# Patient Record
Sex: FEMALE | Race: WHITE | NOT HISPANIC OR LATINO | Employment: OTHER | URBAN - METROPOLITAN AREA
[De-identification: names, ages, dates, MRNs, and addresses within clinical notes are randomized per-mention and may not be internally consistent; named-entity substitution may affect disease eponyms.]

---

## 2019-12-09 ENCOUNTER — OFFICE VISIT (OUTPATIENT)
Dept: FAMILY MEDICINE | Facility: CLINIC | Age: 84
End: 2019-12-09
Payer: MEDICARE

## 2019-12-09 ENCOUNTER — HOSPITAL ENCOUNTER (OUTPATIENT)
Dept: RADIOLOGY | Facility: HOSPITAL | Age: 84
Discharge: HOME | End: 2019-12-09
Attending: INTERNAL MEDICINE
Payer: MEDICARE

## 2019-12-09 VITALS
DIASTOLIC BLOOD PRESSURE: 88 MMHG | BODY MASS INDEX: 30.64 KG/M2 | SYSTOLIC BLOOD PRESSURE: 138 MMHG | WEIGHT: 152 LBS | OXYGEN SATURATION: 98 % | HEART RATE: 71 BPM | TEMPERATURE: 98 F | HEIGHT: 59 IN | RESPIRATION RATE: 16 BRPM

## 2019-12-09 DIAGNOSIS — G57.93 NEUROPATHY INVOLVING BOTH LOWER EXTREMITIES: ICD-10-CM

## 2019-12-09 DIAGNOSIS — I11.0 BENIGN HYPERTENSIVE HEART DISEASE WITH HEART FAILURE: Chronic | ICD-10-CM

## 2019-12-09 DIAGNOSIS — M25.471 PAIN AND SWELLING OF RIGHT ANKLE: Primary | ICD-10-CM

## 2019-12-09 DIAGNOSIS — M85.89 OSTEOPENIA OF MULTIPLE SITES: Chronic | ICD-10-CM

## 2019-12-09 DIAGNOSIS — I36.1 NONRHEUMATIC TRICUSPID VALVE REGURGITATION: ICD-10-CM

## 2019-12-09 DIAGNOSIS — M81.0 AGE-RELATED OSTEOPOROSIS WITHOUT CURRENT PATHOLOGICAL FRACTURE: ICD-10-CM

## 2019-12-09 DIAGNOSIS — I11.0 BENIGN HYPERTENSIVE HEART DISEASE WITH HEART FAILURE: ICD-10-CM

## 2019-12-09 DIAGNOSIS — E78.2 MIXED HYPERLIPIDEMIA: Chronic | ICD-10-CM

## 2019-12-09 DIAGNOSIS — G47.33 OBSTRUCTIVE SLEEP APNEA SYNDROME: ICD-10-CM

## 2019-12-09 DIAGNOSIS — N18.30 STAGE 3 CHRONIC KIDNEY DISEASE: ICD-10-CM

## 2019-12-09 DIAGNOSIS — G89.29 HEEL PAIN, CHRONIC, RIGHT: ICD-10-CM

## 2019-12-09 DIAGNOSIS — N18.4 STAGE 4 CHRONIC KIDNEY DISEASE: ICD-10-CM

## 2019-12-09 DIAGNOSIS — K21.9 GASTROESOPHAGEAL REFLUX DISEASE WITHOUT ESOPHAGITIS: ICD-10-CM

## 2019-12-09 DIAGNOSIS — E66.09 CLASS 1 OBESITY DUE TO EXCESS CALORIES WITH SERIOUS COMORBIDITY AND BODY MASS INDEX (BMI) OF 30.0 TO 30.9 IN ADULT: ICD-10-CM

## 2019-12-09 DIAGNOSIS — M79.671 HEEL PAIN, CHRONIC, RIGHT: ICD-10-CM

## 2019-12-09 DIAGNOSIS — E78.2 MIXED HYPERLIPIDEMIA: ICD-10-CM

## 2019-12-09 DIAGNOSIS — D53.9 MACROCYTIC ANEMIA: ICD-10-CM

## 2019-12-09 DIAGNOSIS — I48.20 CHRONIC ATRIAL FIBRILLATION: Chronic | ICD-10-CM

## 2019-12-09 DIAGNOSIS — I50.32 CHRONIC DIASTOLIC HEART FAILURE: ICD-10-CM

## 2019-12-09 DIAGNOSIS — M25.471 PAIN AND SWELLING OF RIGHT ANKLE: ICD-10-CM

## 2019-12-09 DIAGNOSIS — I48.20 CHRONIC ATRIAL FIBRILLATION: ICD-10-CM

## 2019-12-09 DIAGNOSIS — Z79.899 ENCOUNTER FOR LONG-TERM CURRENT USE OF MEDICATION: ICD-10-CM

## 2019-12-09 DIAGNOSIS — Z79.01 LONG TERM CURRENT USE OF ANTICOAGULANT THERAPY: ICD-10-CM

## 2019-12-09 DIAGNOSIS — I36.1 NONRHEUMATIC TRICUSPID VALVE REGURGITATION: Chronic | ICD-10-CM

## 2019-12-09 DIAGNOSIS — R60.0 LOCALIZED EDEMA: ICD-10-CM

## 2019-12-09 DIAGNOSIS — R73.03 PREDIABETES: ICD-10-CM

## 2019-12-09 DIAGNOSIS — M25.571 PAIN AND SWELLING OF RIGHT ANKLE: ICD-10-CM

## 2019-12-09 DIAGNOSIS — K21.9 GASTROESOPHAGEAL REFLUX DISEASE WITHOUT ESOPHAGITIS: Chronic | ICD-10-CM

## 2019-12-09 DIAGNOSIS — M25.571 PAIN AND SWELLING OF RIGHT ANKLE: Primary | ICD-10-CM

## 2019-12-09 DIAGNOSIS — I50.32 CHRONIC DIASTOLIC HEART FAILURE: Chronic | ICD-10-CM

## 2019-12-09 DIAGNOSIS — Z79.899 LONG TERM CURRENT USE OF DIURETIC: ICD-10-CM

## 2019-12-09 PROBLEM — I11.9 BENIGN HYPERTENSIVE HEART DISEASE: Status: ACTIVE | Noted: 2019-12-09

## 2019-12-09 PROBLEM — I07.1 TRICUSPID VALVE REGURGITATION: Chronic | Status: ACTIVE | Noted: 2018-09-19

## 2019-12-09 PROBLEM — E66.811 CLASS 1 OBESITY DUE TO EXCESS CALORIES WITH SERIOUS COMORBIDITY AND BODY MASS INDEX (BMI) OF 30.0 TO 30.9 IN ADULT: Status: ACTIVE | Noted: 2019-12-09

## 2019-12-09 PROBLEM — E55.9 VITAMIN D INSUFFICIENCY: Status: ACTIVE | Noted: 2019-12-09

## 2019-12-09 PROBLEM — I07.1 TRICUSPID VALVE REGURGITATION: Status: ACTIVE | Noted: 2018-09-19

## 2019-12-09 PROBLEM — N18.31 CKD STAGE G3A/A1, GFR 45-59 AND ALBUMIN CREATININE RATIO <30 MG/G: Chronic | Status: ACTIVE | Noted: 2019-12-09

## 2019-12-09 PROBLEM — I13.0 BENIGN HYPERTENSIVE HEART AND KIDNEY DISEASE WITH CHF, NYHA CLASS 1 AND CKD STAGE 3: Status: ACTIVE | Noted: 2019-12-09

## 2019-12-09 PROBLEM — I13.0 BENIGN HYPERTENSIVE HEART AND KIDNEY DISEASE WITH CHF, NYHA CLASS 1 AND CKD STAGE 3: Chronic | Status: ACTIVE | Noted: 2019-12-09

## 2019-12-09 PROBLEM — J44.9 CHRONIC OBSTRUCTIVE LUNG DISEASE: Status: ACTIVE | Noted: 2018-09-19

## 2019-12-09 PROBLEM — I11.9 BENIGN HYPERTENSIVE HEART DISEASE: Chronic | Status: ACTIVE | Noted: 2019-12-09

## 2019-12-09 PROCEDURE — 73630 X-RAY EXAM OF FOOT: CPT | Mod: TC,PO,RT

## 2019-12-09 PROCEDURE — 99214 OFFICE O/P EST MOD 30 MIN: CPT | Mod: S$GLB,,, | Performed by: INTERNAL MEDICINE

## 2019-12-09 PROCEDURE — 73630 X-RAY EXAM OF FOOT: CPT | Mod: 26,RT,, | Performed by: RADIOLOGY

## 2019-12-09 PROCEDURE — 73610 XR ANKLE COMPLETE 3 VIEW RIGHT: ICD-10-PCS | Mod: 26,RT,, | Performed by: RADIOLOGY

## 2019-12-09 PROCEDURE — 73610 X-RAY EXAM OF ANKLE: CPT | Mod: TC,PO,RT

## 2019-12-09 PROCEDURE — 73630 XR FOOT COMPLETE 3 VIEW RIGHT: ICD-10-PCS | Mod: 26,RT,, | Performed by: RADIOLOGY

## 2019-12-09 PROCEDURE — 99999 PR PBB SHADOW E&M-EST. PATIENT-LVL V: ICD-10-PCS | Mod: PBBFAC,,, | Performed by: INTERNAL MEDICINE

## 2019-12-09 PROCEDURE — 99999 PR PBB SHADOW E&M-EST. PATIENT-LVL V: CPT | Mod: PBBFAC,,, | Performed by: INTERNAL MEDICINE

## 2019-12-09 PROCEDURE — 73610 X-RAY EXAM OF ANKLE: CPT | Mod: 26,RT,, | Performed by: RADIOLOGY

## 2019-12-09 PROCEDURE — 99214 PR OFFICE/OUTPT VISIT, EST, LEVL IV, 30-39 MIN: ICD-10-PCS | Mod: S$GLB,,, | Performed by: INTERNAL MEDICINE

## 2019-12-09 RX ORDER — PANTOPRAZOLE SODIUM 40 MG/1
40 TABLET, DELAYED RELEASE ORAL DAILY
Refills: 3 | COMMUNITY
Start: 2019-10-08 | End: 2020-03-24

## 2019-12-09 RX ORDER — NYSTATIN 100000 [USP'U]/G
1 POWDER TOPICAL 4 TIMES DAILY
COMMUNITY
Start: 2019-07-08 | End: 2021-05-11 | Stop reason: SDUPTHER

## 2019-12-09 RX ORDER — FERROUS SULFATE 325(65) MG
325 TABLET ORAL DAILY
COMMUNITY
End: 2021-05-16 | Stop reason: SDUPTHER

## 2019-12-09 RX ORDER — FUROSEMIDE 20 MG/1
20 TABLET ORAL DAILY PRN
COMMUNITY
End: 2019-12-09 | Stop reason: SDUPTHER

## 2019-12-09 RX ORDER — CARVEDILOL 12.5 MG/1
12.5 TABLET ORAL DAILY
Qty: 90 TABLET | Refills: 1 | Status: SHIPPED | OUTPATIENT
Start: 2019-12-09 | End: 2020-05-08 | Stop reason: SDUPTHER

## 2019-12-09 RX ORDER — NAPROXEN SODIUM 220 MG/1
81 TABLET, FILM COATED ORAL DAILY
COMMUNITY
End: 2020-07-28

## 2019-12-09 RX ORDER — FUROSEMIDE 20 MG/1
20 TABLET ORAL DAILY PRN
Qty: 90 TABLET | Refills: 1 | Status: SHIPPED | OUTPATIENT
Start: 2019-12-09 | End: 2020-06-18

## 2019-12-09 RX ORDER — CYCLOBENZAPRINE HCL 10 MG
10 TABLET ORAL
Refills: 1 | COMMUNITY
Start: 2019-12-02 | End: 2022-04-12 | Stop reason: SINTOL

## 2019-12-09 RX ORDER — ALENDRONATE SODIUM 70 MG/1
70 TABLET ORAL WEEKLY
Qty: 12 TABLET | Refills: 1 | Status: SHIPPED | OUTPATIENT
Start: 2019-12-09 | End: 2020-06-10

## 2019-12-09 RX ORDER — FLUTICASONE PROPIONATE 110 UG/1
1 AEROSOL, METERED RESPIRATORY (INHALATION) 2 TIMES DAILY
COMMUNITY
Start: 2019-01-02 | End: 2019-12-09

## 2019-12-09 RX ORDER — CARVEDILOL 12.5 MG/1
12.5 TABLET ORAL DAILY
COMMUNITY
Start: 2018-08-13 | End: 2019-12-09 | Stop reason: SDUPTHER

## 2019-12-09 RX ORDER — HYDROCODONE BITARTRATE AND ACETAMINOPHEN 7.5; 325 MG/1; MG/1
1 TABLET ORAL EVERY 8 HOURS PRN
Refills: 0 | COMMUNITY
Start: 2019-12-02 | End: 2020-06-18

## 2019-12-09 RX ORDER — FERROUS SULFATE, DRIED 160(50) MG
1 TABLET, EXTENDED RELEASE ORAL 2 TIMES DAILY
COMMUNITY
End: 2020-06-22

## 2019-12-09 RX ORDER — GABAPENTIN 100 MG/1
100 CAPSULE ORAL 2 TIMES DAILY
Refills: 1 | COMMUNITY
Start: 2019-12-02

## 2019-12-09 RX ORDER — ALENDRONATE SODIUM 70 MG/1
70 TABLET ORAL WEEKLY
COMMUNITY
Start: 2019-10-08 | End: 2019-12-09 | Stop reason: SDUPTHER

## 2019-12-09 RX ORDER — PRAVASTATIN SODIUM 40 MG/1
40 TABLET ORAL NIGHTLY
Refills: 3 | COMMUNITY
Start: 2019-10-08 | End: 2020-05-08 | Stop reason: SDUPTHER

## 2019-12-09 RX ORDER — DABIGATRAN ETEXILATE 150 MG/1
150 CAPSULE ORAL 2 TIMES DAILY
COMMUNITY
End: 2020-01-30 | Stop reason: SDUPTHER

## 2019-12-09 NOTE — PROGRESS NOTES
Subjective:      Patient ID: Miriam Oro is a 86 y.o. female.    Chief Complaint: Foot Swelling (right foot, and heel pain )    HPI     Ms. Marcos is a 87F here for follow up of chronic health conditions. Last seen by me at [facility] on 7/8/19.     Following with Dr. Atwood for HFpEF. Edema stable. Lasix is daily PRN. Has some right pedal edema, which is longstanding. No h/o trauma or fall or fracture. She has not been compliant with elevation or compression stockings. No pedal edema on left side. No SOB, orthopnea, or PND. No change in swelling with lasix use. No new medications. Discussed US and xray.     Following with Dr. Grant for CKD 3. Labs done in 9/2019.      Has been less active. Occasionally walks around the house and outside. she has gained a few pounds with dietary indiscretion. Discussed healthy diet.     Doing well with fosamax. Still taking weekly. No recent falls.     All medications unchanged with refills.     [facility], but not in links. We will contact them.     Reports seeing pain management, but last fill per  from 7/2018.    Depression Patient Health Questionnaire 12/9/2019   Over the last two weeks how often have you been bothered by little interest or pleasure in doing things 0   Over the last two weeks how often have you been bothered by feeling down, depressed or hopeless 0   PHQ-2 Total Score 0       Review of patient's allergies indicates:   Allergen Reactions    Meloxicam Other (See Comments)     Decreases renal function    Nitrofurantoin monohyd/m-cryst Itching       Current Outpatient Medications:     alendronate (FOSAMAX) 70 MG tablet, Take 1 tablet (70 mg total) by mouth once a week., Disp: 12 tablet, Rfl: 1    aspirin 81 MG Chew, Take 81 mg by mouth once daily., Disp: , Rfl:     calcium-vitamin D3 (OYSTER SHELL CALCIUM-VIT D3) 500 mg(1,250mg) -200 unit per tablet, Take 1 tablet by mouth 2 (two) times daily., Disp: , Rfl:     carvedilol (COREG)  12.5 MG tablet, Take 1 tablet (12.5 mg total) by mouth once daily., Disp: 90 tablet, Rfl: 1    cyclobenzaprine (FLEXERIL) 10 MG tablet, Take 10 mg by mouth nightly. at bedtime., Disp: , Rfl: 1    dabigatran etexilate (PRADAXA) 150 mg Cap, Take 150 mg by mouth 2 (two) times daily., Disp: , Rfl:     ferrous sulfate (FEOSOL) 325 mg (65 mg iron) Tab tablet, Take 325 mg by mouth once daily., Disp: , Rfl:     furosemide (LASIX) 20 MG tablet, Take 1 tablet (20 mg total) by mouth daily as needed., Disp: 90 tablet, Rfl: 1    gabapentin (NEURONTIN) 100 MG capsule, Take 200 mg by mouth 3 (three) times daily. , Disp: , Rfl: 1    HYDROcodone-acetaminophen (NORCO) 7.5-325 mg per tablet, Take 1 tablet by mouth every 8 (eight) hours as needed., Disp: , Rfl: 0    nystatin (MYCOSTATIN) powder, Apply 1 application topically 4 (four) times daily., Disp: , Rfl:     pantoprazole (PROTONIX) 40 MG tablet, Take 40 mg by mouth once daily., Disp: , Rfl: 3    pravastatin (PRAVACHOL) 40 MG tablet, Take 40 mg by mouth nightly. at bedtime., Disp: , Rfl: 3    Past Medical History:   Diagnosis Date    Age-related osteoporosis without current pathological fracture 8/13/2018    Chronic atrial fibrillation 8/13/2018    Chronic diastolic heart failure 8/13/2018    Mixed hyperlipidemia 8/13/2018    Osteopenia of multiple sites 12/9/2019    RA (rheumatoid arthritis)     Tricuspid valve regurgitation 9/19/2018     Past Surgical History:   Procedure Laterality Date    TOTAL ABDOMINAL HYSTERECTOMY W/ BILATERAL SALPINGOOPHORECTOMY       Family History   Problem Relation Age of Onset    Aneurysm Mother      Social History     Socioeconomic History    Marital status:      Spouse name: Not on file    Number of children: Not on file    Years of education: Not on file    Highest education level: Not on file   Occupational History    Not on file   Social Needs    Financial resource strain: Not on file    Food insecurity:     Worry:  Not on file     Inability: Not on file    Transportation needs:     Medical: Not on file     Non-medical: Not on file   Tobacco Use    Smoking status: Never Smoker    Smokeless tobacco: Never Used   Substance and Sexual Activity    Alcohol use: Not Currently    Drug use: Never    Sexual activity: Not Currently     Partners: Male     Birth control/protection: Surgical   Lifestyle    Physical activity:     Days per week: Not on file     Minutes per session: Not on file    Stress: Not on file   Relationships    Social connections:     Talks on phone: Not on file     Gets together: Not on file     Attends Restorationism service: Not on file     Active member of club or organization: Not on file     Attends meetings of clubs or organizations: Not on file     Relationship status: Not on file   Other Topics Concern    Not on file   Social History Narrative    Not on file      Review of Systems   Constitutional: Positive for activity change and fatigue. Negative for chills and fever.   HENT: Positive for ear pain, postnasal drip, rhinorrhea, sinus pressure, sinus pain, sneezing and sore throat. Negative for congestion.    Eyes: Positive for itching. Negative for visual disturbance.   Respiratory: Negative for cough, shortness of breath and wheezing.    Cardiovascular: Negative.  Negative for chest pain and palpitations.   Gastrointestinal: Negative for abdominal pain and nausea.   Endocrine: Negative for cold intolerance and heat intolerance.   Genitourinary: Negative.  Negative for dysuria.   Musculoskeletal: Negative for back pain and myalgias.   Skin: Negative.  Negative for rash.   Allergic/Immunologic: Negative for environmental allergies.   Neurological: Negative for dizziness, syncope and headaches.   Hematological: Positive for adenopathy. Does not bruise/bleed easily.   Psychiatric/Behavioral: Negative for dysphoric mood and sleep disturbance.         Objective:     Body mass index is 30.7 kg/m².  /88  "(BP Location: Left arm, Patient Position: Sitting, BP Method: Medium (Automatic))   Pulse 71   Temp 98.1 °F (36.7 °C)   Resp 16   Ht 4' 11" (1.499 m)   Wt 68.9 kg (152 lb)   SpO2 98%   BMI 30.70 kg/m²       Physical Exam   Constitutional: She is oriented to person, place, and time. She appears well-developed and well-nourished. No distress.   Here with granddaughter     HENT:   Head: Normocephalic.   Mouth/Throat: Oropharynx is clear and moist.   Eyes: Conjunctivae are normal.   Neck: Normal range of motion. Neck supple.   Cardiovascular: Normal rate, normal heart sounds and intact distal pulses.   Pulses:       Dorsalis pedis pulses are 2+ on the right side, and 2+ on the left side.        Posterior tibial pulses are 2+ on the right side, and 2+ on the left side.   Irregularly irregular   Pulmonary/Chest: Effort normal and breath sounds normal. She has no wheezes.   Abdominal: Soft. Bowel sounds are normal.   Musculoskeletal: She exhibits edema (right pedal edema).        Right foot: There is tenderness. There is normal range of motion, no bony tenderness, no swelling, normal capillary refill and no deformity.        Left foot: There is normal range of motion and no deformity.        Feet:    Feet:   Right Foot:   Protective Sensation: 10 sites tested. 10 sites sensed.   Skin Integrity: Negative for ulcer, blister, skin breakdown, erythema, warmth, callus or dry skin.   Left Foot:   Protective Sensation: 10 sites tested. 10 sites sensed.   Skin Integrity: Negative for ulcer, blister, skin breakdown, erythema, warmth, callus or dry skin.   Lymphadenopathy:     She has no cervical adenopathy.   Neurological: She is alert and oriented to person, place, and time. No sensory deficit.   Skin: Skin is warm and dry. Capillary refill takes 2 to 3 seconds. She is not diaphoretic.   Psychiatric: She has a normal mood and affect. Her behavior is normal.   Nursing note and vitals reviewed.      Lab Visit on 12/09/2019 "   Component Date Value Ref Range Status    Hemoglobin A1C 12/09/2019 5.7* 4.0 - 5.6 % Final    Comment: ADA Screening Guidelines:  5.7-6.4%  Consistent with prediabetes  >or=6.5%  Consistent with diabetes  High levels of fetal hemoglobin interfere with the HbA1C  assay. Heterozygous hemoglobin variants (HbS, HgC, etc)do  not significantly interfere with this assay.   However, presence of multiple variants may affect accuracy.      Estimated Avg Glucose 12/09/2019 117  68 - 131 mg/dL Final     No results found in the last 24 hours.   Assessment:     Encounter Diagnoses   Name Primary?    Pain and swelling of right ankle Yes    Neuropathy involving both lower extremities     Chronic atrial fibrillation     Benign hypertensive heart disease with heart failure     Chronic diastolic heart failure     Mixed hyperlipidemia     Nonrheumatic tricuspid valve regurgitation     Stage 3 chronic kidney disease     Gastroesophageal reflux disease without esophagitis     Prediabetes     Encounter for long-term current use of medication     Macrocytic anemia     Localized edema      Osteopenia of multiple sites     Class 1 obesity due to excess calories with serious comorbidity and body mass index (BMI) of 30.0 to 30.9 in adult     Heel pain, chronic, right         Plan:     #Right pedal edema with heel pain  -longstanding. No h/o fracture. Pain at achilles tendon insertion site.   -compression stockings with elevation  -xray and us    #Chronic Afib on NOAC, controlled  -Continue Coreg 12.5 mg daily & Pradaxa BID + ASA 81 mg daily   -Rate controlled  -Follows with Dr. Atwood  -8/31/18: TSH 2.68. Repeat      #Osteopenia  -Previously osteoporosis in 1/2013.    -Dexa 8/29/18- now osteopenia, however, considering h/o compression fracture opted to continue bisphosphonate therapy with fosamax  -Repeat dexa in 2021  -Continue Calcium and Vitamin D  -10/30/18: vit d 38.8 (insufficient in 2017-  25.7)     #HFpEF  Euvolemic.  -Follows with Dr. Atwood   -Lasix 20 mg BID PRN     #CKD stage 3  -9/26/19: Cr 1.08, GFR 48, Urine protein/cr ratio 0.09, Uric acid 5.9 iPTH 66, phos 3.6, mg 2.1   -Follows with Dr. Grant     #GERD  -Continue Protonix daily (9/26/19: Mg 2.1)     #HLD  -Pravastatin 40 mg daily  -Lipid panel normal 8/2018  -repeat     #Prediabetes  -10/30/18: ha1c 5.8% --> 9/26/19: ha1c 6.3%  -repeat    #Obesity- BMI 30.7  -She has gained about 5 pounds since last visit. Encouraged to eat healthy with more weight bearing exercises    #Macrocytic anemia with neuropathy of BLE  9/26/19: 12.4/38. .3  -b12, folate, MMA  -Continue Iron daily (previous GI bleed)     #Chronic Pain  -Sees pain management.  reviewed with last fill 7/2018  -Continue Norco and Gabapentin      #Preventative Care  -Influenza UTD - given at [facility]. Not showing in links.  -TDAP 1/1/2005  -Prevnar 4/8/19. Pneumovax 10/30/2003  -Shingrix rx given   -Mammogram 2014. Birads 1. No indication to repeat considering advanced age.   -No indication for colonoscopy considering advanced age.      Randi Oro M.D.    Orders Placed This Encounter   Procedures    US Lower Extremity Veins Right    X-Ray Ankle Complete 3 View Right    X-Ray Foot Complete 3 view Right    CBC auto differential    Comprehensive metabolic panel    Lipid panel    TSH    Hemoglobin A1c    Vitamin D    Magnesium    Vitamin B12    Folate      Medications Ordered This Encounter   Medications    alendronate (FOSAMAX) 70 MG tablet     Sig: Take 1 tablet (70 mg total) by mouth once a week.     Dispense:  12 tablet     Refill:  1    carvedilol (COREG) 12.5 MG tablet     Sig: Take 1 tablet (12.5 mg total) by mouth once daily.     Dispense:  90 tablet     Refill:  1    furosemide (LASIX) 20 MG tablet     Sig: Take 1 tablet (20 mg total) by mouth daily as needed.     Dispense:  90 tablet     Refill:  1

## 2019-12-10 ENCOUNTER — TELEPHONE (OUTPATIENT)
Dept: FAMILY MEDICINE | Facility: CLINIC | Age: 84
End: 2019-12-10

## 2019-12-10 RX ORDER — CALCIUM CARB/VITAMIN D3/VIT K1 500-500-40
400 TABLET,CHEWABLE ORAL DAILY
COMMUNITY
End: 2020-06-22

## 2019-12-10 RX ORDER — LANOLIN ALCOHOL/MO/W.PET/CERES
100 CREAM (GRAM) TOPICAL DAILY
COMMUNITY
End: 2020-06-18

## 2019-12-10 NOTE — TELEPHONE ENCOUNTER
----- Message from Randi Oro MD sent at 12/10/2019  7:05 AM CST -----  Prediabetes has improved (down from 6.3%). Vitamin d insufficiency. Would recommend starting over the counter vitamin d 400 units daily (if in agreement please add to med list). b12 still borderline low. Would start over the counter b12 1,000 mcg daily  (if in agreement please add to med list)    PLEASE CALL HER OR HER GRANDDAUGHTER CATRACHITA IF SHE DOES NOT RESPOND TO THE Spokeable MESSAGE

## 2019-12-12 ENCOUNTER — TELEPHONE (OUTPATIENT)
Dept: RADIOLOGY | Facility: HOSPITAL | Age: 84
End: 2019-12-12

## 2019-12-13 ENCOUNTER — HOSPITAL ENCOUNTER (OUTPATIENT)
Dept: RADIOLOGY | Facility: HOSPITAL | Age: 84
Discharge: HOME | End: 2019-12-13
Attending: INTERNAL MEDICINE
Payer: MEDICARE

## 2019-12-13 DIAGNOSIS — R73.03 PREDIABETES: ICD-10-CM

## 2019-12-13 DIAGNOSIS — M25.471 PAIN AND SWELLING OF RIGHT ANKLE: ICD-10-CM

## 2019-12-13 DIAGNOSIS — M25.571 PAIN AND SWELLING OF RIGHT ANKLE: ICD-10-CM

## 2019-12-13 DIAGNOSIS — I11.0 BENIGN HYPERTENSIVE HEART DISEASE WITH HEART FAILURE: ICD-10-CM

## 2019-12-13 DIAGNOSIS — E78.2 MIXED HYPERLIPIDEMIA: ICD-10-CM

## 2019-12-13 DIAGNOSIS — N18.30 STAGE 3 CHRONIC KIDNEY DISEASE: ICD-10-CM

## 2019-12-13 DIAGNOSIS — I48.20 CHRONIC ATRIAL FIBRILLATION: ICD-10-CM

## 2019-12-13 DIAGNOSIS — R60.0 LOCALIZED EDEMA: ICD-10-CM

## 2019-12-13 DIAGNOSIS — I50.32 CHRONIC DIASTOLIC HEART FAILURE: ICD-10-CM

## 2019-12-13 DIAGNOSIS — G57.93 NEUROPATHY INVOLVING BOTH LOWER EXTREMITIES: ICD-10-CM

## 2019-12-13 DIAGNOSIS — D53.9 MACROCYTIC ANEMIA: ICD-10-CM

## 2019-12-13 DIAGNOSIS — Z79.899 ENCOUNTER FOR LONG-TERM CURRENT USE OF MEDICATION: ICD-10-CM

## 2019-12-13 DIAGNOSIS — K21.9 GASTROESOPHAGEAL REFLUX DISEASE WITHOUT ESOPHAGITIS: ICD-10-CM

## 2019-12-13 DIAGNOSIS — I36.1 NONRHEUMATIC TRICUSPID VALVE REGURGITATION: ICD-10-CM

## 2019-12-13 PROCEDURE — 93971 US LOWER EXTREMITY VEINS RIGHT: ICD-10-PCS | Mod: 26,RT,, | Performed by: RADIOLOGY

## 2019-12-13 PROCEDURE — 93971 EXTREMITY STUDY: CPT | Mod: 26,RT,, | Performed by: RADIOLOGY

## 2019-12-13 PROCEDURE — 93971 EXTREMITY STUDY: CPT | Mod: TC,PO,RT

## 2020-01-10 ENCOUNTER — TELEPHONE (OUTPATIENT)
Dept: FAMILY MEDICINE | Facility: CLINIC | Age: 85
End: 2020-01-10

## 2020-01-10 NOTE — TELEPHONE ENCOUNTER
----- Message from Warren Still sent at 1/10/2020  8:27 AM CST -----  ..Type:  Needs Medical Advice    Who Called: Amaya ( Grandmother )   Symptoms (please be specific):  Congestion  ( cold Like )   How long has patient had these symptoms:   Few days   Pharmacy name and phone #:  ..    [facility] - [address]  [address] [facility]  [place] [address]  Phone: [phone] Fax: [phone]  Would the patient rather a call back or a response via [facility]? Call back  Best Call Back Number: [phone]  Additional Information:

## 2020-01-11 NOTE — TELEPHONE ENCOUNTER
I recommended the use of Allegra because she complained of a drippy nose and no congestion or fever.

## 2020-01-13 DIAGNOSIS — K21.9 GASTROESOPHAGEAL REFLUX DISEASE WITHOUT ESOPHAGITIS: Chronic | ICD-10-CM

## 2020-01-13 DIAGNOSIS — I50.32 CHRONIC DIASTOLIC HEART FAILURE: Chronic | ICD-10-CM

## 2020-01-13 DIAGNOSIS — G57.93 NEUROPATHY INVOLVING BOTH LOWER EXTREMITIES: ICD-10-CM

## 2020-01-13 DIAGNOSIS — N18.30 STAGE 3 CHRONIC KIDNEY DISEASE: ICD-10-CM

## 2020-01-13 DIAGNOSIS — Z79.899 ENCOUNTER FOR LONG-TERM CURRENT USE OF MEDICATION: ICD-10-CM

## 2020-01-13 DIAGNOSIS — R73.03 PREDIABETES: ICD-10-CM

## 2020-01-13 DIAGNOSIS — E78.2 MIXED HYPERLIPIDEMIA: Chronic | ICD-10-CM

## 2020-01-13 DIAGNOSIS — I11.0 BENIGN HYPERTENSIVE HEART DISEASE WITH HEART FAILURE: Chronic | ICD-10-CM

## 2020-01-13 DIAGNOSIS — I36.1 NONRHEUMATIC TRICUSPID VALVE REGURGITATION: Chronic | ICD-10-CM

## 2020-01-13 DIAGNOSIS — D53.9 MACROCYTIC ANEMIA: ICD-10-CM

## 2020-01-13 DIAGNOSIS — I48.20 CHRONIC ATRIAL FIBRILLATION: Chronic | ICD-10-CM

## 2020-01-13 DIAGNOSIS — M25.571 PAIN AND SWELLING OF RIGHT ANKLE: ICD-10-CM

## 2020-01-13 DIAGNOSIS — M25.471 PAIN AND SWELLING OF RIGHT ANKLE: ICD-10-CM

## 2020-01-13 RX ORDER — CARVEDILOL 12.5 MG/1
12.5 TABLET ORAL DAILY
Qty: 90 TABLET | Refills: 1 | Status: CANCELLED | OUTPATIENT
Start: 2020-01-13

## 2020-01-13 NOTE — TELEPHONE ENCOUNTER
Outpatient Medication Detail      Disp Refills Start End HEATHER   carvedilol (COREG) 12.5 MG tablet 90 tablet 1 12/9/2019  No   Sig - Route: Take 1 tablet (12.5 mg total) by mouth once daily. - Oral   Sent to pharmacy as: carvedilol (COREG) 12.5 MG tablet   Class: Normal   Order: [id number]   Date/Time Signed: 12/9/2019 10:01       E-Prescribing Status: Receipt confirmed by pharmacy (12/9/2019 10:08 AM CST)   Pharmacy     [facility] [place] - [address]

## 2020-01-13 NOTE — TELEPHONE ENCOUNTER
----- Message from Saumya Colon sent at 1/13/2020  8:25 AM CST -----  Contact: Mckinleygrand daughter  Type:  RX Refill Request    Who Called: Amaya-grand daughter  Refill or New Rx:refill  RX Name and Strength: carvedilol (COREG) 12.5 MG tablet   How is the patient currently taking it? (ex. 1XDay):1xday  Is this a 30 day or 90 day RX:90  Preferred Pharmacy with phone number:    [facility] - [address]  [address] [facility] [address]  [place] [address]  Phone: [phone] Fax: [phone]    Local or Mail Order:local  Ordering Provider:Preethi  Would the patient rather a call back or a response via [facility]? call  Best Call Back Number:[phone]  Additional Information:

## 2020-01-30 RX ORDER — DABIGATRAN ETEXILATE 150 MG/1
150 CAPSULE ORAL 2 TIMES DAILY
Qty: 60 CAPSULE | Refills: 3 | Status: SHIPPED | OUTPATIENT
Start: 2020-01-30 | End: 2020-06-22

## 2020-01-30 NOTE — TELEPHONE ENCOUNTER
----- Message from Apryl Stern sent at 1/30/2020  9:13 AM CST -----  Contact: granddaughter  Type:  RX Refill Request    Who Called: Amaya  Refill or New Rx:refill  RX Name and Strength:praxdaxa 150 mg  How is the patient currently taking it? (ex. 1XDay):2Xday  Is this a 30 day or 90 day RX:30  Preferred Pharmacy with phone number:.  [facility] - [address] [facility] [address]  [address] [facility]  [place] [address]  Phone: [phone] Fax: [phone]  Local or Mail Order:local  Ordering Provider:Dr Oro  Would the patient rather a call back or a response via [facility]? Call back  Best Call Back Number:[phone]  Additional Information: #    Thank you

## 2020-02-07 RX ORDER — DABIGATRAN ETEXILATE 150 MG/1
150 CAPSULE ORAL 2 TIMES DAILY
Qty: 180 CAPSULE | Refills: 1 | OUTPATIENT
Start: 2020-02-07

## 2020-02-07 NOTE — TELEPHONE ENCOUNTER
dabigatran etexilate (PRADAXA) 150 mg Cap 60 capsule 3 1/30/2020  No   Sig - Route: Take 1 capsule (150 mg total) by mouth 2 (two) times daily. - Oral   Sent to pharmacy as: dabigatran etexilate (PRADAXA) 150 mg Cap   Class: Normal   Order: [id number]   Date/Time Signed: 1/30/2020 10:06       E-Prescribing Status: Receipt confirmed by pharmacy (1/30/2020 10:08 AM CST)     Check why i'm receiving multiple requests for escribed medications

## 2020-02-07 NOTE — TELEPHONE ENCOUNTER
----- Message from Deena Steven sent at 2/7/2020  1:14 PM CST -----  Contact: [phone]  Would like to consult with the nurse, Patient needs an Rx Medication, please call  Back at [phone], Thank sj  .Type:  RX Refill Request    Who Called:  Ms Oro  Refill or New Rx:Refill  RX Name and Strength:Blood Thinner pradaxa 150mg Also Carvedilol 12.5 mg   How is the patient currently taking it? (ex. 1XDay): Twice a day  Is this a 30 day or 90 day RX:90  Preferred Pharmacy with phone number:.  [facility] - [address]  [address] [facility] [address]  [place] [address]  Phone: [phone] Fax: [phone]      Local or Mail Order: Local  Ordering Provider:Dr Oro  Would the patient rather a call back or a response via [facility]? callback  Best Call Back Number:[phone]  Additional Information:

## 2020-03-24 RX ORDER — PANTOPRAZOLE SODIUM 40 MG/1
TABLET, DELAYED RELEASE ORAL
Qty: 90 TABLET | Refills: 0 | Status: SHIPPED | OUTPATIENT
Start: 2020-03-24 | End: 2020-08-04

## 2020-04-02 ENCOUNTER — TELEPHONE (OUTPATIENT)
Dept: FAMILY MEDICINE | Facility: CLINIC | Age: 85
End: 2020-04-02

## 2020-04-02 DIAGNOSIS — I11.0 BENIGN HYPERTENSIVE HEART DISEASE WITH HEART FAILURE: Chronic | ICD-10-CM

## 2020-04-02 DIAGNOSIS — I50.32 CHRONIC DIASTOLIC HEART FAILURE: Chronic | ICD-10-CM

## 2020-04-02 DIAGNOSIS — G57.93 NEUROPATHY INVOLVING BOTH LOWER EXTREMITIES: ICD-10-CM

## 2020-04-02 DIAGNOSIS — I36.1 NONRHEUMATIC TRICUSPID VALVE REGURGITATION: Chronic | ICD-10-CM

## 2020-04-02 DIAGNOSIS — I48.20 CHRONIC ATRIAL FIBRILLATION: Chronic | ICD-10-CM

## 2020-04-02 DIAGNOSIS — R73.03 PREDIABETES: ICD-10-CM

## 2020-04-02 DIAGNOSIS — M25.471 PAIN AND SWELLING OF RIGHT ANKLE: ICD-10-CM

## 2020-04-02 DIAGNOSIS — Z79.899 ENCOUNTER FOR LONG-TERM CURRENT USE OF MEDICATION: ICD-10-CM

## 2020-04-02 DIAGNOSIS — N18.30 STAGE 3 CHRONIC KIDNEY DISEASE: ICD-10-CM

## 2020-04-02 DIAGNOSIS — E78.2 MIXED HYPERLIPIDEMIA: Chronic | ICD-10-CM

## 2020-04-02 DIAGNOSIS — K21.9 GASTROESOPHAGEAL REFLUX DISEASE WITHOUT ESOPHAGITIS: Chronic | ICD-10-CM

## 2020-04-02 DIAGNOSIS — D53.9 MACROCYTIC ANEMIA: ICD-10-CM

## 2020-04-02 DIAGNOSIS — M25.571 PAIN AND SWELLING OF RIGHT ANKLE: ICD-10-CM

## 2020-04-02 NOTE — TELEPHONE ENCOUNTER
granddaughter states patient is needing a PA for medication,PA Sent to plan.    ------------------------------------------------------------------------    Miriam Oro  [place]: [id number]   PA Case ID: [id number]     Status    Sent to Plan today      Drug  Alendronate Sodium 70MG tablets  Form  [employer] Electronic PA Form

## 2020-04-02 NOTE — TELEPHONE ENCOUNTER
----- Message from Nikki Rodriguez sent at 4/2/2020  9:13 AM CDT -----  Contact: Amaya/saeid Conde needs a call back at [phone], Regards to getting an authorization for FOSAMAX 70 mg.    [facility] [place] [facility] - [address] [facility] [address]  [address] [facility]  [place] [address]  Phone: [phone] Fax: [phone]    Thanks  Td

## 2020-05-08 DIAGNOSIS — D53.9 MACROCYTIC ANEMIA: ICD-10-CM

## 2020-05-08 DIAGNOSIS — K21.9 GASTROESOPHAGEAL REFLUX DISEASE WITHOUT ESOPHAGITIS: Chronic | ICD-10-CM

## 2020-05-08 DIAGNOSIS — I48.20 CHRONIC ATRIAL FIBRILLATION: Chronic | ICD-10-CM

## 2020-05-08 DIAGNOSIS — M25.571 PAIN AND SWELLING OF RIGHT ANKLE: ICD-10-CM

## 2020-05-08 DIAGNOSIS — I50.32 CHRONIC DIASTOLIC HEART FAILURE: Chronic | ICD-10-CM

## 2020-05-08 DIAGNOSIS — I11.0 BENIGN HYPERTENSIVE HEART DISEASE WITH HEART FAILURE: Chronic | ICD-10-CM

## 2020-05-08 DIAGNOSIS — M25.471 PAIN AND SWELLING OF RIGHT ANKLE: ICD-10-CM

## 2020-05-08 DIAGNOSIS — I36.1 NONRHEUMATIC TRICUSPID VALVE REGURGITATION: Chronic | ICD-10-CM

## 2020-05-08 DIAGNOSIS — E78.2 MIXED HYPERLIPIDEMIA: Chronic | ICD-10-CM

## 2020-05-08 DIAGNOSIS — N18.30 STAGE 3 CHRONIC KIDNEY DISEASE: ICD-10-CM

## 2020-05-08 DIAGNOSIS — Z79.899 ENCOUNTER FOR LONG-TERM CURRENT USE OF MEDICATION: ICD-10-CM

## 2020-05-08 DIAGNOSIS — G57.93 NEUROPATHY INVOLVING BOTH LOWER EXTREMITIES: ICD-10-CM

## 2020-05-08 DIAGNOSIS — R73.03 PREDIABETES: ICD-10-CM

## 2020-05-08 RX ORDER — PRAVASTATIN SODIUM 40 MG/1
40 TABLET ORAL NIGHTLY
Qty: 90 TABLET | Refills: 0 | Status: SHIPPED | OUTPATIENT
Start: 2020-05-08 | End: 2020-08-05

## 2020-05-08 RX ORDER — CARVEDILOL 12.5 MG/1
12.5 TABLET ORAL DAILY
Qty: 90 TABLET | Refills: 0 | Status: SHIPPED | OUTPATIENT
Start: 2020-05-08 | End: 2020-06-18 | Stop reason: SDUPTHER

## 2020-05-08 NOTE — TELEPHONE ENCOUNTER
----- Message from Rigo Antoine sent at 5/8/2020 10:01 AM CDT -----  Contact: Alin  .Type:  RX Refill Request    Who Called:  Alin   Refill or New Rx: refill  RX Name and Strength carvedilol (COREG) 12.5 MG tablet  pravastatin (PRAVACHOL) 40 MG tablet  How is the patient currently taking it? (ex. 1XDay): 1 x day   Is this a 30 day or 90 day RX: 90 days   Preferred Pharmacy with phone number:[facility]   Local or Mail Order: local   Ordering Provider: rosalina   Would the patient rather a call back or a response via [facility]? No   Best Call Back Number: .[phone]    Additional Information:               carvedilol (COREG) 12.5 MG tablet  pravastatin (PRAVACHOL) 40 MG tablet             [facility] - [address] [facility]  [address] [facility]  [place] [address]  Phone: [phone] Fax: [phone]

## 2020-06-10 DIAGNOSIS — Z79.899 ENCOUNTER FOR LONG-TERM CURRENT USE OF MEDICATION: ICD-10-CM

## 2020-06-10 DIAGNOSIS — I11.0 BENIGN HYPERTENSIVE HEART DISEASE WITH HEART FAILURE: Chronic | ICD-10-CM

## 2020-06-10 DIAGNOSIS — M25.471 PAIN AND SWELLING OF RIGHT ANKLE: ICD-10-CM

## 2020-06-10 DIAGNOSIS — I36.1 NONRHEUMATIC TRICUSPID VALVE REGURGITATION: Chronic | ICD-10-CM

## 2020-06-10 DIAGNOSIS — R73.03 PREDIABETES: ICD-10-CM

## 2020-06-10 DIAGNOSIS — D53.9 MACROCYTIC ANEMIA: ICD-10-CM

## 2020-06-10 DIAGNOSIS — I48.20 CHRONIC ATRIAL FIBRILLATION: Chronic | ICD-10-CM

## 2020-06-10 DIAGNOSIS — M25.571 PAIN AND SWELLING OF RIGHT ANKLE: ICD-10-CM

## 2020-06-10 DIAGNOSIS — N18.30 STAGE 3 CHRONIC KIDNEY DISEASE: ICD-10-CM

## 2020-06-10 DIAGNOSIS — E78.2 MIXED HYPERLIPIDEMIA: Chronic | ICD-10-CM

## 2020-06-10 DIAGNOSIS — K21.9 GASTROESOPHAGEAL REFLUX DISEASE WITHOUT ESOPHAGITIS: Chronic | ICD-10-CM

## 2020-06-10 DIAGNOSIS — G57.93 NEUROPATHY INVOLVING BOTH LOWER EXTREMITIES: ICD-10-CM

## 2020-06-10 DIAGNOSIS — I50.32 CHRONIC DIASTOLIC HEART FAILURE: Chronic | ICD-10-CM

## 2020-06-10 PROBLEM — K92.2 GIB (GASTROINTESTINAL BLEEDING): Status: ACTIVE | Noted: 2020-05-30

## 2020-06-10 RX ORDER — DEXAMETHASONE 4 MG/1
1 TABLET ORAL EVERY 12 HOURS
COMMUNITY
Start: 2020-06-02 | End: 2020-11-02

## 2020-06-10 RX ORDER — FOLIC ACID 1 MG/1
1000 TABLET ORAL DAILY
COMMUNITY
Start: 2020-06-02 | End: 2021-05-16 | Stop reason: SDUPTHER

## 2020-06-10 RX ORDER — ASPIRIN 325 MG/1
100 TABLET, FILM COATED ORAL DAILY
COMMUNITY
Start: 2020-06-02 | End: 2021-05-11

## 2020-06-10 RX ORDER — ALENDRONATE SODIUM 70 MG/1
TABLET ORAL
Qty: 12 TABLET | Refills: 0 | Status: SHIPPED | OUTPATIENT
Start: 2020-06-10 | End: 2020-10-29

## 2020-06-18 ENCOUNTER — HOSPITAL ENCOUNTER (OUTPATIENT)
Dept: RADIOLOGY | Facility: HOSPITAL | Age: 85
Discharge: HOME | End: 2020-06-18
Attending: INTERNAL MEDICINE
Payer: MEDICARE

## 2020-06-18 ENCOUNTER — OFFICE VISIT (OUTPATIENT)
Dept: FAMILY MEDICINE | Facility: CLINIC | Age: 85
End: 2020-06-18
Payer: MEDICARE

## 2020-06-18 VITALS
TEMPERATURE: 98 F | WEIGHT: 149 LBS | DIASTOLIC BLOOD PRESSURE: 91 MMHG | HEART RATE: 103 BPM | RESPIRATION RATE: 18 BRPM | HEIGHT: 59 IN | OXYGEN SATURATION: 97 % | BODY MASS INDEX: 30.04 KG/M2 | SYSTOLIC BLOOD PRESSURE: 151 MMHG

## 2020-06-18 DIAGNOSIS — I50.32 CHRONIC DIASTOLIC HEART FAILURE: ICD-10-CM

## 2020-06-18 DIAGNOSIS — I36.1 NONRHEUMATIC TRICUSPID VALVE REGURGITATION: Chronic | ICD-10-CM

## 2020-06-18 DIAGNOSIS — Z79.899 ENCOUNTER FOR LONG-TERM CURRENT USE OF MEDICATION: ICD-10-CM

## 2020-06-18 DIAGNOSIS — M25.471 PAIN AND SWELLING OF RIGHT ANKLE: ICD-10-CM

## 2020-06-18 DIAGNOSIS — N18.30 STAGE 3 CHRONIC KIDNEY DISEASE: ICD-10-CM

## 2020-06-18 DIAGNOSIS — E55.9 VITAMIN D INSUFFICIENCY: ICD-10-CM

## 2020-06-18 DIAGNOSIS — Z79.899 LONG TERM CURRENT USE OF DIURETIC: ICD-10-CM

## 2020-06-18 DIAGNOSIS — I36.1 NONRHEUMATIC TRICUSPID VALVE REGURGITATION: ICD-10-CM

## 2020-06-18 DIAGNOSIS — E78.2 MIXED HYPERLIPIDEMIA: ICD-10-CM

## 2020-06-18 DIAGNOSIS — G57.93 NEUROPATHY INVOLVING BOTH LOWER EXTREMITIES: ICD-10-CM

## 2020-06-18 DIAGNOSIS — M25.571 PAIN AND SWELLING OF RIGHT ANKLE: ICD-10-CM

## 2020-06-18 DIAGNOSIS — F41.9 ANXIETY: ICD-10-CM

## 2020-06-18 DIAGNOSIS — I11.0 BENIGN HYPERTENSIVE HEART DISEASE WITH HEART FAILURE: ICD-10-CM

## 2020-06-18 DIAGNOSIS — M85.89 OSTEOPENIA OF MULTIPLE SITES: ICD-10-CM

## 2020-06-18 DIAGNOSIS — N18.31 CKD STAGE G3A/A1, GFR 45-59 AND ALBUMIN CREATININE RATIO <30 MG/G: ICD-10-CM

## 2020-06-18 DIAGNOSIS — R73.03 PREDIABETES: ICD-10-CM

## 2020-06-18 DIAGNOSIS — M85.89 OSTEOPENIA OF MULTIPLE SITES: Chronic | ICD-10-CM

## 2020-06-18 DIAGNOSIS — K92.2 GASTROINTESTINAL HEMORRHAGE, UNSPECIFIED GASTROINTESTINAL HEMORRHAGE TYPE: ICD-10-CM

## 2020-06-18 DIAGNOSIS — K21.9 GASTROESOPHAGEAL REFLUX DISEASE WITHOUT ESOPHAGITIS: ICD-10-CM

## 2020-06-18 DIAGNOSIS — E78.2 MIXED HYPERLIPIDEMIA: Chronic | ICD-10-CM

## 2020-06-18 DIAGNOSIS — Z79.01 LONG TERM CURRENT USE OF ANTICOAGULANT THERAPY: Chronic | ICD-10-CM

## 2020-06-18 DIAGNOSIS — I13.0 BENIGN HYPERTENSIVE HEART AND KIDNEY DISEASE WITH CHF, NYHA CLASS 1 AND CKD STAGE 3: ICD-10-CM

## 2020-06-18 DIAGNOSIS — I13.0 BENIGN HYPERTENSIVE HEART AND KIDNEY DISEASE WITH CHF, NYHA CLASS 1 AND CKD STAGE 3: Chronic | ICD-10-CM

## 2020-06-18 DIAGNOSIS — K21.9 GASTROESOPHAGEAL REFLUX DISEASE WITHOUT ESOPHAGITIS: Chronic | ICD-10-CM

## 2020-06-18 DIAGNOSIS — I48.20 CHRONIC ATRIAL FIBRILLATION: ICD-10-CM

## 2020-06-18 DIAGNOSIS — N18.30 BENIGN HYPERTENSIVE HEART AND KIDNEY DISEASE WITH CHF, NYHA CLASS 1 AND CKD STAGE 3: Chronic | ICD-10-CM

## 2020-06-18 DIAGNOSIS — N18.30 BENIGN HYPERTENSIVE HEART AND KIDNEY DISEASE WITH CHF, NYHA CLASS 1 AND CKD STAGE 3: ICD-10-CM

## 2020-06-18 DIAGNOSIS — N18.31 CKD STAGE G3A/A1, GFR 45-59 AND ALBUMIN CREATININE RATIO <30 MG/G: Chronic | ICD-10-CM

## 2020-06-18 DIAGNOSIS — Z79.899 LONG TERM CURRENT USE OF DIURETIC: Chronic | ICD-10-CM

## 2020-06-18 DIAGNOSIS — I48.20 CHRONIC ATRIAL FIBRILLATION: Chronic | ICD-10-CM

## 2020-06-18 DIAGNOSIS — I11.0 BENIGN HYPERTENSIVE HEART DISEASE WITH HEART FAILURE: Chronic | ICD-10-CM

## 2020-06-18 DIAGNOSIS — D53.9 MACROCYTIC ANEMIA: ICD-10-CM

## 2020-06-18 DIAGNOSIS — Z79.01 LONG TERM CURRENT USE OF ANTICOAGULANT THERAPY: ICD-10-CM

## 2020-06-18 DIAGNOSIS — E66.09 CLASS 1 OBESITY DUE TO EXCESS CALORIES WITH SERIOUS COMORBIDITY AND BODY MASS INDEX (BMI) OF 30.0 TO 30.9 IN ADULT: ICD-10-CM

## 2020-06-18 DIAGNOSIS — Z09 HOSPITAL DISCHARGE FOLLOW-UP: Primary | ICD-10-CM

## 2020-06-18 DIAGNOSIS — Z79.891 LONG TERM CURRENT USE OF OPIATE ANALGESIC: ICD-10-CM

## 2020-06-18 DIAGNOSIS — I50.32 CHRONIC DIASTOLIC HEART FAILURE: Chronic | ICD-10-CM

## 2020-06-18 PROCEDURE — 71046 XR CHEST PA AND LATERAL: ICD-10-PCS | Mod: 26,,, | Performed by: RADIOLOGY

## 2020-06-18 PROCEDURE — 96372 PR INJECTION,THERAP/PROPH/DIAG2ST, IM OR SUBCUT: ICD-10-PCS | Mod: S$GLB,,, | Performed by: INTERNAL MEDICINE

## 2020-06-18 PROCEDURE — 72070 X-RAY EXAM THORAC SPINE 2VWS: CPT | Mod: TC,PO

## 2020-06-18 PROCEDURE — 72070 X-RAY EXAM THORAC SPINE 2VWS: CPT | Mod: 26,,, | Performed by: RADIOLOGY

## 2020-06-18 PROCEDURE — 99999 PR PBB SHADOW E&M-EST. PATIENT-LVL V: CPT | Mod: PBBFAC,,, | Performed by: INTERNAL MEDICINE

## 2020-06-18 PROCEDURE — 72070 XR THORACIC SPINE AP LATERAL: ICD-10-PCS | Mod: 26,,, | Performed by: RADIOLOGY

## 2020-06-18 PROCEDURE — 71046 X-RAY EXAM CHEST 2 VIEWS: CPT | Mod: 26,,, | Performed by: RADIOLOGY

## 2020-06-18 PROCEDURE — 71046 X-RAY EXAM CHEST 2 VIEWS: CPT | Mod: TC,PO

## 2020-06-18 PROCEDURE — 96372 THER/PROPH/DIAG INJ SC/IM: CPT | Mod: S$GLB,,, | Performed by: INTERNAL MEDICINE

## 2020-06-18 PROCEDURE — 99214 OFFICE O/P EST MOD 30 MIN: CPT | Mod: 25,S$GLB,, | Performed by: INTERNAL MEDICINE

## 2020-06-18 PROCEDURE — 99214 PR OFFICE/OUTPT VISIT, EST, LEVL IV, 30-39 MIN: ICD-10-PCS | Mod: 25,S$GLB,, | Performed by: INTERNAL MEDICINE

## 2020-06-18 PROCEDURE — 99999 PR PBB SHADOW E&M-EST. PATIENT-LVL V: ICD-10-PCS | Mod: PBBFAC,,, | Performed by: INTERNAL MEDICINE

## 2020-06-18 RX ORDER — SYRINGE,SAFETY WITH NEEDLE,1ML 25GX1"
SYRINGE (EA) MISCELLANEOUS
Qty: 90 EACH | Refills: 0 | Status: SHIPPED | OUTPATIENT
Start: 2020-06-18 | End: 2020-11-02

## 2020-06-18 RX ORDER — CARVEDILOL 12.5 MG/1
12.5 TABLET ORAL 2 TIMES DAILY WITH MEALS
Qty: 180 TABLET | Refills: 0 | Status: SHIPPED | OUTPATIENT
Start: 2020-06-18 | End: 2020-10-28

## 2020-06-18 RX ORDER — CYANOCOBALAMIN 1000 UG/ML
1000 INJECTION, SOLUTION INTRAMUSCULAR; SUBCUTANEOUS
Qty: 2 ML | Refills: 1 | Status: SHIPPED | OUTPATIENT
Start: 2020-08-06 | End: 2020-09-06

## 2020-06-18 RX ORDER — CYANOCOBALAMIN 1000 UG/ML
1000 INJECTION, SOLUTION INTRAMUSCULAR; SUBCUTANEOUS
Status: COMPLETED | OUTPATIENT
Start: 2020-06-18 | End: 2020-06-18

## 2020-06-18 RX ORDER — CYANOCOBALAMIN 1000 UG/ML
1000 INJECTION, SOLUTION INTRAMUSCULAR; SUBCUTANEOUS WEEKLY
Qty: 3 ML | Refills: 0 | Status: SHIPPED | OUTPATIENT
Start: 2020-06-25 | End: 2020-07-10

## 2020-06-18 RX ORDER — BUSPIRONE HYDROCHLORIDE 5 MG/1
TABLET ORAL
Qty: 60 TABLET | Refills: 0 | Status: SHIPPED | OUTPATIENT
Start: 2020-06-18 | End: 2020-07-14 | Stop reason: SDUPTHER

## 2020-06-18 RX ORDER — FUROSEMIDE 20 MG/1
20 TABLET ORAL DAILY
Qty: 90 TABLET | Refills: 1
Start: 2020-06-18 | End: 2020-10-12 | Stop reason: SDUPTHER

## 2020-06-18 RX ORDER — CITALOPRAM 10 MG/1
10 TABLET ORAL NIGHTLY
Qty: 30 TABLET | Refills: 0 | Status: SHIPPED | OUTPATIENT
Start: 2020-06-18 | End: 2020-07-14 | Stop reason: SDUPTHER

## 2020-06-18 RX ADMIN — CYANOCOBALAMIN 1000 MCG: 1000 INJECTION, SOLUTION INTRAMUSCULAR; SUBCUTANEOUS at 03:06

## 2020-06-18 NOTE — PROGRESS NOTES
Subjective:      Patient ID: Miriam Oro is a 86 y.o. female.    Chief Complaint: Hospital Follow Up    HPI     Ms. Marcos is a 86F here for hospital follow up.     All medical records including provider summary, labs, and imaging reviewed. Medication list reconciled and reviewed with patient. Discharge medications reconciled with the current medication list in outpatient medical record.    Admitted to [facility] from 05/30-06/02/2020 after 2 day history of black tarry stools and syncopal event.  EMS was activated.  She was admitted to hospital medicine and underwent endoscopy as well as colonoscopy.  She was anemic and given blood.  No source of bleeding was noted.  Suspected diverticular bleed as culprit. Repeat H&H recommended.  Aspirin was resumed on discharge.  Pradaxa was held.  Following up with gastroenterology in 2 weeks with likely capsule endoscopy.    Since discharge she is still having discomfort of her epigastric region of her abdomen radiating to the back.  She cannot tell if the pain is coming from her spine or her abdomen.  GI is going to wait a few weeks until deciding about the capsule. Her granddaughter notes that she had fallen before being found.     No further dark stools or hematochezia since discharge.  She has been feeling more fatigued and notes that her lower extremities are more edematous.  She does follow with Pain Management, but her  does not reflect her refills, but I have confirmed with her pharmacy that she does follow with Dr. Mariano Noonan.  She denies any weakness of her lower extremities, but does feel more neuropathic type pain.  We did discuss obtaining laboratory dated to ensure that she does not have B12 deficiency as she is noted to have a macrocytosis.  Blood pressure is not at goal today.  We discussed increasing the Coreg to twice daily as well as the Lasix to daily.  They would like to consolidate care within our system, therefore cardiology referral placed.   Imaging ordered for rule out of fracture.  All other medications up-to-date.    She does feel overwhelmed and mood is down with anxiety.  She lost her  within the last few years and has moved her oldest granddaughter and her family in to her home.  Her health has declined and she feels somewhat overwhelmed with her lack of independence.  She is tearful today.  We discussed starting low-dose SSRI and BuSpar very low dose as needed.  Support given.      Depression Patient Health Questionnaire 6/18/2020 12/9/2019   Over the last two weeks how often have you been bothered by little interest or pleasure in doing things 0 0   Over the last two weeks how often have you been bothered by feeling down, depressed or hopeless 0 0   PHQ-2 Total Score 0 0       Review of patient's allergies indicates:   Allergen Reactions    Meloxicam Other (See Comments)     Decreases renal function    Nitrofurantoin monohyd/m-cryst Itching       Current Outpatient Medications:     alendronate (FOSAMAX) 70 MG tablet, TAKE ONE CAPSULE BY MOUTH ONCE PER WEEK AS DIRECTED, Disp: 12 tablet, Rfl: 0    aspirin 81 MG Chew, Take 81 mg by mouth once daily., Disp: , Rfl:     carvediloL (COREG) 12.5 MG tablet, Take 1 tablet (12.5 mg total) by mouth 2 (two) times daily with meals., Disp: 180 tablet, Rfl: 0    cyclobenzaprine (FLEXERIL) 10 MG tablet, Take 10 mg by mouth nightly. at bedtime., Disp: , Rfl: 1    ferrous sulfate (FEOSOL) 325 mg (65 mg iron) Tab tablet, Take 325 mg by mouth once daily., Disp: , Rfl:     FLOVENT  mcg/actuation inhaler, , Disp: , Rfl:     folic acid (FOLVITE) 1 MG tablet, Take 1,000 mcg by mouth once daily., Disp: , Rfl:     furosemide (LASIX) 20 MG tablet, Take 1 tablet (20 mg total) by mouth once daily., Disp: 90 tablet, Rfl: 1    gabapentin (NEURONTIN) 100 MG capsule, Take 200 mg by mouth 3 (three) times daily. , Disp: , Rfl: 1    HYDROcodone-acetaminophen (NORCO) 7.5-325 mg per tablet, Take 1 tablet by  mouth every 6 (six) hours as needed for Pain., Disp: , Rfl:     nystatin (MYCOSTATIN) powder, Apply 1 application topically 4 (four) times daily., Disp: , Rfl:     pantoprazole (PROTONIX) 40 MG tablet, TAKE ONE TABLET BY MOUTH EVERY DAY, Disp: 90 tablet, Rfl: 0    pravastatin (PRAVACHOL) 40 MG tablet, Take 1 tablet (40 mg total) by mouth nightly. at bedtime., Disp: 90 tablet, Rfl: 0    thiamine mononitrate, vit B1, (VITAMIN B-1, MONONITRATE,) 100 mg Tab, Take 100 mg by mouth once daily., Disp: , Rfl:     busPIRone (BUSPAR) 5 MG Tab, Start with as needed, but may increase to bid or bid prn, Disp: 60 tablet, Rfl: 0    cholecalciferol, vitamin D3, (VITAMIN D3) 50 mcg (2,000 unit) Cap, Take 1 capsule (2,000 Units total) by mouth once daily., Disp: 90 capsule, Rfl: 1    citalopram (CELEXA) 10 MG tablet, Take 1 tablet (10 mg total) by mouth every evening., Disp: 30 tablet, Rfl: 0    [START ON 6/25/2020] cyanocobalamin 1,000 mcg/mL injection, Inject 1 mL (1,000 mcg total) into the muscle once a week. for 3 doses, Disp: 3 mL, Rfl: 0    [START ON 8/6/2020] cyanocobalamin 1,000 mcg/mL injection, Inject 1 mL (1,000 mcg total) into the muscle every 30 days. for 2 doses, Disp: 2 mL, Rfl: 1    insulin syringe-needle U-100 1 mL 31 gauge x 5/16 Syrg, Use as directed tid, Disp: 90 each, Rfl: 0    Past Medical History:   Diagnosis Date    Age-related osteoporosis without current pathological fracture 8/13/2018    Chronic atrial fibrillation 8/13/2018    Chronic diastolic heart failure 8/13/2018    Mixed hyperlipidemia 8/13/2018    Osteopenia of multiple sites 12/9/2019    RA (rheumatoid arthritis)     Tricuspid valve regurgitation 9/19/2018     Past Surgical History:   Procedure Laterality Date    TOTAL ABDOMINAL HYSTERECTOMY W/ BILATERAL SALPINGOOPHORECTOMY       Family History   Problem Relation Age of Onset    Aneurysm Mother      Social History     Socioeconomic History    Marital status:      Spouse  name: Not on file    Number of children: Not on file    Years of education: Not on file    Highest education level: Not on file   Occupational History    Not on file   Social Needs    Financial resource strain: Not on file    Food insecurity     Worry: Not on file     Inability: Not on file    Transportation needs     Medical: Not on file     Non-medical: Not on file   Tobacco Use    Smoking status: Never Smoker    Smokeless tobacco: Never Used   Substance and Sexual Activity    Alcohol use: Not Currently    Drug use: Never    Sexual activity: Not Currently     Partners: Male     Birth control/protection: Surgical   Lifestyle    Physical activity     Days per week: Not on file     Minutes per session: Not on file    Stress: Not on file   Relationships    Social connections     Talks on phone: Not on file     Gets together: Not on file     Attends [employer] service: Not on file     Active member of club or organization: Not on file     Attends meetings of clubs or organizations: Not on file     Relationship status: Not on file   Other Topics Concern    Not on file   Social History Narrative    Not on file      Review of Systems   Constitutional: Positive for activity change and fatigue. Negative for chills, fever and unexpected weight change.   HENT: Negative for congestion.    Eyes: Negative for visual disturbance.   Respiratory: Negative for cough, shortness of breath and wheezing.    Cardiovascular: Positive for leg swelling. Negative for chest pain and palpitations.   Gastrointestinal: Positive for abdominal pain. Negative for blood in stool, diarrhea and nausea.   Endocrine: Negative for cold intolerance and heat intolerance.   Genitourinary: Negative for dysuria.   Musculoskeletal: Positive for arthralgias and back pain. Negative for gait problem and myalgias.   Skin: Negative for rash.   Allergic/Immunologic: Negative for environmental allergies.   Neurological: Positive for numbness  "(neuropathy). Negative for dizziness, syncope, weakness, light-headedness and headaches.   Hematological: Bruises/bleeds easily.   Psychiatric/Behavioral: Positive for dysphoric mood. Negative for confusion, sleep disturbance and suicidal ideas. The patient is nervous/anxious.          Objective:     Body mass index is 30.09 kg/m².  BP (!) 151/91 (BP Location: Right arm, Patient Position: Sitting, BP Method: Medium (Automatic))   Pulse 103   Temp 98.3 °F (36.8 °C)   Resp 18   Ht 4' 11" (1.499 m)   Wt 67.6 kg (149 lb)   SpO2 97%   BMI 30.09 kg/m²       Physical Exam  Vitals signs and nursing note reviewed.   Constitutional:       General: She is not in acute distress.     Appearance: Normal appearance. She is well-developed. She is not ill-appearing, toxic-appearing or diaphoretic.      Comments: Here with granddaughter   HENT:      Head: Normocephalic and atraumatic.   Eyes:      Conjunctiva/sclera: Conjunctivae normal.   Cardiovascular:      Rate and Rhythm: Normal rate. Rhythm irregular.      Heart sounds: Normal heart sounds. No murmur.      Comments: Fib- irregularly irregular  Pulmonary:      Effort: Pulmonary effort is normal.      Breath sounds: Normal breath sounds.      Comments: Clear.  O2 sat 97%  Abdominal:      General: Bowel sounds are normal.      Palpations: Abdomen is soft.      Tenderness: There is no abdominal tenderness. There is no right CVA tenderness or left CVA tenderness.      Comments: No abdominal pain or guarding in epigastric region   Musculoskeletal:         General: No tenderness.      Right lower leg: Edema present.      Left lower leg: Edema present.      Comments: 2+ BLE edema to knee    Pain elicited in mid lumbar spine with overhead stretching.  No tenderness to percussion over the spinous processes.  No paraspinal lumbar tenderness or spasm.  Pain reproduced by taking deep inspiration.   Lymphadenopathy:      Cervical: No cervical adenopathy.   Skin:     General: Skin is " warm and dry.      Capillary Refill: Capillary refill takes 2 to 3 seconds.   Neurological:      General: No focal deficit present.      Mental Status: She is alert and oriented to person, place, and time.      Sensory: No sensory deficit.      Motor: No weakness.      Coordination: Coordination normal (slightly unsteady on feet).      Gait: Gait normal.   Psychiatric:         Thought Content: Thought content normal.         Judgment: Judgment normal.      Comments: Tearful, anxious.          Lab Visit on 06/18/2020   Component Date Value Ref Range Status    WBC 06/18/2020 6.54  3.90 - 12.70 K/uL Final    RBC 06/18/2020 3.20* 4.00 - 5.40 M/uL Final    Hemoglobin 06/18/2020 10.4* 12.0 - 16.0 g/dL Final    Hematocrit 06/18/2020 33.5* 37.0 - 48.5 % Final    Mean Corpuscular Volume 06/18/2020 105* 82 - 98 fL Final    Mean Corpuscular Hemoglobin 06/18/2020 32.5* 27.0 - 31.0 pg Final    Mean Corpuscular Hemoglobin Conc 06/18/2020 31.0* 32.0 - 36.0 g/dL Final    RDW 06/18/2020 15.5* 11.5 - 14.5 % Final    Platelets 06/18/2020 244  150 - 350 K/uL Final    MPV 06/18/2020 10.5  9.2 - 12.9 fL Final    Immature Granulocytes 06/18/2020 0.3  0.0 - 0.5 % Final    Gran # (ANC) 06/18/2020 4.0  1.8 - 7.7 K/uL Final    Immature Grans (Abs) 06/18/2020 0.02  0.00 - 0.04 K/uL Final    Comment: Mild elevation in immature granulocytes is non specific and   can be seen in a variety of conditions including stress response,   acute inflammation, trauma and pregnancy. Correlation with other   laboratory and clinical findings is essential.      Lymph # 06/18/2020 1.8  1.0 - 4.8 K/uL Final    Mono # 06/18/2020 0.5  0.3 - 1.0 K/uL Final    Eos # 06/18/2020 0.2  0.0 - 0.5 K/uL Final    Baso # 06/18/2020 0.02  0.00 - 0.20 K/uL Final    nRBC 06/18/2020 0  0 /100 WBC Final    Gran% 06/18/2020 61.2  38.0 - 73.0 % Final    Lymph% 06/18/2020 26.8  18.0 - 48.0 % Final    Mono% 06/18/2020 8.3  4.0 - 15.0 % Final    Eosinophil%  06/18/2020 3.1  0.0 - 8.0 % Final    Basophil% 06/18/2020 0.3  0.0 - 1.9 % Final    Differential Method 06/18/2020 Automated   Final    Sodium 06/18/2020 139  136 - 145 mmol/L Final    Potassium 06/18/2020 4.4  3.5 - 5.1 mmol/L Final    Chloride 06/18/2020 106  95 - 110 mmol/L Final    CO2 06/18/2020 25  23 - 29 mmol/L Final    Glucose 06/18/2020 103  70 - 110 mg/dL Final    BUN, Bld 06/18/2020 18  8 - 23 mg/dL Final    Creatinine 06/18/2020 1.2  0.5 - 1.4 mg/dL Final    Calcium 06/18/2020 8.9  8.7 - 10.5 mg/dL Final    Total Protein 06/18/2020 7.2  6.0 - 8.4 g/dL Final    Albumin 06/18/2020 3.8  3.5 - 5.2 g/dL Final    Total Bilirubin 06/18/2020 0.3  0.1 - 1.0 mg/dL Final    Comment: For infants and newborns, interpretation of results should be based  on gestational age, weight and in agreement with clinical  observations.  Premature Infant recommended reference ranges:  Up to 24 hours.............<8.0 mg/dL  Up to 48 hours............<12.0 mg/dL  3-5 days..................<15.0 mg/dL  6-29 days.................<15.0 mg/dL      Alkaline Phosphatase 06/18/2020 104  55 - 135 U/L Final    AST 06/18/2020 19  10 - 40 U/L Final    ALT 06/18/2020 10  10 - 44 U/L Final    Anion Gap 06/18/2020 8  8 - 16 mmol/L Final    eGFR if  06/18/2020 47.3* >60 mL/min/1.73 m^2 Final    eGFR if non African American 06/18/2020 41.0* >60 mL/min/1.73 m^2 Final    Comment: Calculation used to obtain the estimated glomerular filtration  rate (eGFR) is the CKD-EPI equation.       Iron 06/18/2020 64  30 - 160 ug/dL Final    Transferrin 06/18/2020 272  200 - 375 mg/dL Final    TIBC 06/18/2020 403  250 - 450 ug/dL Final    Saturated Iron 06/18/2020 16* 20 - 50 % Final    Ferritin 06/18/2020 59  20.0 - 300.0 ng/mL Final    TSH 06/18/2020 1.409  0.400 - 4.000 uIU/mL Final    Vit D, 25-Hydroxy 06/18/2020 24* 30 - 96 ng/mL Final    Comment: Vitamin D deficiency.........<10 ng/mL                               Vitamin D insufficiency......10-29 ng/mL       Vitamin D sufficiency........> or equal to 30 ng/mL  Vitamin D toxicity............>100 ng/mL      Magnesium 06/18/2020 2.2  1.6 - 2.6 mg/dL Final    Hemoglobin A1C 06/18/2020 5.4  4.0 - 5.6 % Final    Comment: ADA Screening Guidelines:  5.7-6.4%  Consistent with prediabetes  >or=6.5%  Consistent with diabetes  High levels of fetal hemoglobin interfere with the HbA1C  assay. Heterozygous hemoglobin variants (HbS, HgC, etc)do  not significantly interfere with this assay.   However, presence of multiple variants may affect accuracy.      Estimated Avg Glucose 06/18/2020 108  68 - 131 mg/dL Final    BNP 06/18/2020 280* 0 - 99 pg/mL Final    Values of less than 100 pg/ml are consistent with non-CHF populations.   Lab Visit on 06/18/2020   Component Date Value Ref Range Status    Microalbum.,U,Random 06/18/2020 18.0  ug/mL Final    Creatinine, Random Ur 06/18/2020 236.0  15.0 - 325.0 mg/dL Final    Comment: The random urine reference ranges provided were established   for 24 hour urine collections.  No reference ranges exist for  random urine specimens.  Correlate clinically.      Microalb Creat Ratio 06/18/2020 7.6  0.0 - 30.0 ug/mg Final     No results found in the last 24 hours.   Assessment:     Encounter Diagnoses   Name Primary?    Hospital discharge follow-up Yes    Neuropathy involving both lower extremities     Chronic atrial fibrillation     Benign hypertensive heart disease with heart failure     Chronic diastolic heart failure     Mixed hyperlipidemia     Nonrheumatic tricuspid valve regurgitation     Stage 3 chronic kidney disease     Gastroesophageal reflux disease without esophagitis     Prediabetes     Encounter for long-term current use of medication     Macrocytic anemia     Gastrointestinal hemorrhage, unspecified gastrointestinal hemorrhage type     Long term current use of diuretic     Long term current use of anticoagulant  therapy    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, NYHA class 1 and CKD stage 3     Vitamin D insufficiency     Osteopenia of multiple sites     CKD stage G3a/A1, GFR 45-59 and albumin creatinine ratio <30 mg/g     Anxiety     Class 1 obesity due to excess calories with serious comorbidity and body mass index (BMI) of 30.0 to 30.9 in adult     Long term current use of opiate analgesic         Plan:     #Hospital discharge follow up  All medical records including provider summary, labs, and imaging reviewed. Medication list reconciled and reviewed with patient. Discharge medications reconciled with the current medication list in outpatient medical record.    # GI bleed  -colonoscopy and patient did not localize source, but suspected to be diverticular.  Plan for outpatient endoscopy and likely capsule endoscopy with GI.  Pradaxa is held.  CT abdomen pelvis from 05/30 showed nonobstructing right nephrolith and chronic L3 and left pubic rami fractures and degenerative changes of L4-L5.  -recheck hemoglobin and hematocrit.  H/H on discharge 9.2/29.2.  Hugh 7.2/22.6.  MCV on discharge 01/04 0.7.    #Lumbar back pain  -r/o fracture with lspine and pelvic xray  -hemodynamically stable. No red flags on exam     #Anxiety, dysphoric mood  -2/2 health issues   -start celexa 10 mg nightly. buspar PRN anxiety (advised PRN basis)  -support given    #Chronic Afib on NOAC, controlled  #HTN- not at goal  -Continue ASA 81 mg daily   -increase coreg from 12.5 mg daily to BID for HTN  -Rate controlled  -Follows with Dr. Atwood  -8/31/18: TSH 2.68. Repeat   -Pradaxa held on d/c  Lab Results   Component Value Date    TSH 1.409 06/18/2020       #Osteopenia  -Previously osteoporosis in 1/2013.    -Dexa 8/29/18- now osteopenia, however, considering h/o compression fracture opted to continue bisphosphonate therapy with fosamax  -Repeat dexa in 2021  -Continue Calcium and Vitamin D  -10/30/18: vit d 38.8 (insufficient  in 2017- 25.7). repeat with mag     #HFpEF  Euvolemic.  -Follows with Dr. Atwood. Referral to dr. bedoya  -Lasix 20 mg BID PRN  -TTE 6/1/20:  EF 55-60%, increased left atrial pressure and severe dilation of left and right atrium.  Moderate to severe tricuspid regurgitation, RVSP elevated at 50-60.     #HTN c/b CKD stage 3  -9/26/19: Cr 1.08, GFR 48, Urine protein/cr ratio 0.09, Uric acid 5.9 iPTH 66, phos 3.6, mg 2.1   -Follows with Dr. Grant  -06/02/2020:  Phosphorus 2.8, Mag 2.2, GFR 47, creatinine 1.1  -increase coreg to 12.5 mg BID    # hypokalemia  -potassium 3.1 on discharge.  Repeat.     #GERD  -Continue Protonix daily (9/26/19: Mg 2.1). repeat      #HLD  -Pravastatin 40 mg daily  Lab Results   Component Value Date    CHOL 146 12/09/2019    TRIG 92 12/09/2019    HDL 62 12/09/2019    LDLCALC 65.6 12/09/2019     #Prediabetes  -10/30/18: ha1c 5.8% --> 9/26/19: ha1c 6.3%  -repeat  Lab Results   Component Value Date    HGBA1C 5.4 06/18/2020     #Obesity- BMI 30.09  -She has gained about 5 pounds since last visit. Encouraged to eat healthy with more weight bearing exercises  Wt Readings from Last 1 Encounters:   06/18/20 1446 67.6 kg (149 lb)     #Macrocytic anemia with neuropathy of BLE  #B12 deficiency  9/26/19: 12.4/38. .3  -Continue Iron daily (previous GI bleed)  -b12 164, folate 13.2 on 5/31/20  -start b12 injections- granddaughter will administer. Today in clinic #1, 6/18 then weekly x 3 doses then monthly x 2 doses then repeat levels.   Lab Results   Component Value Date    [id number] 258 12/09/2019     Lab Results   Component Value Date    FOLATE 10.5 12/09/2019     #Chronic Pain  -Sees pain management.  reviewed with last fill 7/2018 ( not syncing)  -Continue Norco and Gabapentin      #Preventative Care  -Influenza UTD - given at [facility]. Not showing in links.  -TDAP 1/1/2005  -Prevnar 4/8/19. Pneumovax 10/30/2003  -Shingrix rx given   -Mammogram 2014. Birads 1.     Has mychart. Follow up in 3  months.      Randi Oro M.D.    Orders Placed This Encounter   Procedures    X-Ray Chest PA And Lateral    X-Ray Thoracic Spine AP Lateral    Methylmalonic Acid, Serum    Vitamin B12    CBC auto differential    Comprehensive metabolic panel    Iron and TIBC    Ferritin    TSH    Vitamin D    Magnesium    Hemoglobin A1C    Microalbumin/creatinine urine ratio    Brain Natriuretic Peptide    CBC auto differential    Ambulatory referral/consult to Cardiology      Medications Ordered This Encounter   Medications    busPIRone (BUSPAR) 5 MG Tab     Sig: Start with as needed, but may increase to bid or bid prn     Dispense:  60 tablet     Refill:  0    carvediloL (COREG) 12.5 MG tablet     Sig: Take 1 tablet (12.5 mg total) by mouth 2 (two) times daily with meals.     Dispense:  180 tablet     Refill:  0     .    cholecalciferol, vitamin D3, (VITAMIN D3) 50 mcg (2,000 unit) Cap     Sig: Take 1 capsule (2,000 Units total) by mouth once daily.     Dispense:  90 capsule     Refill:  1    citalopram (CELEXA) 10 MG tablet     Sig: Take 1 tablet (10 mg total) by mouth every evening.     Dispense:  30 tablet     Refill:  0    cyanocobalamin 1,000 mcg/mL injection     Sig: Inject 1 mL (1,000 mcg total) into the muscle once a week. for 3 doses     Dispense:  3 mL     Refill:  0    cyanocobalamin 1,000 mcg/mL injection     Sig: Inject 1 mL (1,000 mcg total) into the muscle every 30 days. for 2 doses     Dispense:  2 mL     Refill:  1    cyanocobalamin injection 1,000 mcg    furosemide (LASIX) 20 MG tablet     Sig: Take 1 tablet (20 mg total) by mouth once daily.     Dispense:  90 tablet     Refill:  1    insulin syringe-needle U-100 1 mL 31 gauge x 5/16 Syrg     Sig: Use as directed tid     Dispense:  90 each     Refill:  0

## 2020-06-19 PROBLEM — R79.89 ELEVATED BRAIN NATRIURETIC PEPTIDE (BNP) LEVEL: Status: ACTIVE | Noted: 2020-06-19

## 2020-06-19 RX ORDER — HYDROCODONE BITARTRATE AND ACETAMINOPHEN 7.5; 325 MG/1; MG/1
1 TABLET ORAL EVERY 6 HOURS PRN
COMMUNITY

## 2020-06-19 RX ORDER — ACETAMINOPHEN 500 MG
1 TABLET ORAL DAILY
Qty: 90 CAPSULE | Refills: 1 | Status: SHIPPED | OUTPATIENT
Start: 2020-06-19 | End: 2021-05-16 | Stop reason: SDUPTHER

## 2020-06-22 PROBLEM — Z79.891 LONG TERM CURRENT USE OF OPIATE ANALGESIC: Status: ACTIVE | Noted: 2020-06-22

## 2020-06-22 PROBLEM — F41.9 ANXIETY: Status: ACTIVE | Noted: 2020-06-22

## 2020-06-29 ENCOUNTER — OFFICE VISIT (OUTPATIENT)
Dept: CARDIOLOGY | Facility: CLINIC | Age: 85
End: 2020-06-29
Payer: MEDICARE

## 2020-06-29 VITALS
DIASTOLIC BLOOD PRESSURE: 83 MMHG | BODY MASS INDEX: 30.04 KG/M2 | WEIGHT: 149 LBS | HEART RATE: 82 BPM | HEIGHT: 59 IN | SYSTOLIC BLOOD PRESSURE: 146 MMHG

## 2020-06-29 DIAGNOSIS — Z79.01 LONG TERM CURRENT USE OF ANTICOAGULANT THERAPY: Chronic | ICD-10-CM

## 2020-06-29 DIAGNOSIS — N18.31 CKD STAGE G3A/A1, GFR 45-59 AND ALBUMIN CREATININE RATIO <30 MG/G: Chronic | ICD-10-CM

## 2020-06-29 DIAGNOSIS — I48.20 CHRONIC ATRIAL FIBRILLATION: Primary | Chronic | ICD-10-CM

## 2020-06-29 DIAGNOSIS — E78.2 MIXED HYPERLIPIDEMIA: Chronic | ICD-10-CM

## 2020-06-29 PROBLEM — R79.89 ELEVATED BRAIN NATRIURETIC PEPTIDE (BNP) LEVEL: Status: RESOLVED | Noted: 2020-06-19 | Resolved: 2020-06-29

## 2020-06-29 PROCEDURE — 99999 PR PBB SHADOW E&M-EST. PATIENT-LVL III: ICD-10-PCS | Mod: PBBFAC,,, | Performed by: INTERNAL MEDICINE

## 2020-06-29 PROCEDURE — 99204 OFFICE O/P NEW MOD 45 MIN: CPT | Mod: S$GLB,,, | Performed by: INTERNAL MEDICINE

## 2020-06-29 PROCEDURE — 1126F PR PAIN SEVERITY QUANTIFIED, NO PAIN PRESENT: ICD-10-PCS | Mod: S$GLB,,, | Performed by: INTERNAL MEDICINE

## 2020-06-29 PROCEDURE — 99999 PR PBB SHADOW E&M-EST. PATIENT-LVL III: CPT | Mod: PBBFAC,,, | Performed by: INTERNAL MEDICINE

## 2020-06-29 PROCEDURE — 1101F PT FALLS ASSESS-DOCD LE1/YR: CPT | Mod: CPTII,S$GLB,, | Performed by: INTERNAL MEDICINE

## 2020-06-29 PROCEDURE — 99204 PR OFFICE/OUTPT VISIT, NEW, LEVL IV, 45-59 MIN: ICD-10-PCS | Mod: S$GLB,,, | Performed by: INTERNAL MEDICINE

## 2020-06-29 PROCEDURE — 1126F AMNT PAIN NOTED NONE PRSNT: CPT | Mod: S$GLB,,, | Performed by: INTERNAL MEDICINE

## 2020-06-29 PROCEDURE — 1159F MED LIST DOCD IN RCRD: CPT | Mod: S$GLB,,, | Performed by: INTERNAL MEDICINE

## 2020-06-29 PROCEDURE — 1101F PR PT FALLS ASSESS DOC 0-1 FALLS W/OUT INJ PAST YR: ICD-10-PCS | Mod: CPTII,S$GLB,, | Performed by: INTERNAL MEDICINE

## 2020-06-29 PROCEDURE — 1159F PR MEDICATION LIST DOCUMENTED IN MEDICAL RECORD: ICD-10-PCS | Mod: S$GLB,,, | Performed by: INTERNAL MEDICINE

## 2020-06-29 NOTE — PROGRESS NOTES
Subjective:    Patient ID:  Miriam Oro is a 86 y.o. female who presents for evaluation of new pt (new pt- ref from pcp) and Hospital Follow Up      HPIVery pleasant 85 yo WF with chronic atrial fib who was in the hospital one month ago with GI bleed. Had upper and lower endoscopy but no definitive cause. Had been on pradaxa 150 mg BID. Currently on 81 mg ASA. No recurrent bleeding and asymptomatic from her AF.    Review of Systems   Constitution: Negative for decreased appetite, fever, malaise/fatigue, weight gain and weight loss.   HENT: Negative for hearing loss and nosebleeds.    Eyes: Negative for visual disturbance.   Cardiovascular: Negative for chest pain, claudication, cyanosis, dyspnea on exertion, irregular heartbeat, leg swelling, near-syncope, orthopnea, palpitations, paroxysmal nocturnal dyspnea and syncope.   Respiratory: Negative for cough, hemoptysis, shortness of breath, sleep disturbances due to breathing, snoring and wheezing.    Endocrine: Negative for cold intolerance, heat intolerance, polydipsia and polyuria.   Hematologic/Lymphatic: Negative for adenopathy and bleeding problem. Does not bruise/bleed easily.   Skin: Negative for color change, itching, poor wound healing, rash and suspicious lesions.   Musculoskeletal: Positive for arthritis and joint pain. Negative for back pain, falls, joint swelling, muscle cramps, muscle weakness and myalgias.   Gastrointestinal: Negative for bloating, abdominal pain, change in bowel habit, constipation, flatus, heartburn, hematemesis, hematochezia, hemorrhoids, jaundice, melena, nausea and vomiting.   Genitourinary: Negative for bladder incontinence, decreased libido, frequency, hematuria, hesitancy and urgency.   Neurological: Negative for brief paralysis, difficulty with concentration, excessive daytime sleepiness, dizziness, focal weakness, headaches, light-headedness, loss of balance, numbness, vertigo and weakness.   Psychiatric/Behavioral:  "Negative for altered mental status, depression and memory loss. The patient does not have insomnia and is not nervous/anxious.    Allergic/Immunologic: Negative for environmental allergies, hives and persistent infections.        Objective:    Physical Exam   Constitutional: She is oriented to person, place, and time. She appears well-developed and well-nourished.   BP (!) 146/83   Pulse 82   Ht 4' 11" (1.499 m)   Wt 67.6 kg (149 lb)   BMI 30.09 kg/m²      HENT:   Head: Normocephalic and atraumatic.   Right Ear: External ear normal.   Left Ear: External ear normal.   Nose: Nose normal.   Mouth/Throat: Oropharynx is clear and moist.   Eyes: Pupils are equal, round, and reactive to light. Conjunctivae, EOM and lids are normal. Right eye exhibits no discharge. Left eye exhibits no discharge. Right conjunctiva has no hemorrhage. No scleral icterus.   Neck: Normal range of motion. Neck supple. No JVD present. No tracheal deviation present. No thyromegaly present.   Cardiovascular: Normal rate, normal heart sounds and intact distal pulses. An irregularly irregular rhythm present. Exam reveals no gallop and no friction rub.   No murmur heard.  Pulmonary/Chest: Effort normal and breath sounds normal. No respiratory distress. She has no wheezes. She has no rales. She exhibits no tenderness. Breasts are symmetrical.   Abdominal: Soft. Bowel sounds are normal. She exhibits no distension and no mass. There is no hepatosplenomegaly or hepatomegaly. There is no abdominal tenderness. There is no rebound and no guarding.   Musculoskeletal: Normal range of motion.         General: No tenderness or edema.   Lymphadenopathy:     She has no cervical adenopathy.   Neurological: She is alert and oriented to person, place, and time. She displays normal reflexes. No cranial nerve deficit. Coordination normal.   Skin: Skin is warm and dry. No rash noted. No erythema. No pallor.   Psychiatric: She has a normal mood and affect. Her " behavior is normal. Judgment and thought content normal.   Nursing note and vitals reviewed.        Assessment:       1. Chronic atrial fibrillation    2. CKD stage G3a/A1, GFR 45-59 and albumin creatinine ratio <30 mg/g    3. Long term current use of anticoagulant therapy    4. Mixed hyperlipidemia         Plan:     Discussed risk and benefits of anticoagulation. Because patient is in excellent health and great mental status for her age would like to try eliquis 2.5 mg BID for stroke prevention. Dc ASA    She knows to stop it if any signs of unusual bleeding    No orders of the defined types were placed in this encounter.    Follow up in about 3 months (around 9/29/2020).

## 2020-07-14 DIAGNOSIS — F41.9 ANXIETY: ICD-10-CM

## 2020-07-14 RX ORDER — BUSPIRONE HYDROCHLORIDE 5 MG/1
TABLET ORAL
Qty: 60 TABLET | Refills: 0 | Status: SHIPPED | OUTPATIENT
Start: 2020-07-14 | End: 2020-07-28 | Stop reason: SDUPTHER

## 2020-07-14 RX ORDER — CITALOPRAM 10 MG/1
10 TABLET ORAL NIGHTLY
Qty: 30 TABLET | Refills: 0 | Status: SHIPPED | OUTPATIENT
Start: 2020-07-14 | End: 2020-07-28 | Stop reason: SDUPTHER

## 2020-07-14 NOTE — TELEPHONE ENCOUNTER
----- Message from Avanimauro Stafford sent at 7/14/2020  1:35 PM CDT -----  Contact: Amaya/Grand fernandez/[phone]  Requesting an RX refill or new RX.  Is this a refill or new RX: refill 1   RX name and strength:busPIRone (BUSPAR) 5 MG Tab   Directions (copy/paste from chart):    Is this a 30 day or 90 day RX:    Local pharmacy or mail order pharmacy:  Local pharmacy  Pharmacy name and phone # (copy/paste from chart):   [facility] [place], [place] - [address] [phone] (Phone)  [phone] (Fax)  Comments:      Requesting an RX refill or new RX.  Is this a refill or new RX: refill 1   RX name and strength:citalopram (CELEXA) 10 MG tablet   Directions (copy/paste from chart):    Is this a 30 day or 90 day RX:    Local pharmacy or mail order pharmacy:  Local pharmacy  Pharmacy name and phone # (copy/paste from chart):   [facility] [place], [place] - [address] [phone] (Phone)  [phone] (Fax)  Comments:        Requesting an RX refill or new RX.  Is this a refill or new RX: refill 1   RX name and strength:thiamine mononitrate, vit B1, (VITAMIN B-1, MONONITRATE,) 100 mg Tab  Directions (copy/paste from chart):    Is this a 30 day or 90 day RX:    Local pharmacy or mail order pharmacy:  [facility]  Pharmacy name and phone # (copy/paste from chart):   [place], [place] - [address] [phone] (Phone)  [phone] (Fax)  Comments:  patient would like to know if she needs to continue to get that one. Please advise Thanks

## 2020-07-28 ENCOUNTER — OFFICE VISIT (OUTPATIENT)
Dept: FAMILY MEDICINE | Facility: CLINIC | Age: 85
End: 2020-07-28
Payer: MEDICARE

## 2020-07-28 DIAGNOSIS — I50.32 CHRONIC DIASTOLIC HEART FAILURE: Chronic | ICD-10-CM

## 2020-07-28 DIAGNOSIS — I13.0 BENIGN HYPERTENSIVE HEART AND KIDNEY DISEASE WITH CHF, NYHA CLASS 1 AND CKD STAGE 3: Chronic | ICD-10-CM

## 2020-07-28 DIAGNOSIS — I48.20 CHRONIC ATRIAL FIBRILLATION: Chronic | ICD-10-CM

## 2020-07-28 DIAGNOSIS — N18.31 CKD STAGE G3A/A1, GFR 45-59 AND ALBUMIN CREATININE RATIO <30 MG/G: Chronic | ICD-10-CM

## 2020-07-28 DIAGNOSIS — F41.9 ANXIETY: Primary | ICD-10-CM

## 2020-07-28 DIAGNOSIS — E66.09 CLASS 1 OBESITY DUE TO EXCESS CALORIES WITH SERIOUS COMORBIDITY AND BODY MASS INDEX (BMI) OF 30.0 TO 30.9 IN ADULT: ICD-10-CM

## 2020-07-28 DIAGNOSIS — N18.30 BENIGN HYPERTENSIVE HEART AND KIDNEY DISEASE WITH CHF, NYHA CLASS 1 AND CKD STAGE 3: Chronic | ICD-10-CM

## 2020-07-28 DIAGNOSIS — Z13.820 SCREENING FOR OSTEOPOROSIS: ICD-10-CM

## 2020-07-28 DIAGNOSIS — E55.9 VITAMIN D INSUFFICIENCY: ICD-10-CM

## 2020-07-28 DIAGNOSIS — Z79.899 LONG TERM CURRENT USE OF DIURETIC: Chronic | ICD-10-CM

## 2020-07-28 DIAGNOSIS — Z79.01 LONG TERM CURRENT USE OF ANTICOAGULANT THERAPY: Chronic | ICD-10-CM

## 2020-07-28 DIAGNOSIS — D51.8 VITAMIN B12 DEFICIENCY (DIETARY) ANEMIA: ICD-10-CM

## 2020-07-28 DIAGNOSIS — M85.89 OSTEOPENIA OF MULTIPLE SITES: Chronic | ICD-10-CM

## 2020-07-28 PROCEDURE — 99214 OFFICE O/P EST MOD 30 MIN: CPT | Mod: S$GLB,,, | Performed by: INTERNAL MEDICINE

## 2020-07-28 PROCEDURE — 99999 PR PBB SHADOW E&M-EST. PATIENT-LVL V: CPT | Mod: PBBFAC,,, | Performed by: INTERNAL MEDICINE

## 2020-07-28 PROCEDURE — 99214 PR OFFICE/OUTPT VISIT, EST, LEVL IV, 30-39 MIN: ICD-10-PCS | Mod: S$GLB,,, | Performed by: INTERNAL MEDICINE

## 2020-07-28 PROCEDURE — 99999 PR PBB SHADOW E&M-EST. PATIENT-LVL V: ICD-10-PCS | Mod: PBBFAC,,, | Performed by: INTERNAL MEDICINE

## 2020-07-28 RX ORDER — BUSPIRONE HYDROCHLORIDE 5 MG/1
5 TABLET ORAL 2 TIMES DAILY
Qty: 180 TABLET | Refills: 1 | Status: SHIPPED | OUTPATIENT
Start: 2020-07-28 | End: 2021-02-14

## 2020-07-28 RX ORDER — CITALOPRAM 10 MG/1
10 TABLET ORAL NIGHTLY
Qty: 90 TABLET | Refills: 1 | Status: SHIPPED | OUTPATIENT
Start: 2020-07-28 | End: 2021-02-14

## 2020-07-28 NOTE — Clinical Note
Please schedule dexa on 9/18 when she is returning for labs. Advise her granddaughter ag. Call them.

## 2020-07-28 NOTE — PROGRESS NOTES
Subjective:      7/28/20    Patient ID: Miriam Oro is a 87 y.o. female.    Chief Complaint: Hypertension    HPI     Ms. Marcos is a 87F here for 1 month follow up after hospital d/c visit.     After our visit she followed up with her pain management physician who advised no further work up needed based on xrays obtained in our office. The pain eventually resolved and was felt to be muscular strain from when she had syncopal episode.     She feels much more energetic since starting the b12 injections and her mood is better with the celexa and buspar, which she is taking twice daily. Her granddaughter helps to organize her pill box.     She has established care with Dr. Levy and was very happy with her visit. He discontinued the aspirin and plavix for future use and started her on eliquis 2.5 mg BID. We discussed importance of compression stockings and low salt. Blood pressure has been well controlled- elevated on initial check, but did downtrend on repeat.    She saw GI on 7/10 with plan for capsule if she re-bleeds or anemia worsens.    She otherwise is doing very well!    Depression Patient Health Questionnaire 7/28/2020 6/18/2020 12/9/2019   Over the last two weeks how often have you been bothered by little interest or pleasure in doing things 0 0 0   Over the last two weeks how often have you been bothered by feeling down, depressed or hopeless 0 0 0   PHQ-2 Total Score 0 0 0       Review of patient's allergies indicates:   Allergen Reactions    Meloxicam Other (See Comments)     Decreases renal function    Nitrofurantoin monohyd/m-cryst Itching       Current Outpatient Medications:     alendronate (FOSAMAX) 70 MG tablet, TAKE ONE CAPSULE BY MOUTH ONCE PER WEEK AS DIRECTED, Disp: 12 tablet, Rfl: 0    apixaban (ELIQUIS) 2.5 mg Tab, Take 1 tablet (2.5 mg total) by mouth 2 (two) times daily., Disp: 60 tablet, Rfl: 3    busPIRone (BUSPAR) 5 MG Tab, Take 1 tablet (5 mg total) by mouth 2 (two) times daily.,  Disp: 180 tablet, Rfl: 1    carvediloL (COREG) 12.5 MG tablet, Take 1 tablet (12.5 mg total) by mouth 2 (two) times daily with meals., Disp: 180 tablet, Rfl: 0    cholecalciferol, vitamin D3, (VITAMIN D3) 50 mcg (2,000 unit) Cap, Take 1 capsule (2,000 Units total) by mouth once daily., Disp: 90 capsule, Rfl: 1    citalopram (CELEXA) 10 MG tablet, Take 1 tablet (10 mg total) by mouth every evening., Disp: 90 tablet, Rfl: 1    [START ON 8/6/2020] cyanocobalamin 1,000 mcg/mL injection, Inject 1 mL (1,000 mcg total) into the muscle every 30 days. for 2 doses, Disp: 2 mL, Rfl: 1    cyclobenzaprine (FLEXERIL) 10 MG tablet, Take 10 mg by mouth nightly. at bedtime., Disp: , Rfl: 1    ferrous sulfate (FEOSOL) 325 mg (65 mg iron) Tab tablet, Take 325 mg by mouth once daily., Disp: , Rfl:     FLOVENT  mcg/actuation inhaler, Inhale 1 puff into the lungs every 12 (twelve) hours. , Disp: , Rfl:     folic acid (FOLVITE) 1 MG tablet, Take 1,000 mcg by mouth once daily., Disp: , Rfl:     furosemide (LASIX) 20 MG tablet, Take 1 tablet (20 mg total) by mouth once daily., Disp: 90 tablet, Rfl: 1    gabapentin (NEURONTIN) 100 MG capsule, Take 200 mg by mouth 3 (three) times daily. , Disp: , Rfl: 1    HYDROcodone-acetaminophen (NORCO) 7.5-325 mg per tablet, Take 1 tablet by mouth every 6 (six) hours as needed for Pain., Disp: , Rfl:     insulin syringe-needle U-100 1 mL 31 gauge x 5/16 Syrg, Use as directed tid, Disp: 90 each, Rfl: 0    nystatin (MYCOSTATIN) powder, Apply 1 application topically 4 (four) times daily., Disp: , Rfl:     pantoprazole (PROTONIX) 40 MG tablet, TAKE ONE TABLET BY MOUTH EVERY DAY, Disp: 90 tablet, Rfl: 0    pravastatin (PRAVACHOL) 40 MG tablet, Take 1 tablet (40 mg total) by mouth nightly. at bedtime., Disp: 90 tablet, Rfl: 0    thiamine mononitrate, vit B1, (VITAMIN B-1, MONONITRATE,) 100 mg Tab, Take 100 mg by mouth once daily., Disp: , Rfl:     Past Medical History:   Diagnosis Date     Age-related osteoporosis without current pathological fracture 8/13/2018    Chronic atrial fibrillation 8/13/2018    Chronic diastolic heart failure 8/13/2018    Elevated brain natriuretic peptide (BNP) level 6/19/2020    GIB (gastrointestinal bleeding) 5/30/2020    Mixed hyperlipidemia 8/13/2018    Osteopenia of multiple sites 12/9/2019    Prediabetes 12/9/2019    RA (rheumatoid arthritis)     Tricuspid valve regurgitation 9/19/2018     Past Surgical History:   Procedure Laterality Date    TOTAL ABDOMINAL HYSTERECTOMY W/ BILATERAL SALPINGOOPHORECTOMY       Family History   Problem Relation Age of Onset    Aneurysm Mother      Social History     Socioeconomic History    Marital status:      Spouse name: Not on file    Number of children: Not on file    Years of education: Not on file    Highest education level: Not on file   Occupational History    Not on file   Social Needs    Financial resource strain: Not on file    Food insecurity     Worry: Not on file     Inability: Not on file    Transportation needs     Medical: Not on file     Non-medical: Not on file   Tobacco Use    Smoking status: Never Smoker    Smokeless tobacco: Never Used   Substance and Sexual Activity    Alcohol use: Not Currently    Drug use: Never    Sexual activity: Not Currently     Partners: Male     Birth control/protection: Surgical   Lifestyle    Physical activity     Days per week: Not on file     Minutes per session: Not on file    Stress: Not on file   Relationships    Social connections     Talks on phone: Not on file     Gets together: Not on file     Attends Shinto service: Not on file     Active member of club or organization: Not on file     Attends meetings of clubs or organizations: Not on file     Relationship status: Not on file   Other Topics Concern    Not on file   Social History Narrative    Not on file      Review of Systems   Constitutional: Negative for activity change, chills,  "fatigue, fever and unexpected weight change.   HENT: Negative for congestion.    Eyes: Negative for visual disturbance.   Respiratory: Negative for cough, shortness of breath and wheezing.    Cardiovascular: Positive for leg swelling (baseline). Negative for chest pain and palpitations.   Gastrointestinal: Negative for abdominal pain, blood in stool, diarrhea and nausea.   Endocrine: Negative for cold intolerance and heat intolerance.   Genitourinary: Negative for dysuria.   Musculoskeletal: Positive for arthralgias and back pain. Negative for gait problem and myalgias.        Chronic   Skin: Negative for rash.   Allergic/Immunologic: Negative for environmental allergies.   Neurological: Negative for dizziness, syncope, weakness, light-headedness, numbness (neuropathy- improved) and headaches.   Hematological: Does not bruise/bleed easily (previously with pradaxa).   Psychiatric/Behavioral: Negative for confusion, dysphoric mood, sleep disturbance and suicidal ideas. The patient is not nervous/anxious.          Objective:     Body mass index is 30.3 kg/m².  /84   Pulse 69   Temp 98.6 °F (37 °C)   Resp 16   Ht 4' 11" (1.499 m)   Wt 68 kg (150 lb)   BMI 30.30 kg/m²       Physical Exam  Vitals signs and nursing note reviewed.   Constitutional:       General: She is not in acute distress.     Appearance: Normal appearance. She is well-developed. She is not ill-appearing, toxic-appearing or diaphoretic.      Comments: Here with granddaughter. Looks much happier and animated than last visit.   HENT:      Head: Normocephalic and atraumatic.   Eyes:      Conjunctiva/sclera: Conjunctivae normal.   Cardiovascular:      Rate and Rhythm: Normal rate. Rhythm irregular.      Heart sounds: Normal heart sounds. No murmur.      Comments: Fib- irregularly irregular  Pulmonary:      Effort: Pulmonary effort is normal.      Breath sounds: Normal breath sounds.   Abdominal:      General: Bowel sounds are normal.      " Palpations: Abdomen is soft.      Tenderness: There is no abdominal tenderness.   Musculoskeletal:         General: No tenderness.      Right lower leg: Edema present.      Left lower leg: Edema present.      Comments: 2+ BLE edema to mid shin. Not wearing compression stockings.   Lymphadenopathy:      Cervical: No cervical adenopathy.   Skin:     General: Skin is warm and dry.      Capillary Refill: Capillary refill takes 2 to 3 seconds.   Neurological:      General: No focal deficit present.      Mental Status: She is alert and oriented to person, place, and time.      Sensory: No sensory deficit.      Motor: No weakness.      Coordination: Coordination normal (slightly unsteady on feet).      Gait: Gait normal.   Psychiatric:         Mood and Affect: Mood normal.         Behavior: Behavior normal.         Thought Content: Thought content normal.         Judgment: Judgment normal.      Comments: Happy, smiling         Lab Visit on 06/18/2020   Component Date Value Ref Range Status    WBC 06/18/2020 6.54  3.90 - 12.70 K/uL Final    RBC 06/18/2020 3.20* 4.00 - 5.40 M/uL Final    Hemoglobin 06/18/2020 10.4* 12.0 - 16.0 g/dL Final    Hematocrit 06/18/2020 33.5* 37.0 - 48.5 % Final    Mean Corpuscular Volume 06/18/2020 105* 82 - 98 fL Final    Mean Corpuscular Hemoglobin 06/18/2020 32.5* 27.0 - 31.0 pg Final    Mean Corpuscular Hemoglobin Conc 06/18/2020 31.0* 32.0 - 36.0 g/dL Final    RDW 06/18/2020 15.5* 11.5 - 14.5 % Final    Platelets 06/18/2020 244  150 - 350 K/uL Final    MPV 06/18/2020 10.5  9.2 - 12.9 fL Final    Immature Granulocytes 06/18/2020 0.3  0.0 - 0.5 % Final    Gran # (ANC) 06/18/2020 4.0  1.8 - 7.7 K/uL Final    Immature Grans (Abs) 06/18/2020 0.02  0.00 - 0.04 K/uL Final    Comment: Mild elevation in immature granulocytes is non specific and   can be seen in a variety of conditions including stress response,   acute inflammation, trauma and pregnancy. Correlation with other   laboratory  and clinical findings is essential.      Lymph # 06/18/2020 1.8  1.0 - 4.8 K/uL Final    Mono # 06/18/2020 0.5  0.3 - 1.0 K/uL Final    Eos # 06/18/2020 0.2  0.0 - 0.5 K/uL Final    Baso # 06/18/2020 0.02  0.00 - 0.20 K/uL Final    nRBC 06/18/2020 0  0 /100 WBC Final    Gran% 06/18/2020 61.2  38.0 - 73.0 % Final    Lymph% 06/18/2020 26.8  18.0 - 48.0 % Final    Mono% 06/18/2020 8.3  4.0 - 15.0 % Final    Eosinophil% 06/18/2020 3.1  0.0 - 8.0 % Final    Basophil% 06/18/2020 0.3  0.0 - 1.9 % Final    Differential Method 06/18/2020 Automated   Final    Sodium 06/18/2020 139  136 - 145 mmol/L Final    Potassium 06/18/2020 4.4  3.5 - 5.1 mmol/L Final    Chloride 06/18/2020 106  95 - 110 mmol/L Final    CO2 06/18/2020 25  23 - 29 mmol/L Final    Glucose 06/18/2020 103  70 - 110 mg/dL Final    BUN, Bld 06/18/2020 18  8 - 23 mg/dL Final    Creatinine 06/18/2020 1.2  0.5 - 1.4 mg/dL Final    Calcium 06/18/2020 8.9  8.7 - 10.5 mg/dL Final    Total Protein 06/18/2020 7.2  6.0 - 8.4 g/dL Final    Albumin 06/18/2020 3.8  3.5 - 5.2 g/dL Final    Total Bilirubin 06/18/2020 0.3  0.1 - 1.0 mg/dL Final    Comment: For infants and newborns, interpretation of results should be based  on gestational age, weight and in agreement with clinical  observations.  Premature Infant recommended reference ranges:  Up to 24 hours.............<8.0 mg/dL  Up to 48 hours............<12.0 mg/dL  3-5 days..................<15.0 mg/dL  6-29 days.................<15.0 mg/dL      Alkaline Phosphatase 06/18/2020 104  55 - 135 U/L Final    AST 06/18/2020 19  10 - 40 U/L Final    ALT 06/18/2020 10  10 - 44 U/L Final    Anion Gap 06/18/2020 8  8 - 16 mmol/L Final    eGFR if  06/18/2020 47.3* >60 mL/min/1.73 m^2 Final    eGFR if non African American 06/18/2020 41.0* >60 mL/min/1.73 m^2 Final    Comment: Calculation used to obtain the estimated glomerular filtration  rate (eGFR) is the CKD-EPI equation.       Iron  06/18/2020 64  30 - 160 ug/dL Final    Transferrin 06/18/2020 272  200 - 375 mg/dL Final    TIBC 06/18/2020 403  250 - 450 ug/dL Final    Saturated Iron 06/18/2020 16* 20 - 50 % Final    Ferritin 06/18/2020 59  20.0 - 300.0 ng/mL Final    TSH 06/18/2020 1.409  0.400 - 4.000 uIU/mL Final    Vit D, 25-Hydroxy 06/18/2020 24* 30 - 96 ng/mL Final    Comment: Vitamin D deficiency.........<10 ng/mL                              Vitamin D insufficiency......10-29 ng/mL       Vitamin D sufficiency........> or equal to 30 ng/mL  Vitamin D toxicity............>100 ng/mL      Magnesium 06/18/2020 2.2  1.6 - 2.6 mg/dL Final    Hemoglobin A1C 06/18/2020 5.4  4.0 - 5.6 % Final    Comment: ADA Screening Guidelines:  5.7-6.4%  Consistent with prediabetes  >or=6.5%  Consistent with diabetes  High levels of fetal hemoglobin interfere with the HbA1C  assay. Heterozygous hemoglobin variants (HbS, HgC, etc)do  not significantly interfere with this assay.   However, presence of multiple variants may affect accuracy.      Estimated Avg Glucose 06/18/2020 108  68 - 131 mg/dL Final    BNP 06/18/2020 280* 0 - 99 pg/mL Final    Values of less than 100 pg/ml are consistent with non-CHF populations.   Lab Visit on 06/18/2020   Component Date Value Ref Range Status    Microalbum.,U,Random 06/18/2020 18.0  ug/mL Final    Creatinine, Random Ur 06/18/2020 236.0  15.0 - 325.0 mg/dL Final    Comment: The random urine reference ranges provided were established   for 24 hour urine collections.  No reference ranges exist for  random urine specimens.  Correlate clinically.      Microalb Creat Ratio 06/18/2020 7.6  0.0 - 30.0 ug/mg Final     No results found in the last 24 hours.   Assessment:     Encounter Diagnoses   Name Primary?   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  -colonoscopy did not localize source, but suspected to be diverticular.    -Seen at [facility] on 7/10 with plan to do capsule if she re-bleeds or anemia worsens.  -CT abdomen pelvis from 05/30 showed nonobstructing right nephrolith and chronic L3 and left pubic rami fractures and degenerative changes of L4-L5.  -H/H on discharge 9.2/29.2.  Hugh 7.2/22.6.  MCV on discharge 01/04 0.7.  -6/18/20: cbc: 10.4/33.5/     #Anxiety, dysphoric mood- improved  -2/2 health issues & loss of   -started celexa 10 mg nightly + buspar BID, which has helped tremendously! Continue.    #Chronic Afib on NOAC, controlled  -established now with dr. Levy  -aspirin 81 mg daily & pradaxa d/c  -eliquis 2.5 mg BID started by dr. Levy  -coreg 12.5 mg BID continued.  Lab Results   Component Value Date    TSH 1.409 06/18/2020     #Osteopenia  -Previously osteoporosis in 1/2013.    -Dexa 8/29/18: osteopenia, however, considering h/o compression fracture opted to continue bisphosphonate therapy with fosamax  -Repeat dexa prior to next visit  -Continue Calcium and Vitamin D    #Vitamin D insufficiency  -6/18/20: vit D 24  -Vit D increased from 1K to 2K daily    #HFpEF  Euvolemic.  -Previously saw Dr. Atwood.   -Now established with dr. Levy (seen 6/29/20)  -Lasix 20 mg daily --> 2nd dose PRN  -TTE 6/1/20:  EF 55-60%, increased left atrial pressure and severe dilation of left and right atrium.  Moderate to severe tricuspid regurgitation, RVSP elevated at 50-60.     #HTN c/b CKD stage 3  -9/26/19: Cr 1.08, GFR 48, Urine protein/cr ratio 0.09, Uric  acid 5.9 iPTH 66, phos 3.6, mg 2.1   -Follows with Dr. Grant  -06/02/2020:  Phosphorus 2.8, Mag 2.2, GFR 47, creatinine 1.1  -continue coreg 12.5 mg BID    # hypokalemia  -potassium 3.1 on discharge.  Repeat.     #GERD  -Continue Protonix daily (9/26/19: Mg 2.1). repeat      #HLD  -Pravastatin 40 mg daily  Lab Results   Component Value Date    CHOL 146 12/09/2019    TRIG 92 12/09/2019    HDL 62 12/09/2019    LDLCALC 65.6 12/09/2019     #Prediabetes, resolved  -10/30/18: ha1c 5.8% --> 9/26/19: ha1c 6.3%  Lab Results   Component Value Date    HGBA1C 5.4 06/18/2020     #Obesity- BMI 30.09 --> 30.30  -She has gained about 5 pounds since last visit. Encouraged to eat healthy with more weight bearing exercises  Body mass index is 30.3 kg/m².  Wt Readings from Last 1 Encounters:   07/28/20 1151 68 kg (150 lb)     #B12 deficiency anemia with neuropathy  9/26/19: 12.4/38. .3  -Continue Iron daily (previous GI bleed)  -b12 164, folate 13.2 on 5/31/20  -started b12 injections- granddaughter will administer. #1, 6/18 then weekly x 3 doses then monthly x 2 doses then repeat levels.   Lab Results   Component Value Date    [id number] 258 12/09/2019     Lab Results   Component Value Date    FOLATE 10.5 12/09/2019     #Chronic Pain on opiate  -Sees pain management.  reviewed with last fill 7/2018 ( not syncing)  -Continue Norco and Gabapentin      #HCM  -TDAP 1/1/2005  -Prevnar 4/8/19. Pneumovax 10/30/2003  -Mammogram 2014. Birads 1.     Has mychart. Follow up in 3 months.      Randi Oro M.D.    Orders Placed This Encounter   Procedures    Vitamin D      Medications Ordered This Encounter   Medications    busPIRone (BUSPAR) 5 MG Tab     Sig: Take 1 tablet (5 mg total) by mouth 2 (two) times daily.     Dispense:  180 tablet     Refill:  1    citalopram (CELEXA) 10 MG tablet     Sig: Take 1 tablet (10 mg total) by mouth every evening.     Dispense:  90 tablet     Refill:  1

## 2020-08-02 VITALS
TEMPERATURE: 99 F | HEART RATE: 69 BPM | SYSTOLIC BLOOD PRESSURE: 136 MMHG | HEIGHT: 59 IN | DIASTOLIC BLOOD PRESSURE: 84 MMHG | BODY MASS INDEX: 30.24 KG/M2 | RESPIRATION RATE: 16 BRPM | WEIGHT: 150 LBS

## 2020-08-02 PROBLEM — D51.8 VITAMIN B12 DEFICIENCY (DIETARY) ANEMIA: Status: ACTIVE | Noted: 2019-12-09

## 2020-08-02 PROBLEM — Z79.891 LONG TERM CURRENT USE OF OPIATE ANALGESIC: Chronic | Status: ACTIVE | Noted: 2020-06-22

## 2020-08-02 PROBLEM — F41.9 ANXIETY: Chronic | Status: ACTIVE | Noted: 2020-06-22

## 2020-09-18 ENCOUNTER — LAB VISIT (OUTPATIENT)
Dept: LAB | Facility: HOSPITAL | Age: 85
End: 2020-09-18
Attending: INTERNAL MEDICINE
Payer: MEDICARE

## 2020-09-18 DIAGNOSIS — Z79.01 LONG TERM CURRENT USE OF ANTICOAGULANT THERAPY: Chronic | ICD-10-CM

## 2020-09-18 DIAGNOSIS — I50.32 CHRONIC DIASTOLIC HEART FAILURE: Chronic | ICD-10-CM

## 2020-09-18 DIAGNOSIS — G57.93 NEUROPATHY INVOLVING BOTH LOWER EXTREMITIES: ICD-10-CM

## 2020-09-18 DIAGNOSIS — Z79.899 LONG TERM CURRENT USE OF DIURETIC: Chronic | ICD-10-CM

## 2020-09-18 DIAGNOSIS — Z79.899 ENCOUNTER FOR LONG-TERM CURRENT USE OF MEDICATION: ICD-10-CM

## 2020-09-18 DIAGNOSIS — K92.2 GASTROINTESTINAL HEMORRHAGE, UNSPECIFIED GASTROINTESTINAL HEMORRHAGE TYPE: ICD-10-CM

## 2020-09-18 DIAGNOSIS — N18.30 STAGE 3 CHRONIC KIDNEY DISEASE: ICD-10-CM

## 2020-09-18 DIAGNOSIS — N18.31 CKD STAGE G3A/A1, GFR 45-59 AND ALBUMIN CREATININE RATIO <30 MG/G: Chronic | ICD-10-CM

## 2020-09-18 DIAGNOSIS — I36.1 NONRHEUMATIC TRICUSPID VALVE REGURGITATION: Chronic | ICD-10-CM

## 2020-09-18 DIAGNOSIS — I13.0 BENIGN HYPERTENSIVE HEART AND KIDNEY DISEASE WITH CHF, NYHA CLASS 1 AND CKD STAGE 3: Chronic | ICD-10-CM

## 2020-09-18 DIAGNOSIS — E55.9 VITAMIN D INSUFFICIENCY: ICD-10-CM

## 2020-09-18 DIAGNOSIS — N18.30 BENIGN HYPERTENSIVE HEART AND KIDNEY DISEASE WITH CHF, NYHA CLASS 1 AND CKD STAGE 3: Chronic | ICD-10-CM

## 2020-09-18 DIAGNOSIS — I11.0 BENIGN HYPERTENSIVE HEART DISEASE WITH HEART FAILURE: Chronic | ICD-10-CM

## 2020-09-18 DIAGNOSIS — M85.89 OSTEOPENIA OF MULTIPLE SITES: Chronic | ICD-10-CM

## 2020-09-18 DIAGNOSIS — D53.9 MACROCYTIC ANEMIA: ICD-10-CM

## 2020-09-18 DIAGNOSIS — R73.03 PREDIABETES: ICD-10-CM

## 2020-09-18 DIAGNOSIS — F41.9 ANXIETY: ICD-10-CM

## 2020-09-18 DIAGNOSIS — E78.2 MIXED HYPERLIPIDEMIA: Chronic | ICD-10-CM

## 2020-09-18 DIAGNOSIS — I48.20 CHRONIC ATRIAL FIBRILLATION: Chronic | ICD-10-CM

## 2020-09-18 DIAGNOSIS — K21.9 GASTROESOPHAGEAL REFLUX DISEASE WITHOUT ESOPHAGITIS: Chronic | ICD-10-CM

## 2020-09-18 LAB
25(OH)D3+25(OH)D2 SERPL-MCNC: 31 NG/ML (ref 30–96)
BASOPHILS # BLD AUTO: 0.01 K/UL (ref 0–0.2)
BASOPHILS NFR BLD: 0.1 % (ref 0–1.9)
DIFFERENTIAL METHOD: ABNORMAL
EOSINOPHIL # BLD AUTO: 0.1 K/UL (ref 0–0.5)
EOSINOPHIL NFR BLD: 0.7 % (ref 0–8)
ERYTHROCYTE [DISTWIDTH] IN BLOOD BY AUTOMATED COUNT: 14.2 % (ref 11.5–14.5)
HCT VFR BLD AUTO: 42.2 % (ref 37–48.5)
HGB BLD-MCNC: 13.2 G/DL (ref 12–16)
IMM GRANULOCYTES # BLD AUTO: 0.03 K/UL (ref 0–0.04)
IMM GRANULOCYTES NFR BLD AUTO: 0.4 % (ref 0–0.5)
LYMPHOCYTES # BLD AUTO: 1.6 K/UL (ref 1–4.8)
LYMPHOCYTES NFR BLD: 19.6 % (ref 18–48)
MCH RBC QN AUTO: 31.9 PG (ref 27–31)
MCHC RBC AUTO-ENTMCNC: 31.3 G/DL (ref 32–36)
MCV RBC AUTO: 102 FL (ref 82–98)
MONOCYTES # BLD AUTO: 0.6 K/UL (ref 0.3–1)
MONOCYTES NFR BLD: 7.6 % (ref 4–15)
NEUTROPHILS # BLD AUTO: 5.8 K/UL (ref 1.8–7.7)
NEUTROPHILS NFR BLD: 71.6 % (ref 38–73)
NRBC BLD-RTO: 0 /100 WBC
PLATELET # BLD AUTO: 161 K/UL (ref 150–350)
PMV BLD AUTO: 12.6 FL (ref 9.2–12.9)
RBC # BLD AUTO: 4.14 M/UL (ref 4–5.4)
VIT B12 SERPL-MCNC: 582 PG/ML (ref 210–950)
WBC # BLD AUTO: 8.16 K/UL (ref 3.9–12.7)

## 2020-09-18 PROCEDURE — 85025 COMPLETE CBC W/AUTO DIFF WBC: CPT

## 2020-09-18 PROCEDURE — 82607 VITAMIN B-12: CPT

## 2020-09-18 PROCEDURE — 83921 ORGANIC ACID SINGLE QUANT: CPT

## 2020-09-18 PROCEDURE — 82306 VITAMIN D 25 HYDROXY: CPT

## 2020-09-18 PROCEDURE — 36415 COLL VENOUS BLD VENIPUNCTURE: CPT | Mod: PO

## 2020-09-22 LAB — METHYLMALONATE SERPL-SCNC: 0.28 UMOL/L

## 2020-09-23 DIAGNOSIS — D75.89 MACROCYTOSIS WITHOUT ANEMIA: ICD-10-CM

## 2020-09-23 RX ORDER — LANOLIN ALCOHOL/MO/W.PET/CERES
1000 CREAM (GRAM) TOPICAL DAILY
Qty: 90 TABLET | Refills: 1 | Status: SHIPPED | OUTPATIENT
Start: 2020-09-23 | End: 2021-04-15 | Stop reason: SDUPTHER

## 2020-10-12 DIAGNOSIS — K92.2 GASTROINTESTINAL HEMORRHAGE, UNSPECIFIED GASTROINTESTINAL HEMORRHAGE TYPE: ICD-10-CM

## 2020-10-12 DIAGNOSIS — K21.9 GASTROESOPHAGEAL REFLUX DISEASE WITHOUT ESOPHAGITIS: Chronic | ICD-10-CM

## 2020-10-12 DIAGNOSIS — I48.20 CHRONIC ATRIAL FIBRILLATION: Chronic | ICD-10-CM

## 2020-10-12 DIAGNOSIS — I11.0 BENIGN HYPERTENSIVE HEART DISEASE WITH HEART FAILURE: Chronic | ICD-10-CM

## 2020-10-12 DIAGNOSIS — Z79.899 ENCOUNTER FOR LONG-TERM CURRENT USE OF MEDICATION: ICD-10-CM

## 2020-10-12 DIAGNOSIS — G57.93 NEUROPATHY INVOLVING BOTH LOWER EXTREMITIES: ICD-10-CM

## 2020-10-12 DIAGNOSIS — I13.0 BENIGN HYPERTENSIVE HEART AND KIDNEY DISEASE WITH CHF, NYHA CLASS 1 AND CKD STAGE 3: Chronic | ICD-10-CM

## 2020-10-12 DIAGNOSIS — E78.2 MIXED HYPERLIPIDEMIA: Chronic | ICD-10-CM

## 2020-10-12 DIAGNOSIS — N18.30 STAGE 3 CHRONIC KIDNEY DISEASE: ICD-10-CM

## 2020-10-12 DIAGNOSIS — D53.9 MACROCYTIC ANEMIA: ICD-10-CM

## 2020-10-12 DIAGNOSIS — N18.30 BENIGN HYPERTENSIVE HEART AND KIDNEY DISEASE WITH CHF, NYHA CLASS 1 AND CKD STAGE 3: Chronic | ICD-10-CM

## 2020-10-12 DIAGNOSIS — I50.32 CHRONIC DIASTOLIC HEART FAILURE: Chronic | ICD-10-CM

## 2020-10-12 DIAGNOSIS — Z79.899 LONG TERM CURRENT USE OF DIURETIC: Chronic | ICD-10-CM

## 2020-10-12 DIAGNOSIS — R73.03 PREDIABETES: ICD-10-CM

## 2020-10-12 DIAGNOSIS — Z79.01 LONG TERM CURRENT USE OF ANTICOAGULANT THERAPY: Chronic | ICD-10-CM

## 2020-10-12 DIAGNOSIS — I36.1 NONRHEUMATIC TRICUSPID VALVE REGURGITATION: Chronic | ICD-10-CM

## 2020-10-12 RX ORDER — FUROSEMIDE 20 MG/1
20 TABLET ORAL DAILY
Qty: 90 TABLET | Refills: 0 | Status: SHIPPED | OUTPATIENT
Start: 2020-10-12 | End: 2021-02-01

## 2020-10-12 NOTE — TELEPHONE ENCOUNTER
----- Message from Johanna Mi sent at 10/12/2020  2:08 PM CDT -----  Regarding: med refill  .Type:  RX Refill Request    Who Called:  pts grand daughter   Refill or New Rx:#refill   RX Name and Strength: fluid pills 20mg   How is the patient currently taking it? (ex. 1XDay):   Is this a 30 day or 90 day RX:   Preferred Pharmacy with phone number:       [facility] [place] - [address]  [address] [facility]  [place] [address]  Phone: [phone] Fax: [phone]      Local or Mail Order: local   Ordering Provider:   Would the patient rather a call back or a response via [facility]?call back   Best Call Back Number: [phone]    Additional Information:

## 2020-10-20 DIAGNOSIS — E78.2 MIXED HYPERLIPIDEMIA: Chronic | ICD-10-CM

## 2020-10-22 RX ORDER — PANTOPRAZOLE SODIUM 40 MG/1
TABLET, DELAYED RELEASE ORAL
Qty: 90 TABLET | Refills: 0 | OUTPATIENT
Start: 2020-10-22

## 2020-10-22 RX ORDER — PRAVASTATIN SODIUM 40 MG/1
TABLET ORAL
Qty: 90 TABLET | Refills: 0 | OUTPATIENT
Start: 2020-10-22

## 2020-10-28 ENCOUNTER — OFFICE VISIT (OUTPATIENT)
Dept: FAMILY MEDICINE | Facility: CLINIC | Age: 85
End: 2020-10-28
Payer: MEDICARE

## 2020-10-28 DIAGNOSIS — M85.89 OSTEOPENIA OF MULTIPLE SITES: Chronic | ICD-10-CM

## 2020-10-28 DIAGNOSIS — Z79.899 LONG TERM CURRENT USE OF DIURETIC: Chronic | ICD-10-CM

## 2020-10-28 DIAGNOSIS — D75.89 MACROCYTOSIS WITHOUT ANEMIA: ICD-10-CM

## 2020-10-28 DIAGNOSIS — Z13.29 SCREENING FOR THYROID DISORDER: ICD-10-CM

## 2020-10-28 DIAGNOSIS — F41.9 ANXIETY: Chronic | ICD-10-CM

## 2020-10-28 DIAGNOSIS — Z86.39 HISTORY OF VITAMIN D DEFICIENCY: ICD-10-CM

## 2020-10-28 DIAGNOSIS — Z86.39 HISTORY OF NON ANEMIC VITAMIN B12 DEFICIENCY: ICD-10-CM

## 2020-10-28 DIAGNOSIS — Z79.01 LONG TERM CURRENT USE OF ANTICOAGULANT THERAPY: Chronic | ICD-10-CM

## 2020-10-28 DIAGNOSIS — N18.31 CKD STAGE G3A/A1, GFR 45-59 AND ALBUMIN CREATININE RATIO <30 MG/G: Chronic | ICD-10-CM

## 2020-10-28 DIAGNOSIS — Z87.898 HISTORY OF PREDIABETES: ICD-10-CM

## 2020-10-28 DIAGNOSIS — Z13.21 ENCOUNTER FOR VITAMIN DEFICIENCY SCREENING: ICD-10-CM

## 2020-10-28 DIAGNOSIS — I13.0 BENIGN HYPERTENSIVE HEART AND KIDNEY DISEASE WITH CHF, NYHA CLASS 1 AND CKD STAGE 3: Primary | Chronic | ICD-10-CM

## 2020-10-28 DIAGNOSIS — I50.32 CHRONIC DIASTOLIC HEART FAILURE: Chronic | ICD-10-CM

## 2020-10-28 DIAGNOSIS — Z23 NEED FOR INFLUENZA VACCINATION: ICD-10-CM

## 2020-10-28 DIAGNOSIS — I48.20 CHRONIC ATRIAL FIBRILLATION: Chronic | ICD-10-CM

## 2020-10-28 DIAGNOSIS — Z13.0 SCREENING FOR DEFICIENCY ANEMIA: ICD-10-CM

## 2020-10-28 DIAGNOSIS — E78.2 MIXED HYPERLIPIDEMIA: Chronic | ICD-10-CM

## 2020-10-28 DIAGNOSIS — N18.30 BENIGN HYPERTENSIVE HEART AND KIDNEY DISEASE WITH CHF, NYHA CLASS 1 AND CKD STAGE 3: Primary | Chronic | ICD-10-CM

## 2020-10-28 DIAGNOSIS — Z79.891 LONG TERM CURRENT USE OF OPIATE ANALGESIC: Chronic | ICD-10-CM

## 2020-10-28 DIAGNOSIS — Z13.220 SCREENING FOR LIPID DISORDERS: ICD-10-CM

## 2020-10-28 DIAGNOSIS — Z13.1 SCREENING FOR DIABETES MELLITUS (DM): ICD-10-CM

## 2020-10-28 DIAGNOSIS — K21.9 GASTROESOPHAGEAL REFLUX DISEASE WITHOUT ESOPHAGITIS: Chronic | ICD-10-CM

## 2020-10-28 PROCEDURE — 99999 PR PBB SHADOW E&M-EST. PATIENT-LVL V: CPT | Mod: PBBFAC,,, | Performed by: INTERNAL MEDICINE

## 2020-10-28 PROCEDURE — 99999 PR PBB SHADOW E&M-EST. PATIENT-LVL V: ICD-10-PCS | Mod: PBBFAC,,, | Performed by: INTERNAL MEDICINE

## 2020-10-28 PROCEDURE — 90694 VACC AIIV4 NO PRSRV 0.5ML IM: CPT | Mod: S$GLB,,, | Performed by: INTERNAL MEDICINE

## 2020-10-28 PROCEDURE — 90694 FLU VACCINE - QUADRIVALENT - ADJUVANTED: ICD-10-PCS | Mod: S$GLB,,, | Performed by: INTERNAL MEDICINE

## 2020-10-28 PROCEDURE — 99214 OFFICE O/P EST MOD 30 MIN: CPT | Mod: 25,S$GLB,, | Performed by: INTERNAL MEDICINE

## 2020-10-28 PROCEDURE — G0008 FLU VACCINE - QUADRIVALENT - ADJUVANTED: ICD-10-PCS | Mod: S$GLB,,, | Performed by: INTERNAL MEDICINE

## 2020-10-28 PROCEDURE — 99214 PR OFFICE/OUTPT VISIT, EST, LEVL IV, 30-39 MIN: ICD-10-PCS | Mod: 25,S$GLB,, | Performed by: INTERNAL MEDICINE

## 2020-10-28 PROCEDURE — G0008 ADMIN INFLUENZA VIRUS VAC: HCPCS | Mod: S$GLB,,, | Performed by: INTERNAL MEDICINE

## 2020-10-28 NOTE — PROGRESS NOTES
Subjective:      Date: 10/28/20    Patient ID: Miriam Oro is a 87 y.o. female.    Chief Complaint: Hypertension (3 month follow up)    Ms. Marcos is a 87F here for 3 month follow up of chronic health conditions. The patient's last visit with me was on 7/28/2020.    She has bone density scheduled for Monday. She has been taking fosamax weekly and vitamin D 1K daily. She is not as active as previously.     She has appt with dr. ascencio in 3 weeks and pain management soon. No changes in medications. BP is elevated today- she did not take medications. She was unsure if she needed to fast.     Flu shot given today.     Fluid status in lower legs stable (not wearing compression stockings today). She is without complaints otherwise. Medications have refills. No further GIB.      Review of patient's allergies indicates:   Allergen Reactions    Meloxicam Other (See Comments)     Decreases renal function    Nitrofurantoin monohyd/m-cryst Itching       Current Outpatient Medications:     alendronate (FOSAMAX) 70 MG tablet, TAKE ONE TABLET BY MOUTH EVERY 7 DAYS WITH 8 ounces of WATER on empty stomach, Do not lie down for 30 MINUTES after, Disp: 12 tablet, Rfl: 0    busPIRone (BUSPAR) 5 MG Tab, Take 1 tablet (5 mg total) by mouth 2 (two) times daily., Disp: 180 tablet, Rfl: 1    carvediloL (COREG) 12.5 MG tablet, TAKE ONE TABLET BY MOUTH EVERY DAY, Disp: 90 tablet, Rfl: 1    cholecalciferol, vitamin D3, (VITAMIN D3) 50 mcg (2,000 unit) Cap, Take 1 capsule (2,000 Units total) by mouth once daily., Disp: 90 capsule, Rfl: 1    citalopram (CELEXA) 10 MG tablet, Take 1 tablet (10 mg total) by mouth every evening., Disp: 90 tablet, Rfl: 1    cyanocobalamin (VITAMIN B-12) 1000 MCG tablet, Take 1 tablet (1,000 mcg total) by mouth once daily. Injections no longer needed., Disp: 90 tablet, Rfl: 1    cyclobenzaprine (FLEXERIL) 10 MG tablet, Take 10 mg by mouth nightly. at bedtime., Disp: , Rfl: 1    ELIQUIS 2.5 mg Tab, TAKE ONE  TABLET BY MOUTH TWICE DAILY, Disp: 60 tablet, Rfl: 3    ferrous sulfate (FEOSOL) 325 mg (65 mg iron) Tab tablet, Take 325 mg by mouth once daily., Disp: , Rfl:     folic acid (FOLVITE) 1 MG tablet, Take 1,000 mcg by mouth once daily., Disp: , Rfl:     furosemide (LASIX) 20 MG tablet, Take 1 tablet (20 mg total) by mouth once daily., Disp: 90 tablet, Rfl: 0    gabapentin (NEURONTIN) 100 MG capsule, Take 200 mg by mouth 3 (three) times daily. , Disp: , Rfl: 1    HYDROcodone-acetaminophen (NORCO) 7.5-325 mg per tablet, Take 1 tablet by mouth every 6 (six) hours as needed for Pain., Disp: , Rfl:     nystatin (MYCOSTATIN) powder, Apply 1 application topically 4 (four) times daily., Disp: , Rfl:     pantoprazole (PROTONIX) 40 MG tablet, TAKE ONE TABLET BY MOUTH EVERY DAY, Disp: 90 tablet, Rfl: 0    pravastatin (PRAVACHOL) 40 MG tablet, TAKE ONE TABLET BY MOUTH NIGHTLY AT BEDTIME, Disp: 90 tablet, Rfl: 0    thiamine mononitrate, vit B1, (VITAMIN B-1, MONONITRATE,) 100 mg Tab, Take 100 mg by mouth once daily., Disp: , Rfl:     Past Medical History:   Diagnosis Date    Age-related osteoporosis without current pathological fracture 8/13/2018    Chronic atrial fibrillation 8/13/2018    Chronic diastolic heart failure 8/13/2018    Elevated brain natriuretic peptide (BNP) level 6/19/2020    GIB (gastrointestinal bleeding) 5/30/2020    Mixed hyperlipidemia 8/13/2018    Osteopenia of multiple sites 12/9/2019    Prediabetes 12/9/2019    RA (rheumatoid arthritis)     Tricuspid valve regurgitation 9/19/2018    Vitamin B12 deficiency (dietary) anemia 12/9/2019    Vitamin D insufficiency 12/9/2019     Past Surgical History:   Procedure Laterality Date    TOTAL ABDOMINAL HYSTERECTOMY W/ BILATERAL SALPINGOOPHORECTOMY       Family History   Problem Relation Age of Onset    Aneurysm Mother      Social History     Socioeconomic History    Marital status:      Spouse name: Not on file    Number of children:  Not on file    Years of education: Not on file    Highest education level: Not on file   Occupational History    Not on file   Social Needs    Financial resource strain: Not on file    Food insecurity     Worry: Not on file     Inability: Not on file    Transportation needs     Medical: Not on file     Non-medical: Not on file   Tobacco Use    Smoking status: Never Smoker    Smokeless tobacco: Never Used   Substance and Sexual Activity    Alcohol use: Not Currently    Drug use: Never    Sexual activity: Not Currently     Partners: Male     Birth control/protection: Surgical   Lifestyle    Physical activity     Days per week: Not on file     Minutes per session: Not on file    Stress: Not on file   Relationships    Social connections     Talks on phone: Not on file     Gets together: Not on file     Attends Anabaptism service: Not on file     Active member of club or organization: Not on file     Attends meetings of clubs or organizations: Not on file     Relationship status: Not on file   Other Topics Concern    Not on file   Social History Narrative    Not on file      Review of Systems   Constitutional: Negative for activity change, chills, fatigue, fever and unexpected weight change.   HENT: Negative for congestion.    Eyes: Negative for visual disturbance.   Respiratory: Negative for cough, shortness of breath and wheezing.    Cardiovascular: Positive for leg swelling (baseline). Negative for chest pain and palpitations.   Gastrointestinal: Negative for abdominal pain, blood in stool, diarrhea and nausea.   Endocrine: Negative for cold intolerance and heat intolerance.   Genitourinary: Negative for dysuria.   Musculoskeletal: Positive for arthralgias and back pain. Negative for gait problem and myalgias.        Chronic   Skin: Negative for rash.   Allergic/Immunologic: Negative for environmental allergies.   Neurological: Negative for dizziness, syncope, weakness, light-headedness, numbness  "(neuropathy- improved) and headaches.   Hematological: Does not bruise/bleed easily (previously with pradaxa).   Psychiatric/Behavioral: Negative for confusion, dysphoric mood, sleep disturbance and suicidal ideas. The patient is not nervous/anxious.          Objective:     Body mass index is 29.08 kg/m².  BP (!) 148/82 Comment: did not take medication this morning  Pulse 88   Temp 98.1 °F (36.7 °C)   Ht 4' 11" (1.499 m)   Wt 65.3 kg (144 lb)   SpO2 96%   BMI 29.08 kg/m²       Physical Exam  Vitals signs and nursing note reviewed.   Constitutional:       General: She is not in acute distress.     Appearance: Normal appearance. She is well-developed. She is not ill-appearing, toxic-appearing or diaphoretic.      Comments: Here with granddaughter.    HENT:      Head: Normocephalic and atraumatic.   Eyes:      Conjunctiva/sclera: Conjunctivae normal.   Cardiovascular:      Rate and Rhythm: Normal rate. Rhythm irregular.      Heart sounds: Normal heart sounds. No murmur.      Comments: Fib- irregularly irregular  Pulmonary:      Effort: Pulmonary effort is normal.      Breath sounds: Normal breath sounds.   Abdominal:      General: Bowel sounds are normal.      Palpations: Abdomen is soft.      Tenderness: There is no abdominal tenderness.   Musculoskeletal:         General: No tenderness.      Right lower leg: Edema present.      Left lower leg: Edema present.      Comments: 1-2+ BLE edema to mid shin. Not wearing compression stockings.   Lymphadenopathy:      Cervical: No cervical adenopathy.   Skin:     General: Skin is warm and dry.      Capillary Refill: Capillary refill takes 2 to 3 seconds.   Neurological:      General: No focal deficit present.      Mental Status: She is alert and oriented to person, place, and time.      Sensory: No sensory deficit.      Motor: No weakness.      Coordination: Coordination normal (slightly unsteady on feet).      Gait: Gait normal.   Psychiatric:         Mood and Affect: " Mood normal.         Behavior: Behavior normal.         Thought Content: Thought content normal.         Judgment: Judgment normal.      Comments: Happy, smiling         Lab Visit on 09/18/2020   Component Date Value Ref Range Status    Methlymalonic Acid 09/18/2020 0.28  <0.40 umol/L Final    Comment: If applicable, any drug confirmation testing reported  here was developed and the performance characteristics  determined by [facility] [place] [facility]. This   confirmation testing has not been cleared or approved  by the FDA. The laboratory is regulated under CLIA as  qualified to perform high-complexity testing. This test  is used for patient testing purposes. It should not be  regarded as investigational or for research.  Test performed at [facility] [place],  [address] , [place], [place]  [id number] [phone]  Nick Laurent MD  - Medical Director      Vitamin B-12 09/18/2020 582  210 - 950 pg/mL Final    WBC 09/18/2020 8.16  3.90 - 12.70 K/uL Final    RBC 09/18/2020 4.14  4.00 - 5.40 M/uL Final    Hemoglobin 09/18/2020 13.2  12.0 - 16.0 g/dL Final    Hematocrit 09/18/2020 42.2  37.0 - 48.5 % Final    MCV 09/18/2020 102* 82 - 98 fL Final    MCH 09/18/2020 31.9* 27.0 - 31.0 pg Final    MCHC 09/18/2020 31.3* 32.0 - 36.0 g/dL Final    RDW 09/18/2020 14.2  11.5 - 14.5 % Final    Platelets 09/18/2020 161  150 - 350 K/uL Final    MPV 09/18/2020 12.6  9.2 - 12.9 fL Final    Immature Granulocytes 09/18/2020 0.4  0.0 - 0.5 % Final    Gran # (ANC) 09/18/2020 5.8  1.8 - 7.7 K/uL Final    Immature Grans (Abs) 09/18/2020 0.03  0.00 - 0.04 K/uL Final    Comment: Mild elevation in immature granulocytes is non specific and   can be seen in a variety of conditions including stress response,   acute inflammation, trauma and pregnancy. Correlation with other   laboratory and clinical findings is essential.      Lymph # 09/18/2020 1.6  1.0 - 4.8 K/uL Final    Mono # 09/18/2020 0.6  0.3 - 1.0 K/uL Final     Eos # 09/18/2020 0.1  0.0 - 0.5 K/uL Final    Baso # 09/18/2020 0.01  0.00 - 0.20 K/uL Final    nRBC 09/18/2020 0  0 /100 WBC Final    Gran % 09/18/2020 71.6  38.0 - 73.0 % Final    Lymph % 09/18/2020 19.6  18.0 - 48.0 % Final    Mono % 09/18/2020 7.6  4.0 - 15.0 % Final    Eosinophil % 09/18/2020 0.7  0.0 - 8.0 % Final    Basophil % 09/18/2020 0.1  0.0 - 1.9 % Final    Differential Method 09/18/2020 Automated   Final    Vit D, 25-Hydroxy 09/18/2020 31  30 - 96 ng/mL Final    Comment: Vitamin D deficiency.........<10 ng/mL                              Vitamin D insufficiency......10-29 ng/mL       Vitamin D sufficiency........> or equal to 30 ng/mL  Vitamin D toxicity............>100 ng/mL     Lab Visit on 06/18/2020   Component Date Value Ref Range Status    WBC 06/18/2020 6.54  3.90 - 12.70 K/uL Final    RBC 06/18/2020 3.20* 4.00 - 5.40 M/uL Final    Hemoglobin 06/18/2020 10.4* 12.0 - 16.0 g/dL Final    Hematocrit 06/18/2020 33.5* 37.0 - 48.5 % Final    MCV 06/18/2020 105* 82 - 98 fL Final    MCH 06/18/2020 32.5* 27.0 - 31.0 pg Final    MCHC 06/18/2020 31.0* 32.0 - 36.0 g/dL Final    RDW 06/18/2020 15.5* 11.5 - 14.5 % Final    Platelets 06/18/2020 244  150 - 350 K/uL Final    MPV 06/18/2020 10.5  9.2 - 12.9 fL Final    Immature Granulocytes 06/18/2020 0.3  0.0 - 0.5 % Final    Gran # (ANC) 06/18/2020 4.0  1.8 - 7.7 K/uL Final    Immature Grans (Abs) 06/18/2020 0.02  0.00 - 0.04 K/uL Final    Comment: Mild elevation in immature granulocytes is non specific and   can be seen in a variety of conditions including stress response,   acute inflammation, trauma and pregnancy. Correlation with other   laboratory and clinical findings is essential.      Lymph # 06/18/2020 1.8  1.0 - 4.8 K/uL Final    Mono # 06/18/2020 0.5  0.3 - 1.0 K/uL Final    Eos # 06/18/2020 0.2  0.0 - 0.5 K/uL Final    Baso # 06/18/2020 0.02  0.00 - 0.20 K/uL Final    nRBC 06/18/2020 0  0 /100 WBC Final    Gran %  06/18/2020 61.2  38.0 - 73.0 % Final    Lymph % 06/18/2020 26.8  18.0 - 48.0 % Final    Mono % 06/18/2020 8.3  4.0 - 15.0 % Final    Eosinophil % 06/18/2020 3.1  0.0 - 8.0 % Final    Basophil % 06/18/2020 0.3  0.0 - 1.9 % Final    Differential Method 06/18/2020 Automated   Final    Sodium 06/18/2020 139  136 - 145 mmol/L Final    Potassium 06/18/2020 4.4  3.5 - 5.1 mmol/L Final    Chloride 06/18/2020 106  95 - 110 mmol/L Final    CO2 06/18/2020 25  23 - 29 mmol/L Final    Glucose 06/18/2020 103  70 - 110 mg/dL Final    BUN 06/18/2020 18  8 - 23 mg/dL Final    Creatinine 06/18/2020 1.2  0.5 - 1.4 mg/dL Final    Calcium 06/18/2020 8.9  8.7 - 10.5 mg/dL Final    Total Protein 06/18/2020 7.2  6.0 - 8.4 g/dL Final    Albumin 06/18/2020 3.8  3.5 - 5.2 g/dL Final    Total Bilirubin 06/18/2020 0.3  0.1 - 1.0 mg/dL Final    Comment: For infants and newborns, interpretation of results should be based  on gestational age, weight and in agreement with clinical  observations.  Premature Infant recommended reference ranges:  Up to 24 hours.............<8.0 mg/dL  Up to 48 hours............<12.0 mg/dL  3-5 days..................<15.0 mg/dL  6-29 days.................<15.0 mg/dL      Alkaline Phosphatase 06/18/2020 104  55 - 135 U/L Final    AST 06/18/2020 19  10 - 40 U/L Final    ALT 06/18/2020 10  10 - 44 U/L Final    Anion Gap 06/18/2020 8  8 - 16 mmol/L Final    eGFR if  06/18/2020 47.3* >60 mL/min/1.73 m^2 Final    eGFR if non African American 06/18/2020 41.0* >60 mL/min/1.73 m^2 Final    Comment: Calculation used to obtain the estimated glomerular filtration  rate (eGFR) is the CKD-EPI equation.       Iron 06/18/2020 64  30 - 160 ug/dL Final    Transferrin 06/18/2020 272  200 - 375 mg/dL Final    TIBC 06/18/2020 403  250 - 450 ug/dL Final    Saturated Iron 06/18/2020 16* 20 - 50 % Final    Ferritin 06/18/2020 59  20.0 - 300.0 ng/mL Final    TSH 06/18/2020 1.409  0.400 - 4.000 uIU/mL  Final    Vit D, 25-Hydroxy 06/18/2020 24* 30 - 96 ng/mL Final    Comment: Vitamin D deficiency.........<10 ng/mL                              Vitamin D insufficiency......10-29 ng/mL       Vitamin D sufficiency........> or equal to 30 ng/mL  Vitamin D toxicity............>100 ng/mL      Magnesium 06/18/2020 2.2  1.6 - 2.6 mg/dL Final    Hemoglobin A1C 06/18/2020 5.4  4.0 - 5.6 % Final    Comment: ADA Screening Guidelines:  5.7-6.4%  Consistent with prediabetes  >or=6.5%  Consistent with diabetes  High levels of fetal hemoglobin interfere with the HbA1C  assay. Heterozygous hemoglobin variants (HbS, HgC, etc)do  not significantly interfere with this assay.   However, presence of multiple variants may affect accuracy.      Estimated Avg Glucose 06/18/2020 108  68 - 131 mg/dL Final    BNP 06/18/2020 280* 0 - 99 pg/mL Final    Values of less than 100 pg/ml are consistent with non-CHF populations.   Lab Visit on 06/18/2020   Component Date Value Ref Range Status    Microalbumin, Urine 06/18/2020 18.0  ug/mL Final    Creatinine, Urine 06/18/2020 236.0  15.0 - 325.0 mg/dL Final    Comment: The random urine reference ranges provided were established   for 24 hour urine collections.  No reference ranges exist for  random urine specimens.  Correlate clinically.      Microalb/Creat Ratio 06/18/2020 7.6  0.0 - 30.0 ug/mg Final     No results found in the last 24 hours.   Assessment:     Encounter Diagnoses   Name Primary?   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, NYHA class 1 and CKD stage 3 Yes    Need for influenza vaccination     Osteopenia of multiple sites     BMI 29.0-29.9,adult     Macrocytosis without anemia     CKD stage G3a/A1, GFR 45-59 and albumin creatinine ratio <30 mg/g     Long term current use of anticoagulant therapy     Long term current use of opiate analgesic     Anxiety     Chronic atrial fibrillation     Chronic diastolic heart failure     Mixed hyperlipidemia      Gastroesophageal reflux disease without esophagitis     Long term current use of diuretic     History of prediabetes     History of vitamin D deficiency     History of non anemic vitamin B12 deficiency     Screening for deficiency anemia     Encounter for vitamin deficiency screening     Screening for thyroid disorder     Screening for lipid disorders     Screening for diabetes mellitus (DM)         Plan:     # GI bleed, resolved  -likely 2/2 pradaxa use & diverticular bleed. Anemia resolved s/p hospitalization. Underwent colonoscopy & follows with Rylee. Plans to do capsule if she re-bleeds or anemia worsens.  -H/H d/c: 9.2/29.2. Hugh 7.2/22.6. --> 6/18/20: cbc: 10.4/33.5/     #Anxiety, dysphoric mood- improved  -2/2 health issues & loss of   -continue celexa 10 mg nightly + buspar BID- working well    #Chronic Afib on NOAC, controlled  -established now with dr. Levy  -continue aspirin 81 mg daily + -coreg 12.5 mg BID   -pradaxa d/c & eliquis 2.5 mg BID started by dr. Levy    Lab Results   Component Value Date    TSH 1.409 06/18/2020     #Osteopenia  -Previously osteoporosis in 1/2013.    -Dexa 8/29/18: osteopenia, however, considering h/o compression fracture opted to continue bisphosphonate therapy with fosamax  -Repeat dexa scheduled for Mondy  -Continue Calcium and Vitamin D    #Vitamin D insufficiency  -6/18/20: vit D 24  -Vit D increased from 1K to 2K daily  Lab Results   Component Value Date    [id number] 31 09/18/2020    [id number] 24 (L) 06/18/2020    [id number] 26 (L) 12/09/2019     #HFpEF  Euvolemic.  -Previously saw Dr. Atwood.   -Now established with dr. Levy (seen 6/29/20)  -Lasix 20 mg daily --> 2nd dose PRN  -TTE 6/1/20:  EF 55-60%, increased LA pressure and severe dilation of LA & RA.  Moderate to severe TR, RVSP elevated at 50-60.     #HTN c/b CKD stage 3  -9/26/19: Cr 1.08, GFR 48, Urine protein/cr ratio 0.09, Uric acid 5.9 iPTH 66, phos 3.6, mg 2.1    -Follows with Dr. Grant  -06/02/2020:  Phosphorus 2.8, Mag 2.2, GFR 47, creatinine 1.1  -continue coreg 12.5 mg BID  Lab Results   Component Value Date    CREATININE 1.2 06/18/2020    CREATININE 1.0 12/09/2019     Lab Results   Component Value Date    EGFRNONAA 41.0 (A) 06/18/2020    EGFRNONAA 51.1 (A) 12/09/2019     Lab Results   Component Value Date    MICALBCREAT 7.6 06/18/2020     #GERD-controlled  -Continue Protonix daily (9/26/19: Mg 2.1).      #HLD  -Pravastatin 40 mg daily  Lab Results   Component Value Date    CHOL 146 12/09/2019   ,   Lab Results   Component Value Date    HDL 62 12/09/2019   ,   Lab Results   Component Value Date    LDLCALC 65.6 12/09/2019   ,   Lab Results   Component Value Date    TRIG 92 12/09/2019     Lab Results   Component Value Date    CHOL 146 12/09/2019    TRIG 92 12/09/2019    HDL 62 12/09/2019    LDLCALC 65.6 12/09/2019     #Prediabetes, resolved  -10/30/18: ha1c 5.8% --> 9/26/19: ha1c 6.3%  Lab Results   Component Value Date    HGBA1C 5.4 06/18/2020    HGBA1C 5.7 (H) 12/09/2019     #Obesity- BMI 30.09 --> 29.08  -congratulated on weight loss. Encouraged to continue to eat healthy with more weight bearing exercises  BMI Readings from Last 3 Encounters:   10/28/20 29.08 kg/m²   07/28/20 30.30 kg/m²   06/29/20 30.09 kg/m²     Wt Readings from Last 3 Encounters:   10/28/20 65.3 kg (144 lb)   07/28/20 68 kg (150 lb)   06/29/20 67.6 kg (149 lb)     #B12 deficiency anemia with neuropathy- resolved  #macrocytosis without anemia  9/26/19: 12.4/38. .3  -Continue Iron daily (previous GI bleed)  -b12 164, folate 13.2 on 5/31/20  -completed b12 injection series starting 6/18/20  -9/18/20: b12 deficiency resolved. Continue oral b12. Repeat labs in 3 months with homocysteine and folic acid.     Lab Results   Component Value Date    WBC 8.16 09/18/2020    HGB 13.2 09/18/2020    HCT 42.2 09/18/2020     (H) 09/18/2020     09/18/2020     Lab Results   Component Value Date     OBOKMFEK19 582 09/18/2020    YQZOLIPE18 258 12/09/2019     Lab Results   Component Value Date    METHLYMALONI 0.28 09/18/2020     Lab Results   Component Value Date    FOLATE 10.5 12/09/2019     #Chronic Pain on opiate  -Sees pain management.  reviewed with last fill 7/2018 ( not syncing)  -Continue Norco and Gabapentin      #HCM  -Influenza 10/28/20, today  -TDAP 1/1/2005  -Prevnar 4/8/19. Pneumovax 10/30/03  -Mammogram 2014. Birads 1.     Has mychart. Follow up in 3 months with labs 1 week prior. (will combine with dr. ascencio's labs)     Randi Oro M.D.    Orders Placed This Encounter   Procedures    Influenza - Quadrivalent (Adjuvanted)    Hemoglobin A1C    TSH    Magnesium    Vitamin D    Vitamin B12    Folate    Methylmalonic Acid, Serum    Homocysteine, Serum    CBC Auto Differential    Comprehensive Metabolic Panel    Iron and TIBC    Ferritin    Zinc    Lipid Panel    BNP    PTH, Intact

## 2020-10-29 DIAGNOSIS — K21.9 GASTROESOPHAGEAL REFLUX DISEASE WITHOUT ESOPHAGITIS: Chronic | ICD-10-CM

## 2020-10-29 DIAGNOSIS — I48.20 CHRONIC ATRIAL FIBRILLATION: Chronic | ICD-10-CM

## 2020-10-29 DIAGNOSIS — I36.1 NONRHEUMATIC TRICUSPID VALVE REGURGITATION: Chronic | ICD-10-CM

## 2020-10-29 DIAGNOSIS — M25.471 PAIN AND SWELLING OF RIGHT ANKLE: ICD-10-CM

## 2020-10-29 DIAGNOSIS — I11.0 BENIGN HYPERTENSIVE HEART DISEASE WITH HEART FAILURE: Chronic | ICD-10-CM

## 2020-10-29 DIAGNOSIS — E78.2 MIXED HYPERLIPIDEMIA: Chronic | ICD-10-CM

## 2020-10-29 DIAGNOSIS — N18.30 STAGE 3 CHRONIC KIDNEY DISEASE: ICD-10-CM

## 2020-10-29 DIAGNOSIS — M25.571 PAIN AND SWELLING OF RIGHT ANKLE: ICD-10-CM

## 2020-10-29 DIAGNOSIS — D53.9 MACROCYTIC ANEMIA: ICD-10-CM

## 2020-10-29 DIAGNOSIS — R73.03 PREDIABETES: ICD-10-CM

## 2020-10-29 DIAGNOSIS — I50.32 CHRONIC DIASTOLIC HEART FAILURE: Chronic | ICD-10-CM

## 2020-10-29 DIAGNOSIS — Z79.899 ENCOUNTER FOR LONG-TERM CURRENT USE OF MEDICATION: ICD-10-CM

## 2020-10-29 DIAGNOSIS — G57.93 NEUROPATHY INVOLVING BOTH LOWER EXTREMITIES: ICD-10-CM

## 2020-10-29 RX ORDER — ALENDRONATE SODIUM 70 MG/1
TABLET ORAL
Qty: 12 TABLET | Refills: 0 | Status: SHIPPED | OUTPATIENT
Start: 2020-10-29 | End: 2021-01-18

## 2020-11-02 ENCOUNTER — HOSPITAL ENCOUNTER (OUTPATIENT)
Dept: RADIOLOGY | Facility: HOSPITAL | Age: 85
Discharge: HOME | End: 2020-11-02
Attending: INTERNAL MEDICINE
Payer: MEDICARE

## 2020-11-02 VITALS
BODY MASS INDEX: 29.03 KG/M2 | DIASTOLIC BLOOD PRESSURE: 82 MMHG | HEIGHT: 59 IN | OXYGEN SATURATION: 96 % | HEART RATE: 88 BPM | TEMPERATURE: 98 F | WEIGHT: 144 LBS | SYSTOLIC BLOOD PRESSURE: 148 MMHG

## 2020-11-02 DIAGNOSIS — M85.89 OSTEOPENIA OF MULTIPLE SITES: ICD-10-CM

## 2020-11-02 DIAGNOSIS — Z13.820 SCREENING FOR OSTEOPOROSIS: ICD-10-CM

## 2020-11-02 PROCEDURE — 77080 DXA BONE DENSITY AXIAL: CPT | Mod: TC,PO

## 2020-11-02 PROCEDURE — 77080 DXA BONE DENSITY AXIAL: CPT | Mod: 26,,, | Performed by: RADIOLOGY

## 2020-11-02 PROCEDURE — 77080 DEXA BONE DENSITY SPINE HIP: ICD-10-PCS | Mod: 26,,, | Performed by: RADIOLOGY

## 2020-11-04 ENCOUNTER — TELEPHONE (OUTPATIENT)
Dept: FAMILY MEDICINE | Facility: CLINIC | Age: 85
End: 2020-11-04

## 2020-11-04 NOTE — TELEPHONE ENCOUNTER
Auth Prov: Randi Oro           [facility]  Imaging Result     Name:     (Age):  Sex:  Patient Class:  Miriam Oro  1933 (87 year old)  Female  Outpatient  Accession Number:  Exam Time:  MRN:     [id number]  2018 10:25 AM  [id number]     Reason for Exam:  None Specified  Diagnosis:  Other osteoporosis without current pathological fracture [M81.8 (ICD-10-CM)]  Heart failure, unspecified HF chronicity, unspecified heart failure type (HCC) [I50.9 (ICD-10-CM)]  Procedures Performed:  Dxa Bone Density Spine And Hip                 REASON FOR EXAM: [M81.8]-Other osteoporosis without current pathological   fracture / [I50.9]-Heart failure, unspecified       TECHNICAL FACTORS: Readings are obtained over the lumbar spine, left   forearm and left hip using a [employer] C scanner, software version   12.6, 0.2.     COMPARISON:      LUMBAR SPINE FINDINGS: Bone mineral density from L1 to L4 is 0.820 g/sq   cm. The T score is -2.1. There is no data for age matched controls. There   is interval improvement however this is favored to be artifactually   elevated secondary to degenerative change. Therefore the forearm was   included     HIP FINDINGS: Bone mineral density over the neck of the femur is 0.606   g/sq cm. The T score is -2.2. There is no data for age matched controls.   There is interval worsening of approximately 10.6%.     FOREARM FINDINGS: Bone mineral density over the1/3 radius is 0.616 g/sq   cm. The T score is -1.3. There is no data for age matched controls. There   is no comparison for the forearm.        IMPRESSION:  Low bone mass (osteopenia) as defined by WHO criteria.           Approved by SELENA Valdovinos on 2018 12:24 PM     Electronically signed by Carter Winters MD on 2018 3:30 PM

## 2020-11-04 NOTE — TELEPHONE ENCOUNTER
The lumbar spine has worsened somewhat but the hip osteoporosis has stayed about the same.  As I have stated before, due to age, I don't think I would recommend a change in the medicine at this point.  She can discuss this with Dr. Oro upon her return for another opinion on this but I would not make a change.

## 2020-12-29 ENCOUNTER — LAB VISIT (OUTPATIENT)
Dept: LAB | Facility: HOSPITAL | Age: 85
End: 2020-12-29
Attending: INTERNAL MEDICINE
Payer: MEDICARE

## 2020-12-29 DIAGNOSIS — Z13.0 SCREENING FOR DEFICIENCY ANEMIA: ICD-10-CM

## 2020-12-29 DIAGNOSIS — F41.9 ANXIETY: Chronic | ICD-10-CM

## 2020-12-29 DIAGNOSIS — N18.31 CKD STAGE G3A/A1, GFR 45-59 AND ALBUMIN CREATININE RATIO <30 MG/G: Chronic | ICD-10-CM

## 2020-12-29 DIAGNOSIS — Z87.898 HISTORY OF PREDIABETES: ICD-10-CM

## 2020-12-29 DIAGNOSIS — Z79.01 LONG TERM CURRENT USE OF ANTICOAGULANT THERAPY: Chronic | ICD-10-CM

## 2020-12-29 DIAGNOSIS — Z86.39 HISTORY OF NON ANEMIC VITAMIN B12 DEFICIENCY: ICD-10-CM

## 2020-12-29 DIAGNOSIS — E78.2 MIXED HYPERLIPIDEMIA: Chronic | ICD-10-CM

## 2020-12-29 DIAGNOSIS — I13.0 BENIGN HYPERTENSIVE HEART AND KIDNEY DISEASE WITH CHF, NYHA CLASS 1 AND CKD STAGE 3: Chronic | ICD-10-CM

## 2020-12-29 DIAGNOSIS — Z23 NEED FOR INFLUENZA VACCINATION: ICD-10-CM

## 2020-12-29 DIAGNOSIS — Z13.29 SCREENING FOR THYROID DISORDER: ICD-10-CM

## 2020-12-29 DIAGNOSIS — I50.32 CHRONIC DIASTOLIC HEART FAILURE: Chronic | ICD-10-CM

## 2020-12-29 DIAGNOSIS — M85.89 OSTEOPENIA OF MULTIPLE SITES: Chronic | ICD-10-CM

## 2020-12-29 DIAGNOSIS — I48.20 CHRONIC ATRIAL FIBRILLATION: Chronic | ICD-10-CM

## 2020-12-29 DIAGNOSIS — Z13.21 ENCOUNTER FOR VITAMIN DEFICIENCY SCREENING: ICD-10-CM

## 2020-12-29 DIAGNOSIS — Z13.220 SCREENING FOR LIPID DISORDERS: ICD-10-CM

## 2020-12-29 DIAGNOSIS — Z79.891 LONG TERM CURRENT USE OF OPIATE ANALGESIC: Chronic | ICD-10-CM

## 2020-12-29 DIAGNOSIS — Z13.1 SCREENING FOR DIABETES MELLITUS (DM): ICD-10-CM

## 2020-12-29 DIAGNOSIS — Z86.39 HISTORY OF VITAMIN D DEFICIENCY: ICD-10-CM

## 2020-12-29 DIAGNOSIS — D75.89 MACROCYTOSIS WITHOUT ANEMIA: ICD-10-CM

## 2020-12-29 DIAGNOSIS — K21.9 GASTROESOPHAGEAL REFLUX DISEASE WITHOUT ESOPHAGITIS: Chronic | ICD-10-CM

## 2020-12-29 DIAGNOSIS — Z79.899 LONG TERM CURRENT USE OF DIURETIC: Chronic | ICD-10-CM

## 2020-12-29 DIAGNOSIS — N18.30 BENIGN HYPERTENSIVE HEART AND KIDNEY DISEASE WITH CHF, NYHA CLASS 1 AND CKD STAGE 3: Chronic | ICD-10-CM

## 2020-12-29 LAB
25(OH)D3+25(OH)D2 SERPL-MCNC: 35 NG/ML (ref 30–96)
ALBUMIN SERPL BCP-MCNC: 3.8 G/DL (ref 3.5–5.2)
ALP SERPL-CCNC: 73 U/L (ref 55–135)
ALT SERPL W/O P-5'-P-CCNC: 10 U/L (ref 10–44)
ANION GAP SERPL CALC-SCNC: 10 MMOL/L (ref 8–16)
AST SERPL-CCNC: 16 U/L (ref 10–40)
BASOPHILS # BLD AUTO: 0.01 K/UL (ref 0–0.2)
BASOPHILS NFR BLD: 0.2 % (ref 0–1.9)
BILIRUB SERPL-MCNC: 0.6 MG/DL (ref 0.1–1)
BNP SERPL-MCNC: 432 PG/ML (ref 0–99)
BUN SERPL-MCNC: 17 MG/DL (ref 8–23)
CALCIUM SERPL-MCNC: 9.1 MG/DL (ref 8.7–10.5)
CHLORIDE SERPL-SCNC: 104 MMOL/L (ref 95–110)
CHOLEST SERPL-MCNC: 179 MG/DL (ref 120–199)
CHOLEST/HDLC SERPL: 2.8 {RATIO} (ref 2–5)
CO2 SERPL-SCNC: 27 MMOL/L (ref 23–29)
CREAT SERPL-MCNC: 1 MG/DL (ref 0.5–1.4)
DIFFERENTIAL METHOD: ABNORMAL
EOSINOPHIL # BLD AUTO: 0.1 K/UL (ref 0–0.5)
EOSINOPHIL NFR BLD: 1.6 % (ref 0–8)
ERYTHROCYTE [DISTWIDTH] IN BLOOD BY AUTOMATED COUNT: 14.1 % (ref 11.5–14.5)
EST. GFR  (AFRICAN AMERICAN): 58.5 ML/MIN/1.73 M^2
EST. GFR  (NON AFRICAN AMERICAN): 50.8 ML/MIN/1.73 M^2
ESTIMATED AVG GLUCOSE: 120 MG/DL (ref 68–131)
FERRITIN SERPL-MCNC: 103 NG/ML (ref 20–300)
FOLATE SERPL-MCNC: 16 NG/ML (ref 4–24)
GLUCOSE SERPL-MCNC: 94 MG/DL (ref 70–110)
HBA1C MFR BLD HPLC: 5.8 % (ref 4–5.6)
HCT VFR BLD AUTO: 37.7 % (ref 37–48.5)
HCYS SERPL-SCNC: 9.7 UMOL/L (ref 4–15.5)
HDLC SERPL-MCNC: 64 MG/DL (ref 40–75)
HDLC SERPL: 35.8 % (ref 20–50)
HGB BLD-MCNC: 11.8 G/DL (ref 12–16)
IMM GRANULOCYTES # BLD AUTO: 0.02 K/UL (ref 0–0.04)
IMM GRANULOCYTES NFR BLD AUTO: 0.3 % (ref 0–0.5)
IRON SERPL-MCNC: 73 UG/DL (ref 30–160)
LDLC SERPL CALC-MCNC: 97.6 MG/DL (ref 63–159)
LYMPHOCYTES # BLD AUTO: 1.5 K/UL (ref 1–4.8)
LYMPHOCYTES NFR BLD: 23.7 % (ref 18–48)
MAGNESIUM SERPL-MCNC: 2.1 MG/DL (ref 1.6–2.6)
MCH RBC QN AUTO: 32.6 PG (ref 27–31)
MCHC RBC AUTO-ENTMCNC: 31.3 G/DL (ref 32–36)
MCV RBC AUTO: 104 FL (ref 82–98)
MONOCYTES # BLD AUTO: 0.5 K/UL (ref 0.3–1)
MONOCYTES NFR BLD: 7.4 % (ref 4–15)
NEUTROPHILS # BLD AUTO: 4.2 K/UL (ref 1.8–7.7)
NEUTROPHILS NFR BLD: 66.8 % (ref 38–73)
NONHDLC SERPL-MCNC: 115 MG/DL
NRBC BLD-RTO: 0 /100 WBC
PLATELET # BLD AUTO: 159 K/UL (ref 150–350)
PMV BLD AUTO: 11.6 FL (ref 9.2–12.9)
POTASSIUM SERPL-SCNC: 4.4 MMOL/L (ref 3.5–5.1)
PROT SERPL-MCNC: 7.1 G/DL (ref 6–8.4)
PTH-INTACT SERPL-MCNC: 69 PG/ML (ref 9–77)
RBC # BLD AUTO: 3.62 M/UL (ref 4–5.4)
SATURATED IRON: 21 % (ref 20–50)
SODIUM SERPL-SCNC: 141 MMOL/L (ref 136–145)
TOTAL IRON BINDING CAPACITY: 342 UG/DL (ref 250–450)
TRANSFERRIN SERPL-MCNC: 231 MG/DL (ref 200–375)
TRIGL SERPL-MCNC: 87 MG/DL (ref 30–150)
TSH SERPL DL<=0.005 MIU/L-ACNC: 1.81 UIU/ML (ref 0.4–4)
VIT B12 SERPL-MCNC: 857 PG/ML (ref 210–950)
WBC # BLD AUTO: 6.32 K/UL (ref 3.9–12.7)

## 2020-12-29 PROCEDURE — 80061 LIPID PANEL: CPT

## 2020-12-29 PROCEDURE — 82728 ASSAY OF FERRITIN: CPT

## 2020-12-29 PROCEDURE — 83880 ASSAY OF NATRIURETIC PEPTIDE: CPT

## 2020-12-29 PROCEDURE — 80053 COMPREHEN METABOLIC PANEL: CPT

## 2020-12-29 PROCEDURE — 83036 HEMOGLOBIN GLYCOSYLATED A1C: CPT

## 2020-12-29 PROCEDURE — 83540 ASSAY OF IRON: CPT

## 2020-12-29 PROCEDURE — 83970 ASSAY OF PARATHORMONE: CPT

## 2020-12-29 PROCEDURE — 83735 ASSAY OF MAGNESIUM: CPT

## 2020-12-29 PROCEDURE — 82306 VITAMIN D 25 HYDROXY: CPT

## 2020-12-29 PROCEDURE — 82746 ASSAY OF FOLIC ACID SERUM: CPT

## 2020-12-29 PROCEDURE — 36415 COLL VENOUS BLD VENIPUNCTURE: CPT | Mod: PO

## 2020-12-29 PROCEDURE — 85025 COMPLETE CBC W/AUTO DIFF WBC: CPT

## 2020-12-29 PROCEDURE — 83090 ASSAY OF HOMOCYSTEINE: CPT

## 2020-12-29 PROCEDURE — 84443 ASSAY THYROID STIM HORMONE: CPT

## 2020-12-29 PROCEDURE — 83921 ORGANIC ACID SINGLE QUANT: CPT

## 2020-12-29 PROCEDURE — 84630 ASSAY OF ZINC: CPT

## 2020-12-29 PROCEDURE — 82607 VITAMIN B-12: CPT

## 2021-01-04 LAB — ZINC SERPL-MCNC: 79 UG/DL (ref 60–130)

## 2021-01-05 LAB — METHYLMALONATE SERPL-SCNC: 0.38 UMOL/L

## 2021-01-06 PROBLEM — R73.03 PREDIABETES: Status: ACTIVE | Noted: 2019-12-09

## 2021-01-22 RX ORDER — PANTOPRAZOLE SODIUM 40 MG/1
40 TABLET, DELAYED RELEASE ORAL DAILY
Qty: 90 TABLET | Refills: 0 | Status: SHIPPED | OUTPATIENT
Start: 2021-01-22 | End: 2021-05-16 | Stop reason: SDUPTHER

## 2021-02-13 DIAGNOSIS — F41.9 ANXIETY: ICD-10-CM

## 2021-02-14 RX ORDER — CITALOPRAM 10 MG/1
TABLET ORAL
Qty: 90 TABLET | Refills: 1 | Status: SHIPPED | OUTPATIENT
Start: 2021-02-14 | End: 2021-05-16 | Stop reason: SDUPTHER

## 2021-02-14 RX ORDER — BUSPIRONE HYDROCHLORIDE 5 MG/1
TABLET ORAL
Qty: 180 TABLET | Refills: 1 | Status: SHIPPED | OUTPATIENT
Start: 2021-02-14 | End: 2021-05-16 | Stop reason: SDUPTHER

## 2021-04-15 DIAGNOSIS — D75.89 MACROCYTOSIS WITHOUT ANEMIA: ICD-10-CM

## 2021-04-15 DIAGNOSIS — F41.9 ANXIETY: ICD-10-CM

## 2021-04-15 DIAGNOSIS — N18.31 CKD STAGE G3A/A1, GFR 45-59 AND ALBUMIN CREATININE RATIO <30 MG/G: ICD-10-CM

## 2021-04-15 DIAGNOSIS — Z79.01 LONG TERM CURRENT USE OF ANTICOAGULANT THERAPY: ICD-10-CM

## 2021-04-15 DIAGNOSIS — Z13.21 ENCOUNTER FOR VITAMIN DEFICIENCY SCREENING: ICD-10-CM

## 2021-04-15 DIAGNOSIS — I48.20 CHRONIC ATRIAL FIBRILLATION: ICD-10-CM

## 2021-04-15 DIAGNOSIS — Z00.00 WELLNESS EXAMINATION: Primary | ICD-10-CM

## 2021-04-15 DIAGNOSIS — I13.0 BENIGN HYPERTENSIVE HEART AND KIDNEY DISEASE WITH CHF, NYHA CLASS 1 AND CKD STAGE 3: ICD-10-CM

## 2021-04-15 DIAGNOSIS — Z13.1 SCREENING FOR DIABETES MELLITUS: ICD-10-CM

## 2021-04-15 DIAGNOSIS — Z13.29 SCREENING FOR THYROID DISORDER: ICD-10-CM

## 2021-04-15 DIAGNOSIS — Z13.89 SCREENING FOR HEMATURIA OR PROTEINURIA: ICD-10-CM

## 2021-04-15 DIAGNOSIS — Z79.899 ENCOUNTER FOR LONG-TERM CURRENT USE OF MEDICATION: ICD-10-CM

## 2021-04-15 DIAGNOSIS — Z13.0 SCREENING FOR DEFICIENCY ANEMIA: ICD-10-CM

## 2021-04-15 DIAGNOSIS — Z13.220 SCREENING FOR LIPID DISORDERS: ICD-10-CM

## 2021-04-15 DIAGNOSIS — R73.03 PREDIABETES: ICD-10-CM

## 2021-04-15 DIAGNOSIS — I50.32 CHRONIC DIASTOLIC HEART FAILURE: ICD-10-CM

## 2021-04-15 DIAGNOSIS — E78.2 MIXED HYPERLIPIDEMIA: ICD-10-CM

## 2021-04-15 DIAGNOSIS — N18.30 BENIGN HYPERTENSIVE HEART AND KIDNEY DISEASE WITH CHF, NYHA CLASS 1 AND CKD STAGE 3: ICD-10-CM

## 2021-04-15 DIAGNOSIS — M85.89 OSTEOPENIA OF MULTIPLE SITES: ICD-10-CM

## 2021-04-15 DIAGNOSIS — Z79.899 LONG TERM CURRENT USE OF DIURETIC: ICD-10-CM

## 2021-04-16 RX ORDER — LANOLIN ALCOHOL/MO/W.PET/CERES
1000 CREAM (GRAM) TOPICAL DAILY
Qty: 90 TABLET | Refills: 1 | Status: SHIPPED | OUTPATIENT
Start: 2021-04-16 | End: 2021-05-16 | Stop reason: SDUPTHER

## 2021-05-04 ENCOUNTER — PATIENT MESSAGE (OUTPATIENT)
Dept: RESEARCH | Facility: HOSPITAL | Age: 86
End: 2021-05-04

## 2021-05-04 ENCOUNTER — LAB VISIT (OUTPATIENT)
Dept: LAB | Facility: HOSPITAL | Age: 86
End: 2021-05-04
Attending: INTERNAL MEDICINE
Payer: MEDICARE

## 2021-05-04 DIAGNOSIS — Z13.0 SCREENING FOR DEFICIENCY ANEMIA: ICD-10-CM

## 2021-05-04 DIAGNOSIS — R73.03 PREDIABETES: ICD-10-CM

## 2021-05-04 DIAGNOSIS — I48.20 CHRONIC ATRIAL FIBRILLATION: ICD-10-CM

## 2021-05-04 DIAGNOSIS — E78.2 MIXED HYPERLIPIDEMIA: ICD-10-CM

## 2021-05-04 DIAGNOSIS — Z13.1 SCREENING FOR DIABETES MELLITUS: ICD-10-CM

## 2021-05-04 DIAGNOSIS — I50.32 CHRONIC DIASTOLIC HEART FAILURE: ICD-10-CM

## 2021-05-04 DIAGNOSIS — F41.9 ANXIETY: ICD-10-CM

## 2021-05-04 DIAGNOSIS — M85.89 OSTEOPENIA OF MULTIPLE SITES: ICD-10-CM

## 2021-05-04 DIAGNOSIS — N18.31 CKD STAGE G3A/A1, GFR 45-59 AND ALBUMIN CREATININE RATIO <30 MG/G: ICD-10-CM

## 2021-05-04 DIAGNOSIS — Z13.220 SCREENING FOR LIPID DISORDERS: ICD-10-CM

## 2021-05-04 DIAGNOSIS — Z13.21 ENCOUNTER FOR VITAMIN DEFICIENCY SCREENING: ICD-10-CM

## 2021-05-04 DIAGNOSIS — Z79.899 ENCOUNTER FOR LONG-TERM CURRENT USE OF MEDICATION: ICD-10-CM

## 2021-05-04 DIAGNOSIS — Z79.899 LONG TERM CURRENT USE OF DIURETIC: ICD-10-CM

## 2021-05-04 DIAGNOSIS — I13.0 BENIGN HYPERTENSIVE HEART AND KIDNEY DISEASE WITH CHF, NYHA CLASS 1 AND CKD STAGE 3: ICD-10-CM

## 2021-05-04 DIAGNOSIS — N18.30 BENIGN HYPERTENSIVE HEART AND KIDNEY DISEASE WITH CHF, NYHA CLASS 1 AND CKD STAGE 3: ICD-10-CM

## 2021-05-04 DIAGNOSIS — Z13.29 SCREENING FOR THYROID DISORDER: ICD-10-CM

## 2021-05-04 DIAGNOSIS — D75.89 MACROCYTOSIS WITHOUT ANEMIA: ICD-10-CM

## 2021-05-04 DIAGNOSIS — Z13.89 SCREENING FOR HEMATURIA OR PROTEINURIA: ICD-10-CM

## 2021-05-04 DIAGNOSIS — Z79.01 LONG TERM CURRENT USE OF ANTICOAGULANT THERAPY: ICD-10-CM

## 2021-05-04 DIAGNOSIS — Z00.00 WELLNESS EXAMINATION: ICD-10-CM

## 2021-05-04 LAB
BASOPHILS # BLD AUTO: 0.02 K/UL (ref 0–0.2)
BASOPHILS NFR BLD: 0.4 % (ref 0–1.9)
DIFFERENTIAL METHOD: ABNORMAL
EOSINOPHIL # BLD AUTO: 0.2 K/UL (ref 0–0.5)
EOSINOPHIL NFR BLD: 2.6 % (ref 0–8)
ERYTHROCYTE [DISTWIDTH] IN BLOOD BY AUTOMATED COUNT: 13.6 % (ref 11.5–14.5)
HCT VFR BLD AUTO: 38 % (ref 37–48.5)
HGB BLD-MCNC: 12.7 G/DL (ref 12–16)
IMM GRANULOCYTES # BLD AUTO: 0.02 K/UL (ref 0–0.04)
IMM GRANULOCYTES NFR BLD AUTO: 0.4 % (ref 0–0.5)
LYMPHOCYTES # BLD AUTO: 1.8 K/UL (ref 1–4.8)
LYMPHOCYTES NFR BLD: 31 % (ref 18–48)
MCH RBC QN AUTO: 33 PG (ref 27–31)
MCHC RBC AUTO-ENTMCNC: 33.4 G/DL (ref 32–36)
MCV RBC AUTO: 99 FL (ref 82–98)
MONOCYTES # BLD AUTO: 0.5 K/UL (ref 0.3–1)
MONOCYTES NFR BLD: 8.3 % (ref 4–15)
NEUTROPHILS # BLD AUTO: 3.3 K/UL (ref 1.8–7.7)
NEUTROPHILS NFR BLD: 57.3 % (ref 38–73)
NRBC BLD-RTO: 0 /100 WBC
PLATELET # BLD AUTO: 195 K/UL (ref 150–450)
PMV BLD AUTO: 11.8 FL (ref 9.2–12.9)
RBC # BLD AUTO: 3.85 M/UL (ref 4–5.4)
WBC # BLD AUTO: 5.67 K/UL (ref 3.9–12.7)

## 2021-05-04 PROCEDURE — 83970 ASSAY OF PARATHORMONE: CPT | Performed by: INTERNAL MEDICINE

## 2021-05-04 PROCEDURE — 83735 ASSAY OF MAGNESIUM: CPT | Performed by: INTERNAL MEDICINE

## 2021-05-04 PROCEDURE — 84550 ASSAY OF BLOOD/URIC ACID: CPT | Performed by: INTERNAL MEDICINE

## 2021-05-04 PROCEDURE — 80053 COMPREHEN METABOLIC PANEL: CPT | Performed by: INTERNAL MEDICINE

## 2021-05-04 PROCEDURE — 36415 COLL VENOUS BLD VENIPUNCTURE: CPT | Mod: PO | Performed by: INTERNAL MEDICINE

## 2021-05-04 PROCEDURE — 82607 VITAMIN B-12: CPT | Performed by: INTERNAL MEDICINE

## 2021-05-04 PROCEDURE — 84425 ASSAY OF VITAMIN B-1: CPT | Performed by: INTERNAL MEDICINE

## 2021-05-04 PROCEDURE — 83921 ORGANIC ACID SINGLE QUANT: CPT | Performed by: INTERNAL MEDICINE

## 2021-05-04 PROCEDURE — 83540 ASSAY OF IRON: CPT | Performed by: INTERNAL MEDICINE

## 2021-05-04 PROCEDURE — 84207 ASSAY OF VITAMIN B-6: CPT | Performed by: INTERNAL MEDICINE

## 2021-05-04 PROCEDURE — 80061 LIPID PANEL: CPT | Performed by: INTERNAL MEDICINE

## 2021-05-04 PROCEDURE — 85025 COMPLETE CBC W/AUTO DIFF WBC: CPT | Performed by: INTERNAL MEDICINE

## 2021-05-04 PROCEDURE — 84443 ASSAY THYROID STIM HORMONE: CPT | Performed by: INTERNAL MEDICINE

## 2021-05-04 PROCEDURE — 83036 HEMOGLOBIN GLYCOSYLATED A1C: CPT | Performed by: INTERNAL MEDICINE

## 2021-05-04 PROCEDURE — 82728 ASSAY OF FERRITIN: CPT | Performed by: INTERNAL MEDICINE

## 2021-05-05 LAB
ALBUMIN SERPL BCP-MCNC: 3.8 G/DL (ref 3.5–5.2)
ALP SERPL-CCNC: 76 U/L (ref 55–135)
ALT SERPL W/O P-5'-P-CCNC: 8 U/L (ref 10–44)
ANION GAP SERPL CALC-SCNC: 10 MMOL/L (ref 8–16)
AST SERPL-CCNC: 18 U/L (ref 10–40)
BILIRUB SERPL-MCNC: 0.6 MG/DL (ref 0.1–1)
BUN SERPL-MCNC: 25 MG/DL (ref 8–23)
CALCIUM SERPL-MCNC: 9.3 MG/DL (ref 8.7–10.5)
CHLORIDE SERPL-SCNC: 107 MMOL/L (ref 95–110)
CHOLEST SERPL-MCNC: 184 MG/DL (ref 120–199)
CHOLEST/HDLC SERPL: 3 {RATIO} (ref 2–5)
CO2 SERPL-SCNC: 24 MMOL/L (ref 23–29)
CREAT SERPL-MCNC: 1 MG/DL (ref 0.5–1.4)
EST. GFR  (AFRICAN AMERICAN): 58.5 ML/MIN/1.73 M^2
EST. GFR  (NON AFRICAN AMERICAN): 50.8 ML/MIN/1.73 M^2
ESTIMATED AVG GLUCOSE: 120 MG/DL (ref 68–131)
FERRITIN SERPL-MCNC: 157 NG/ML (ref 20–300)
GLUCOSE SERPL-MCNC: 81 MG/DL (ref 70–110)
HBA1C MFR BLD: 5.8 % (ref 4–5.6)
HDLC SERPL-MCNC: 61 MG/DL (ref 40–75)
HDLC SERPL: 33.2 % (ref 20–50)
IRON SERPL-MCNC: 66 UG/DL (ref 30–160)
LDLC SERPL CALC-MCNC: 106.8 MG/DL (ref 63–159)
MAGNESIUM SERPL-MCNC: 2.1 MG/DL (ref 1.6–2.6)
NONHDLC SERPL-MCNC: 123 MG/DL
POTASSIUM SERPL-SCNC: 4.2 MMOL/L (ref 3.5–5.1)
PROT SERPL-MCNC: 7.2 G/DL (ref 6–8.4)
PTH-INTACT SERPL-MCNC: 90 PG/ML (ref 9–77)
SATURATED IRON: 20 % (ref 20–50)
SODIUM SERPL-SCNC: 141 MMOL/L (ref 136–145)
TOTAL IRON BINDING CAPACITY: 330 UG/DL (ref 250–450)
TRANSFERRIN SERPL-MCNC: 223 MG/DL (ref 200–375)
TRIGL SERPL-MCNC: 81 MG/DL (ref 30–150)
TSH SERPL DL<=0.005 MIU/L-ACNC: 3.98 UIU/ML (ref 0.4–4)
URATE SERPL-MCNC: 6.2 MG/DL (ref 2.4–5.7)
VIT B12 SERPL-MCNC: 1316 PG/ML (ref 210–950)

## 2021-05-06 DIAGNOSIS — N25.81 SECONDARY HYPERPARATHYROIDISM: ICD-10-CM

## 2021-05-06 DIAGNOSIS — Z13.21 ENCOUNTER FOR VITAMIN DEFICIENCY SCREENING: ICD-10-CM

## 2021-05-06 DIAGNOSIS — M85.89 OSTEOPENIA OF MULTIPLE SITES: Primary | ICD-10-CM

## 2021-05-06 PROBLEM — E79.0 ELEVATED URIC ACID IN BLOOD: Status: ACTIVE | Noted: 2021-05-06

## 2021-05-08 LAB — METHYLMALONATE SERPL-SCNC: 0.18 UMOL/L

## 2021-05-10 LAB
PYRIDOXAL SERPL-MCNC: 30 UG/L (ref 5–50)
VIT B1 BLD-MCNC: 55 UG/L (ref 38–122)

## 2021-05-11 ENCOUNTER — OFFICE VISIT (OUTPATIENT)
Dept: FAMILY MEDICINE | Facility: CLINIC | Age: 86
End: 2021-05-11
Payer: MEDICARE

## 2021-05-11 DIAGNOSIS — I48.20 CHRONIC ATRIAL FIBRILLATION: ICD-10-CM

## 2021-05-11 DIAGNOSIS — I51.7 BIATRIAL ENLARGEMENT: ICD-10-CM

## 2021-05-11 DIAGNOSIS — E66.3 OVERWEIGHT WITH BODY MASS INDEX (BMI) OF 28 TO 28.9 IN ADULT: ICD-10-CM

## 2021-05-11 DIAGNOSIS — Z28.21 COVID-19 VIRUS VACCINATION REFUSED: Chronic | ICD-10-CM

## 2021-05-11 DIAGNOSIS — Z71.2 ENCOUNTER TO DISCUSS TEST RESULTS: ICD-10-CM

## 2021-05-11 DIAGNOSIS — B37.2 CANDIDAL INTERTRIGO: ICD-10-CM

## 2021-05-11 DIAGNOSIS — N18.31 CKD STAGE G3A/A1, GFR 45-59 AND ALBUMIN CREATININE RATIO <30 MG/G: ICD-10-CM

## 2021-05-11 DIAGNOSIS — K21.9 GASTROESOPHAGEAL REFLUX DISEASE WITHOUT ESOPHAGITIS: ICD-10-CM

## 2021-05-11 DIAGNOSIS — F41.9 ANXIETY: ICD-10-CM

## 2021-05-11 DIAGNOSIS — E78.2 MIXED HYPERLIPIDEMIA: Chronic | ICD-10-CM

## 2021-05-11 DIAGNOSIS — E79.0 ELEVATED URIC ACID IN BLOOD: ICD-10-CM

## 2021-05-11 DIAGNOSIS — I27.20 PULMONARY HYPERTENSION: ICD-10-CM

## 2021-05-11 DIAGNOSIS — Z79.899 LONG TERM CURRENT USE OF DIURETIC: ICD-10-CM

## 2021-05-11 DIAGNOSIS — Z79.01 LONG TERM CURRENT USE OF ANTICOAGULANT THERAPY: ICD-10-CM

## 2021-05-11 DIAGNOSIS — N25.81 SECONDARY HYPERPARATHYROIDISM: ICD-10-CM

## 2021-05-11 DIAGNOSIS — D75.89 MACROCYTOSIS WITHOUT ANEMIA: ICD-10-CM

## 2021-05-11 DIAGNOSIS — M54.50 CHRONIC BILATERAL LOW BACK PAIN WITHOUT SCIATICA: ICD-10-CM

## 2021-05-11 DIAGNOSIS — Z79.83 LONG TERM USE OF BISPHOSPHONATES: Chronic | ICD-10-CM

## 2021-05-11 DIAGNOSIS — R73.03 PREDIABETES: ICD-10-CM

## 2021-05-11 DIAGNOSIS — Z79.891 LONG TERM CURRENT USE OF OPIATE ANALGESIC: Chronic | ICD-10-CM

## 2021-05-11 DIAGNOSIS — Z86.39 HISTORY OF NON ANEMIC VITAMIN B12 DEFICIENCY: ICD-10-CM

## 2021-05-11 DIAGNOSIS — Z00.00 WELLNESS EXAMINATION: Primary | ICD-10-CM

## 2021-05-11 DIAGNOSIS — I50.32 CHRONIC DIASTOLIC HEART FAILURE: ICD-10-CM

## 2021-05-11 DIAGNOSIS — G89.29 CHRONIC BILATERAL LOW BACK PAIN WITHOUT SCIATICA: ICD-10-CM

## 2021-05-11 DIAGNOSIS — M85.89 OSTEOPENIA OF MULTIPLE SITES: ICD-10-CM

## 2021-05-11 PROCEDURE — 1101F PT FALLS ASSESS-DOCD LE1/YR: CPT | Mod: CPTII,S$GLB,, | Performed by: INTERNAL MEDICINE

## 2021-05-11 PROCEDURE — 1126F PR PAIN SEVERITY QUANTIFIED, NO PAIN PRESENT: ICD-10-PCS | Mod: S$GLB,,, | Performed by: INTERNAL MEDICINE

## 2021-05-11 PROCEDURE — 3288F FALL RISK ASSESSMENT DOCD: CPT | Mod: CPTII,S$GLB,, | Performed by: INTERNAL MEDICINE

## 2021-05-11 PROCEDURE — 99499 UNLISTED E&M SERVICE: CPT | Mod: S$GLB,,, | Performed by: INTERNAL MEDICINE

## 2021-05-11 PROCEDURE — 1126F AMNT PAIN NOTED NONE PRSNT: CPT | Mod: S$GLB,,, | Performed by: INTERNAL MEDICINE

## 2021-05-11 PROCEDURE — 99999 PR PBB SHADOW E&M-EST. PATIENT-LVL III: ICD-10-PCS | Mod: PBBFAC,,, | Performed by: INTERNAL MEDICINE

## 2021-05-11 PROCEDURE — 99397 PER PM REEVAL EST PAT 65+ YR: CPT | Mod: S$GLB,,, | Performed by: INTERNAL MEDICINE

## 2021-05-11 PROCEDURE — 99397 PR PREVENTIVE VISIT,EST,65 & OVER: ICD-10-PCS | Mod: S$GLB,,, | Performed by: INTERNAL MEDICINE

## 2021-05-11 PROCEDURE — 3288F PR FALLS RISK ASSESSMENT DOCUMENTED: ICD-10-PCS | Mod: CPTII,S$GLB,, | Performed by: INTERNAL MEDICINE

## 2021-05-11 PROCEDURE — 1101F PR PT FALLS ASSESS DOC 0-1 FALLS W/OUT INJ PAST YR: ICD-10-PCS | Mod: CPTII,S$GLB,, | Performed by: INTERNAL MEDICINE

## 2021-05-11 PROCEDURE — 99999 PR PBB SHADOW E&M-EST. PATIENT-LVL III: CPT | Mod: PBBFAC,,, | Performed by: INTERNAL MEDICINE

## 2021-05-11 PROCEDURE — 99499 RISK ADDL DX/OHS AUDIT: ICD-10-PCS | Mod: S$GLB,,, | Performed by: INTERNAL MEDICINE

## 2021-05-11 RX ORDER — CARVEDILOL 12.5 MG/1
12.5 TABLET ORAL 2 TIMES DAILY WITH MEALS
COMMUNITY
End: 2021-05-16 | Stop reason: SDUPTHER

## 2021-05-11 RX ORDER — NYSTATIN 100000 [USP'U]/G
POWDER TOPICAL 4 TIMES DAILY
Qty: 60 G | Refills: 0 | Status: SHIPPED | OUTPATIENT
Start: 2021-05-11

## 2021-05-16 VITALS
BODY MASS INDEX: 28.48 KG/M2 | HEART RATE: 72 BPM | SYSTOLIC BLOOD PRESSURE: 136 MMHG | TEMPERATURE: 98 F | WEIGHT: 141 LBS | RESPIRATION RATE: 16 BRPM | DIASTOLIC BLOOD PRESSURE: 88 MMHG

## 2021-05-16 PROBLEM — I51.7 BIATRIAL ENLARGEMENT: Status: ACTIVE | Noted: 2021-05-16

## 2021-05-16 PROBLEM — E66.3 OVERWEIGHT WITH BODY MASS INDEX (BMI) OF 28 TO 28.9 IN ADULT: Status: RESOLVED | Noted: 2019-12-09 | Resolved: 2021-05-16

## 2021-05-16 PROBLEM — Z28.21 COVID-19 VIRUS VACCINATION REFUSED: Chronic | Status: ACTIVE | Noted: 2021-05-16

## 2021-05-16 PROBLEM — Z79.83 LONG TERM USE OF BISPHOSPHONATES: Chronic | Status: ACTIVE | Noted: 2021-05-16

## 2021-05-16 PROBLEM — G89.29 CHRONIC BILATERAL LOW BACK PAIN WITHOUT SCIATICA: Status: ACTIVE | Noted: 2021-05-16

## 2021-05-16 PROBLEM — I27.20 PULMONARY HYPERTENSION: Status: ACTIVE | Noted: 2021-05-16

## 2021-05-16 PROBLEM — B37.2 CANDIDAL INTERTRIGO: Status: ACTIVE | Noted: 2021-05-16

## 2021-05-16 PROBLEM — Z86.39 HISTORY OF NON ANEMIC VITAMIN B12 DEFICIENCY: Chronic | Status: ACTIVE | Noted: 2021-05-16

## 2021-05-16 PROBLEM — E66.3 OVERWEIGHT WITH BODY MASS INDEX (BMI) OF 28 TO 28.9 IN ADULT: Status: ACTIVE | Noted: 2019-12-09

## 2021-05-16 PROBLEM — M54.50 CHRONIC BILATERAL LOW BACK PAIN WITHOUT SCIATICA: Status: ACTIVE | Noted: 2021-05-16

## 2021-05-16 PROBLEM — E79.0 ELEVATED URIC ACID IN BLOOD: Chronic | Status: ACTIVE | Noted: 2021-05-06

## 2021-05-16 PROBLEM — Z86.39 HISTORY OF NON ANEMIC VITAMIN B12 DEFICIENCY: Status: ACTIVE | Noted: 2021-05-16

## 2021-05-16 RX ORDER — ALENDRONATE SODIUM 70 MG/1
70 TABLET ORAL
Qty: 12 TABLET | Refills: 1 | Status: SHIPPED | OUTPATIENT
Start: 2021-05-16 | End: 2022-05-24 | Stop reason: SDUPTHER

## 2021-05-16 RX ORDER — CARVEDILOL 12.5 MG/1
12.5 TABLET ORAL 2 TIMES DAILY WITH MEALS
Qty: 180 TABLET | Refills: 0 | Status: SHIPPED | OUTPATIENT
Start: 2021-05-16 | End: 2021-12-08 | Stop reason: SDUPTHER

## 2021-05-16 RX ORDER — LANOLIN ALCOHOL/MO/W.PET/CERES
1000 CREAM (GRAM) TOPICAL DAILY
Qty: 90 TABLET | Refills: 1 | Status: SHIPPED | OUTPATIENT
Start: 2021-05-16 | End: 2021-12-08 | Stop reason: SDUPTHER

## 2021-05-16 RX ORDER — FUROSEMIDE 20 MG/1
20 TABLET ORAL DAILY
Qty: 90 TABLET | Refills: 0 | Status: SHIPPED | OUTPATIENT
Start: 2021-05-16 | End: 2021-11-16 | Stop reason: SDUPTHER

## 2021-05-16 RX ORDER — CITALOPRAM 10 MG/1
10 TABLET ORAL NIGHTLY
Qty: 90 TABLET | Refills: 1 | Status: SHIPPED | OUTPATIENT
Start: 2021-05-16 | End: 2021-12-08 | Stop reason: SDUPTHER

## 2021-05-16 RX ORDER — PRAVASTATIN SODIUM 40 MG/1
40 TABLET ORAL NIGHTLY
Qty: 90 TABLET | Refills: 1 | Status: SHIPPED | OUTPATIENT
Start: 2021-05-16 | End: 2021-12-08 | Stop reason: SDUPTHER

## 2021-05-16 RX ORDER — FERROUS SULFATE 325(65) MG
325 TABLET ORAL EVERY OTHER DAY
Qty: 45 TABLET | Refills: 1 | Status: SHIPPED | OUTPATIENT
Start: 2021-05-16 | End: 2021-12-08 | Stop reason: SDUPTHER

## 2021-05-16 RX ORDER — PANTOPRAZOLE SODIUM 40 MG/1
40 TABLET, DELAYED RELEASE ORAL DAILY
Qty: 90 TABLET | Refills: 1 | Status: SHIPPED | OUTPATIENT
Start: 2021-05-16 | End: 2021-12-08 | Stop reason: SDUPTHER

## 2021-05-16 RX ORDER — BUSPIRONE HYDROCHLORIDE 5 MG/1
5 TABLET ORAL 2 TIMES DAILY
Qty: 180 TABLET | Refills: 1 | Status: SHIPPED | OUTPATIENT
Start: 2021-05-16 | End: 2021-12-08 | Stop reason: SDUPTHER

## 2021-05-16 RX ORDER — ACETAMINOPHEN 500 MG
1 TABLET ORAL DAILY
Qty: 90 CAPSULE | Refills: 1 | Status: SHIPPED | OUTPATIENT
Start: 2021-05-16 | End: 2021-12-08 | Stop reason: SDUPTHER

## 2021-05-16 RX ORDER — FOLIC ACID 1 MG/1
1000 TABLET ORAL DAILY
Qty: 90 TABLET | Refills: 1 | Status: SHIPPED | OUTPATIENT
Start: 2021-05-16 | End: 2021-12-08 | Stop reason: SDUPTHER

## 2021-07-01 ENCOUNTER — PATIENT MESSAGE (OUTPATIENT)
Dept: ADMINISTRATIVE | Facility: OTHER | Age: 86
End: 2021-07-01

## 2021-11-16 DIAGNOSIS — I50.32 CHRONIC DIASTOLIC HEART FAILURE: ICD-10-CM

## 2021-11-16 RX ORDER — FUROSEMIDE 20 MG/1
20 TABLET ORAL DAILY
Qty: 90 TABLET | Refills: 1 | Status: SHIPPED | OUTPATIENT
Start: 2021-11-16 | End: 2022-02-09 | Stop reason: SDUPTHER

## 2021-12-08 ENCOUNTER — OFFICE VISIT (OUTPATIENT)
Dept: FAMILY MEDICINE | Facility: CLINIC | Age: 86
End: 2021-12-08
Payer: MEDICARE

## 2021-12-08 VITALS
HEIGHT: 59 IN | TEMPERATURE: 98 F | WEIGHT: 140 LBS | DIASTOLIC BLOOD PRESSURE: 84 MMHG | HEART RATE: 100 BPM | BODY MASS INDEX: 28.22 KG/M2 | SYSTOLIC BLOOD PRESSURE: 155 MMHG

## 2021-12-08 DIAGNOSIS — I48.20 CHRONIC ATRIAL FIBRILLATION: ICD-10-CM

## 2021-12-08 DIAGNOSIS — Z01.89 ENCOUNTER FOR LABORATORY EXAMINATION: Primary | ICD-10-CM

## 2021-12-08 DIAGNOSIS — N18.31 CKD STAGE G3A/A1, GFR 45-59 AND ALBUMIN CREATININE RATIO <30 MG/G: ICD-10-CM

## 2021-12-08 DIAGNOSIS — R79.9 ABNORMAL FINDING OF BLOOD CHEMISTRY, UNSPECIFIED: ICD-10-CM

## 2021-12-08 DIAGNOSIS — E78.2 MIXED HYPERLIPIDEMIA: Chronic | ICD-10-CM

## 2021-12-08 DIAGNOSIS — Z86.39 HISTORY OF NON ANEMIC VITAMIN B12 DEFICIENCY: ICD-10-CM

## 2021-12-08 DIAGNOSIS — M85.89 OSTEOPENIA OF MULTIPLE SITES: ICD-10-CM

## 2021-12-08 DIAGNOSIS — K21.9 GASTROESOPHAGEAL REFLUX DISEASE WITHOUT ESOPHAGITIS: ICD-10-CM

## 2021-12-08 DIAGNOSIS — D75.89 MACROCYTOSIS WITHOUT ANEMIA: ICD-10-CM

## 2021-12-08 DIAGNOSIS — F41.9 ANXIETY: ICD-10-CM

## 2021-12-08 PROCEDURE — 99397 PER PM REEVAL EST PAT 65+ YR: CPT | Mod: HCNC,S$GLB,, | Performed by: NURSE PRACTITIONER

## 2021-12-08 PROCEDURE — 99397 PR PREVENTIVE VISIT,EST,65 & OVER: ICD-10-PCS | Mod: HCNC,S$GLB,, | Performed by: NURSE PRACTITIONER

## 2021-12-08 PROCEDURE — 99499 UNLISTED E&M SERVICE: CPT | Mod: S$GLB,,, | Performed by: NURSE PRACTITIONER

## 2021-12-08 PROCEDURE — 99999 PR PBB SHADOW E&M-EST. PATIENT-LVL III: CPT | Mod: PBBFAC,HCNC,, | Performed by: NURSE PRACTITIONER

## 2021-12-08 PROCEDURE — 99499 RISK ADDL DX/OHS AUDIT: ICD-10-PCS | Mod: S$GLB,,, | Performed by: NURSE PRACTITIONER

## 2021-12-08 PROCEDURE — U0005 INFEC AGEN DETEC AMPLI PROBE: HCPCS | Performed by: NURSE PRACTITIONER

## 2021-12-08 PROCEDURE — 99999 PR PBB SHADOW E&M-EST. PATIENT-LVL III: ICD-10-PCS | Mod: PBBFAC,HCNC,, | Performed by: NURSE PRACTITIONER

## 2021-12-08 PROCEDURE — U0003 INFECTIOUS AGENT DETECTION BY NUCLEIC ACID (DNA OR RNA); SEVERE ACUTE RESPIRATORY SYNDROME CORONAVIRUS 2 (SARS-COV-2) (CORONAVIRUS DISEASE [COVID-19]), AMPLIFIED PROBE TECHNIQUE, MAKING USE OF HIGH THROUGHPUT TECHNOLOGIES AS DESCRIBED BY CMS-2020-01-R: HCPCS | Mod: HCNC | Performed by: NURSE PRACTITIONER

## 2021-12-08 RX ORDER — FOLIC ACID 1 MG/1
1000 TABLET ORAL DAILY
Qty: 90 TABLET | Refills: 1 | Status: SHIPPED | OUTPATIENT
Start: 2021-12-08

## 2021-12-08 RX ORDER — CARVEDILOL 12.5 MG/1
12.5 TABLET ORAL 2 TIMES DAILY WITH MEALS
Qty: 60 TABLET | Refills: 2 | Status: SHIPPED | OUTPATIENT
Start: 2021-12-08 | End: 2022-03-29 | Stop reason: SDUPTHER

## 2021-12-08 RX ORDER — FERROUS SULFATE 325(65) MG
325 TABLET ORAL EVERY OTHER DAY
Qty: 45 TABLET | Refills: 1 | Status: SHIPPED | OUTPATIENT
Start: 2021-12-08 | End: 2022-07-25 | Stop reason: SDUPTHER

## 2021-12-08 RX ORDER — PANTOPRAZOLE SODIUM 40 MG/1
40 TABLET, DELAYED RELEASE ORAL DAILY
Qty: 30 TABLET | Refills: 2 | Status: SHIPPED | OUTPATIENT
Start: 2021-12-08 | End: 2022-03-29 | Stop reason: SDUPTHER

## 2021-12-08 RX ORDER — BUSPIRONE HYDROCHLORIDE 5 MG/1
5 TABLET ORAL 2 TIMES DAILY
Qty: 60 TABLET | Refills: 2 | Status: SHIPPED | OUTPATIENT
Start: 2021-12-08 | End: 2022-07-25 | Stop reason: SDUPTHER

## 2021-12-08 RX ORDER — PRAVASTATIN SODIUM 40 MG/1
40 TABLET ORAL NIGHTLY
Qty: 30 TABLET | Refills: 2 | Status: SHIPPED | OUTPATIENT
Start: 2021-12-08 | End: 2022-03-29 | Stop reason: SDUPTHER

## 2021-12-08 RX ORDER — LANOLIN ALCOHOL/MO/W.PET/CERES
1000 CREAM (GRAM) TOPICAL DAILY
Qty: 90 TABLET | Refills: 1 | Status: SHIPPED | OUTPATIENT
Start: 2021-12-08 | End: 2022-07-25 | Stop reason: SDUPTHER

## 2021-12-08 RX ORDER — CITALOPRAM 10 MG/1
10 TABLET ORAL NIGHTLY
Qty: 30 TABLET | Refills: 2 | Status: SHIPPED | OUTPATIENT
Start: 2021-12-08 | End: 2022-07-25 | Stop reason: SDUPTHER

## 2021-12-08 RX ORDER — ACETAMINOPHEN 500 MG
1 TABLET ORAL DAILY
Qty: 90 CAPSULE | Refills: 1 | Status: SHIPPED | OUTPATIENT
Start: 2021-12-08 | End: 2022-07-25 | Stop reason: SDUPTHER

## 2021-12-09 LAB
SARS-COV-2 RNA RESP QL NAA+PROBE: NOT DETECTED
SARS-COV-2- CYCLE NUMBER: NORMAL

## 2021-12-20 ENCOUNTER — TELEPHONE (OUTPATIENT)
Dept: FAMILY MEDICINE | Facility: CLINIC | Age: 86
End: 2021-12-20
Payer: MEDICARE

## 2021-12-20 DIAGNOSIS — R05.9 COUGH: Primary | ICD-10-CM

## 2021-12-20 RX ORDER — BENZONATATE 100 MG/1
100 CAPSULE ORAL 3 TIMES DAILY PRN
Qty: 90 CAPSULE | Refills: 0 | Status: SHIPPED | OUTPATIENT
Start: 2021-12-20 | End: 2022-01-19

## 2022-02-09 DIAGNOSIS — I50.32 CHRONIC DIASTOLIC HEART FAILURE: ICD-10-CM

## 2022-02-09 RX ORDER — FUROSEMIDE 20 MG/1
40 TABLET ORAL DAILY
Qty: 60 TABLET | Refills: 0 | Status: SHIPPED | OUTPATIENT
Start: 2022-02-09 | End: 2022-03-08 | Stop reason: SDUPTHER

## 2022-02-09 NOTE — TELEPHONE ENCOUNTER
----- Message from Gabrielle Sierra sent at 2/9/2022 10:46 AM CST -----  Regarding: question about lab order/fluid pill  Contact: Amaya, granddaughter  Appt desk was trying to schedule the labwork.  There was a warning that is is not covered.  Please advise. Amaya would like to bring pt in tomorrow morning. [phone]    Asking if her fluid pills can be increased. Her ankles have been swelling and pt has been taking two pills. Pt will run of of medication

## 2022-02-09 NOTE — TELEPHONE ENCOUNTER
"Spoke to pt. Granddaughter. States pts. "feet have been a little more swollen I started giving her 2 pills a day and that helped tremendously." Requesting refill lasix increase to 40mg. Orders pended please advise.   She also states insurance will not cover a1c asking if provider really wants to have this done with the rest of the labs for appt. Tomorrow. Please advise.   "

## 2022-02-09 NOTE — TELEPHONE ENCOUNTER
Care Due:                  Date            Visit Type   Department     Provider  --------------------------------------------------------------------------------    Last Visit: None Found      None         None Found  Next Visit: None Scheduled  None         None Found                                                            Last  Test          Frequency    Reason                     Performed    Due Date  --------------------------------------------------------------------------------    Office Visit  12 months..  furosemide...............  Not Found    Overdue    CMP.........  12 months..  furosemide...............  05- 04-    Powered by [employer] by [employer]. Reference number: [id number].   2/09/2022 11:16:38 AM CST

## 2022-02-09 NOTE — TELEPHONE ENCOUNTER
Will provide temporary Rx for Lasix at increased dose. She should keep lab appointment as scheduled tomorrow. We can cancel A1C to avoid additional charge since last one was borderline. Please schedule her an appointment in 1 month for routine follow up with PCP.

## 2022-02-10 ENCOUNTER — LAB VISIT (OUTPATIENT)
Dept: LAB | Facility: HOSPITAL | Age: 87
End: 2022-02-10
Attending: NURSE PRACTITIONER
Payer: MEDICARE

## 2022-02-10 DIAGNOSIS — Z01.89 ENCOUNTER FOR LABORATORY EXAMINATION: ICD-10-CM

## 2022-02-10 DIAGNOSIS — R79.9 ABNORMAL FINDING OF BLOOD CHEMISTRY, UNSPECIFIED: ICD-10-CM

## 2022-02-10 LAB
ALBUMIN SERPL BCP-MCNC: 3.9 G/DL (ref 3.5–5.2)
ALP SERPL-CCNC: 71 U/L (ref 55–135)
ALT SERPL W/O P-5'-P-CCNC: 12 U/L (ref 10–44)
ANION GAP SERPL CALC-SCNC: 14 MMOL/L (ref 8–16)
AST SERPL-CCNC: 22 U/L (ref 10–40)
BILIRUB SERPL-MCNC: 1 MG/DL (ref 0.1–1)
BUN SERPL-MCNC: 24 MG/DL (ref 8–23)
CALCIUM SERPL-MCNC: 9.6 MG/DL (ref 8.7–10.5)
CHLORIDE SERPL-SCNC: 99 MMOL/L (ref 95–110)
CHOLEST SERPL-MCNC: 138 MG/DL (ref 120–199)
CHOLEST/HDLC SERPL: 2.1 {RATIO} (ref 2–5)
CO2 SERPL-SCNC: 28 MMOL/L (ref 23–29)
CREAT SERPL-MCNC: 1.2 MG/DL (ref 0.5–1.4)
ERYTHROCYTE [DISTWIDTH] IN BLOOD BY AUTOMATED COUNT: 13.9 % (ref 11.5–14.5)
EST. GFR  (AFRICAN AMERICAN): 46.6 ML/MIN/1.73 M^2
EST. GFR  (NON AFRICAN AMERICAN): 40.4 ML/MIN/1.73 M^2
GLUCOSE SERPL-MCNC: 96 MG/DL (ref 70–110)
HCT VFR BLD AUTO: 35.7 % (ref 37–48.5)
HDLC SERPL-MCNC: 66 MG/DL (ref 40–75)
HDLC SERPL: 47.8 % (ref 20–50)
HGB BLD-MCNC: 11.6 G/DL (ref 12–16)
LDLC SERPL CALC-MCNC: 56 MG/DL (ref 63–159)
MCH RBC QN AUTO: 33.8 PG (ref 27–31)
MCHC RBC AUTO-ENTMCNC: 32.5 G/DL (ref 32–36)
MCV RBC AUTO: 104 FL (ref 82–98)
NONHDLC SERPL-MCNC: 72 MG/DL
PLATELET # BLD AUTO: 174 K/UL (ref 150–450)
PMV BLD AUTO: 11.7 FL (ref 9.2–12.9)
POTASSIUM SERPL-SCNC: 3.7 MMOL/L (ref 3.5–5.1)
PROT SERPL-MCNC: 7.5 G/DL (ref 6–8.4)
RBC # BLD AUTO: 3.43 M/UL (ref 4–5.4)
SODIUM SERPL-SCNC: 141 MMOL/L (ref 136–145)
TRIGL SERPL-MCNC: 80 MG/DL (ref 30–150)
WBC # BLD AUTO: 5.9 K/UL (ref 3.9–12.7)

## 2022-02-10 PROCEDURE — 36415 COLL VENOUS BLD VENIPUNCTURE: CPT | Mod: HCNC,PO | Performed by: NURSE PRACTITIONER

## 2022-02-10 PROCEDURE — 80053 COMPREHEN METABOLIC PANEL: CPT | Mod: HCNC | Performed by: NURSE PRACTITIONER

## 2022-02-10 PROCEDURE — 80061 LIPID PANEL: CPT | Mod: HCNC | Performed by: NURSE PRACTITIONER

## 2022-02-10 PROCEDURE — 85027 COMPLETE CBC AUTOMATED: CPT | Mod: HCNC | Performed by: NURSE PRACTITIONER

## 2022-02-11 ENCOUNTER — TELEPHONE (OUTPATIENT)
Dept: FAMILY MEDICINE | Facility: CLINIC | Age: 87
End: 2022-02-11
Payer: MEDICARE

## 2022-02-11 NOTE — TELEPHONE ENCOUNTER
----- Message from Jen Burrows sent at 2/11/2022 11:29 AM CST -----  Contact: [phone]  Patient is returning a phone call.  Who left a message for the patient: ?  Does patient know what this is regarding:  lab results   Would you like a call back, or a response through your [facility] portal?:   Call back   Comments:

## 2022-03-08 ENCOUNTER — OFFICE VISIT (OUTPATIENT)
Dept: FAMILY MEDICINE | Facility: CLINIC | Age: 87
End: 2022-03-08
Payer: MEDICARE

## 2022-03-08 VITALS
DIASTOLIC BLOOD PRESSURE: 78 MMHG | HEIGHT: 59 IN | SYSTOLIC BLOOD PRESSURE: 114 MMHG | OXYGEN SATURATION: 97 % | WEIGHT: 139 LBS | HEART RATE: 90 BPM | BODY MASS INDEX: 28.02 KG/M2

## 2022-03-08 DIAGNOSIS — N18.31 CKD STAGE G3A/A1, GFR 45-59 AND ALBUMIN CREATININE RATIO <30 MG/G: Chronic | ICD-10-CM

## 2022-03-08 DIAGNOSIS — I50.32 CHRONIC DIASTOLIC HEART FAILURE: Chronic | ICD-10-CM

## 2022-03-08 DIAGNOSIS — R60.0 BILATERAL LOWER EXTREMITY EDEMA: Primary | ICD-10-CM

## 2022-03-08 PROCEDURE — 1101F PR PT FALLS ASSESS DOC 0-1 FALLS W/OUT INJ PAST YR: ICD-10-PCS | Mod: CPTII,S$GLB,, | Performed by: NURSE PRACTITIONER

## 2022-03-08 PROCEDURE — 99999 PR PBB SHADOW E&M-EST. PATIENT-LVL IV: ICD-10-PCS | Mod: PBBFAC,,, | Performed by: NURSE PRACTITIONER

## 2022-03-08 PROCEDURE — 1126F AMNT PAIN NOTED NONE PRSNT: CPT | Mod: CPTII,S$GLB,, | Performed by: NURSE PRACTITIONER

## 2022-03-08 PROCEDURE — 99499 RISK ADDL DX/OHS AUDIT: ICD-10-PCS | Mod: S$GLB,,, | Performed by: NURSE PRACTITIONER

## 2022-03-08 PROCEDURE — 1126F PR PAIN SEVERITY QUANTIFIED, NO PAIN PRESENT: ICD-10-PCS | Mod: CPTII,S$GLB,, | Performed by: NURSE PRACTITIONER

## 2022-03-08 PROCEDURE — 99499 UNLISTED E&M SERVICE: CPT | Mod: S$GLB,,, | Performed by: NURSE PRACTITIONER

## 2022-03-08 PROCEDURE — 1159F PR MEDICATION LIST DOCUMENTED IN MEDICAL RECORD: ICD-10-PCS | Mod: CPTII,S$GLB,, | Performed by: NURSE PRACTITIONER

## 2022-03-08 PROCEDURE — 1160F PR REVIEW ALL MEDS BY PRESCRIBER/CLIN PHARMACIST DOCUMENTED: ICD-10-PCS | Mod: CPTII,S$GLB,, | Performed by: NURSE PRACTITIONER

## 2022-03-08 PROCEDURE — 1160F RVW MEDS BY RX/DR IN RCRD: CPT | Mod: CPTII,S$GLB,, | Performed by: NURSE PRACTITIONER

## 2022-03-08 PROCEDURE — 3288F PR FALLS RISK ASSESSMENT DOCUMENTED: ICD-10-PCS | Mod: CPTII,S$GLB,, | Performed by: NURSE PRACTITIONER

## 2022-03-08 PROCEDURE — 99214 OFFICE O/P EST MOD 30 MIN: CPT | Mod: S$GLB,,, | Performed by: NURSE PRACTITIONER

## 2022-03-08 PROCEDURE — 3288F FALL RISK ASSESSMENT DOCD: CPT | Mod: CPTII,S$GLB,, | Performed by: NURSE PRACTITIONER

## 2022-03-08 PROCEDURE — 99214 PR OFFICE/OUTPT VISIT, EST, LEVL IV, 30-39 MIN: ICD-10-PCS | Mod: S$GLB,,, | Performed by: NURSE PRACTITIONER

## 2022-03-08 PROCEDURE — 1159F MED LIST DOCD IN RCRD: CPT | Mod: CPTII,S$GLB,, | Performed by: NURSE PRACTITIONER

## 2022-03-08 PROCEDURE — 99999 PR PBB SHADOW E&M-EST. PATIENT-LVL IV: CPT | Mod: PBBFAC,,, | Performed by: NURSE PRACTITIONER

## 2022-03-08 PROCEDURE — 1101F PT FALLS ASSESS-DOCD LE1/YR: CPT | Mod: CPTII,S$GLB,, | Performed by: NURSE PRACTITIONER

## 2022-03-08 RX ORDER — INFLUENZA VACCINE, ADJUVANTED 15; 15; 15; 15 UG/.5ML; UG/.5ML; UG/.5ML; UG/.5ML
INJECTION, SUSPENSION INTRAMUSCULAR
COMMUNITY
Start: 2021-11-16 | End: 2022-04-12

## 2022-03-08 RX ORDER — FUROSEMIDE 20 MG/1
40 TABLET ORAL DAILY
Qty: 60 TABLET | Refills: 0 | Status: SHIPPED | OUTPATIENT
Start: 2022-03-08 | End: 2022-04-25 | Stop reason: SDUPTHER

## 2022-03-08 NOTE — PROGRESS NOTES
Assessment/Plan:  Problem List Items Addressed This Visit        Cardiac/Vascular    Chronic diastolic heart failure (Chronic)    Overview     - Chronic, stable  - Established with cardiologist, Dr. Garcia at [facility]     Echo 06/2020  Interpretation Summary  Ejection Fraction = 55-60%.  Increased Left Atrial Pressure  The left atrium is severely dilated.  The right atrium is severely dilated.  There is trace mitral regurgitation.  There is moderate to severe tricuspid regurgitation.  Right ventricular systolic pressure is elevated at 50-60mmHg              Renal/    CKD stage G3a/A1, GFR 45-59 and albumin creatinine ratio <30 mg/g (Chronic)    Overview     - Chronic, stable  - Established with nephrologist, Dr. Juanita LAGUNAS  Lab Results   Component Value Date     02/10/2022    K 3.7 02/10/2022    CL 99 02/10/2022    CO2 28 02/10/2022    BUN 24 (H) 02/10/2022    CREATININE 1.2 02/10/2022    CALCIUM 9.6 02/10/2022    ANIONGAP 14 02/10/2022    ESTGFRAFRICA 46.6 (A) 02/10/2022    EGFRNONAA 40.4 (A) 02/10/2022                Other Visit Diagnoses     Bilateral lower extremity edema    -  Primary    Relevant Medications    furosemide (LASIX) 20 MG tablet        Continue Lasix  Recommend compression socks, elevate legs, low sodium diet  Close follow up with specialists - nephrology and cardiology     Follow up in about 1 week (around 3/15/2022), or if symptoms worsen or fail to improve, for Follow up with PCP.    Keren Rodriguez NP  _____________________________________________________________________________________________________________________________________________________    CC: edema       HPI: Patient is in clinic today as an established patient here for swelling to bilateral lower extremities. Past medical history includes CHF and CKD. This is an ongoing problem. She is taking Lasix 40 mg daily which provides some relief. She is established with nephrologist, Dr. Grant and cardiologist, Dr. Armstrong at [facility].  There has been no chest pain, palpitations, shortness of breath, or pain to BLE. Theres been no recent travel or surgery.     BMP  Lab Results   Component Value Date     02/10/2022    K 3.7 02/10/2022    CL 99 02/10/2022    CO2 28 02/10/2022    BUN 24 (H) 02/10/2022    CREATININE 1.2 02/10/2022    CALCIUM 9.6 02/10/2022    ANIONGAP 14 02/10/2022    ESTGFRAFRICA 46.6 (A) 02/10/2022    EGFRNONAA 40.4 (A) 02/10/2022     Echo 06/2020  Interpretation Summary  Ejection Fraction = 55-60%.  Increased Left Atrial Pressure  The left atrium is severely dilated.  The right atrium is severely dilated.  There is trace mitral regurgitation.  There is moderate to severe tricuspid regurgitation.  Right ventricular systolic pressure is elevated at 50-60mmHg    Past Medical History:  Past Medical History:   Diagnosis Date    Age-related osteoporosis without current pathological fracture 8/13/2018    Chronic atrial fibrillation 8/13/2018    Chronic diastolic heart failure 8/13/2018    Elevated brain natriuretic peptide (BNP) level 6/19/2020    Elevated uric acid in blood 5/6/2021    GIB (gastrointestinal bleeding) 5/30/2020    Macrocytosis without anemia 9/23/2020    Mixed hyperlipidemia 8/13/2018    Osteopenia of multiple sites 12/9/2019    Prediabetes 12/9/2019    RA (rheumatoid arthritis)     Secondary hyperparathyroidism 5/6/2021    Tricuspid valve regurgitation 9/19/2018    Vitamin B12 deficiency (dietary) anemia 12/9/2019    Vitamin D insufficiency 12/9/2019     Past Surgical History:   Procedure Laterality Date    TOTAL ABDOMINAL HYSTERECTOMY W/ BILATERAL SALPINGOOPHORECTOMY       Review of patient's allergies indicates:   Allergen Reactions    Meloxicam Other (See Comments)     Decreases renal function    Nitrofurantoin monohyd/m-cryst Itching     Social History     Tobacco Use    Smoking status: Never Smoker    Smokeless tobacco: Never Used   Substance Use Topics    Alcohol use: Not Currently     Drug use: Never     Family History   Problem Relation Age of Onset    Aneurysm Mother      Current Outpatient Medications on File Prior to Visit   Medication Sig Dispense Refill    alendronate (FOSAMAX) 70 MG tablet Take 1 tablet (70 mg total) by mouth every 7 days. 12 tablet 1    apixaban (ELIQUIS) 2.5 mg Tab Take 1 tablet (2.5 mg total) by mouth 2 (two) times daily. 60 tablet 2    busPIRone (BUSPAR) 5 MG Tab Take 1 tablet (5 mg total) by mouth 2 (two) times daily. 60 tablet 2    carvediloL (COREG) 12.5 MG tablet Take 1 tablet (12.5 mg total) by mouth 2 (two) times daily with meals. 60 tablet 2    cholecalciferol, vitamin D3, (VITAMIN D3) 50 mcg (2,000 unit) Cap Take 1 capsule (2,000 Units total) by mouth once daily. 90 capsule 1    citalopram (CELEXA) 10 MG tablet Take 1 tablet (10 mg total) by mouth every evening. 30 tablet 2    cyanocobalamin (VITAMIN B-12) 1000 MCG tablet Take 1 tablet (1,000 mcg total) by mouth once daily. 90 tablet 1    cyclobenzaprine (FLEXERIL) 10 MG tablet Take 10 mg by mouth as needed. at bedtime.   1    ferrous sulfate (FEOSOL) 325 mg (65 mg iron) Tab tablet Take 1 tablet (325 mg total) by mouth every other day. 45 tablet 1    folic acid (FOLVITE) 1 MG tablet Take 1 tablet (1,000 mcg total) by mouth once daily. 90 tablet 1    gabapentin (NEURONTIN) 100 MG capsule Take 100 mg by mouth 2 (two) times daily.   1    HYDROcodone-acetaminophen (NORCO) 7.5-325 mg per tablet Take 1 tablet by mouth every 6 (six) hours as needed for Pain.      nystatin (MYCOSTATIN) powder Apply topically 4 (four) times daily. Apply 1 application topically 4 (four) times daily. 60 g 0    pantoprazole (PROTONIX) 40 MG tablet Take 1 tablet (40 mg total) by mouth once daily. 30 tablet 2    pravastatin (PRAVACHOL) 40 MG tablet Take 1 tablet (40 mg total) by mouth nightly. 30 tablet 2    [DISCONTINUED] furosemide (LASIX) 20 MG tablet Take 2 tablets (40 mg total) by mouth once daily. 60 tablet 0    FLUAD  "QUAD 2021-22,65Y UP,,PF, 60 mcg (15 mcg x 4)/0.5 mL Syrg        No current facility-administered medications on file prior to visit.     Review of Systems   Constitutional: Negative for appetite change, chills, fatigue and fever.   HENT: Negative for congestion, rhinorrhea and sore throat.    Eyes: Negative for visual disturbance.   Respiratory: Negative for cough and shortness of breath.    Cardiovascular: Positive for leg swelling. Negative for chest pain and palpitations.   Gastrointestinal: Negative for abdominal pain, diarrhea and vomiting.   Genitourinary: Negative for difficulty urinating, dysuria and hematuria.   Musculoskeletal: Positive for arthralgias. Negative for myalgias.   Skin: Negative for rash and wound.   Neurological: Positive for weakness. Negative for dizziness and headaches.   Psychiatric/Behavioral: Negative for behavioral problems. The patient is not nervous/anxious.      Vitals:    03/08/22 0748   BP: 114/78   BP Location: Right arm   Pulse: 90   SpO2: 97%   Weight: 63 kg (139 lb)   Height: 4' 11" (1.499 m)     Wt Readings from Last 3 Encounters:   03/08/22 63 kg (139 lb)   12/08/21 63.5 kg (140 lb)   05/11/21 64 kg (141 lb)     Physical Exam  Vitals and nursing note reviewed.   Constitutional:       General: She is not in acute distress.     Appearance: Normal appearance. She is well-developed. She is not ill-appearing, toxic-appearing or diaphoretic.      Comments: Here with granddaughter, Amaya SHIPLEYT:      Head: Normocephalic and atraumatic.   Eyes:      Conjunctiva/sclera: Conjunctivae normal.   Cardiovascular:      Rate and Rhythm: Rhythm irregular.      Heart sounds: Murmur heard.      Comments: Hx atrial fibrillation  Pulmonary:      Effort: Pulmonary effort is normal.      Breath sounds: Normal breath sounds.   Abdominal:      General: Bowel sounds are normal.      Palpations: Abdomen is soft.      Tenderness: There is no abdominal tenderness.   Musculoskeletal:         General: No " tenderness.      Right lower le+ Edema present.      Left lower le+ Edema present.   Lymphadenopathy:      Cervical: No cervical adenopathy.   Skin:     General: Skin is warm and dry.      Capillary Refill: Capillary refill takes less than 2 seconds.      Findings: No bruising or rash.   Neurological:      General: No focal deficit present.      Mental Status: She is alert and oriented to person, place, and time.      Sensory: No sensory deficit.      Motor: No weakness.      Gait: Gait normal.   Psychiatric:         Mood and Affect: Mood normal.         Behavior: Behavior normal.         Thought Content: Thought content normal.         Judgment: Judgment normal.       Health Maintenance   Topic Date Due    TETANUS VACCINE  Never done    DEXA Scan  2022    Lipid Panel  02/10/2023

## 2022-03-08 NOTE — PROGRESS NOTES
Patient, Miriam Oro (MRN #[id number]), presented with a recent Estimated Glumerular Filtration Rate (EGFR) between 30 and 45 consistent with the definition of chronic kidney disease stage 3 - moderate (ICD10 - N18.3).    eGFR if non    Date Value Ref Range Status   02/10/2022 40.4 (A) >60 mL/min/1.73 m^2 Final     Comment:     Calculation used to obtain the estimated glomerular filtration  rate (eGFR) is the CKD-EPI equation.          The patient's chronic kidney disease stage 3 was monitored, evaluated, addressed and/or treated. This addendum to the medical record is made on 03/08/2022.

## 2022-03-29 DIAGNOSIS — E78.2 MIXED HYPERLIPIDEMIA: Chronic | ICD-10-CM

## 2022-03-29 DIAGNOSIS — K21.9 GASTROESOPHAGEAL REFLUX DISEASE WITHOUT ESOPHAGITIS: ICD-10-CM

## 2022-03-29 DIAGNOSIS — I48.20 CHRONIC ATRIAL FIBRILLATION: ICD-10-CM

## 2022-03-29 RX ORDER — PRAVASTATIN SODIUM 40 MG/1
40 TABLET ORAL NIGHTLY
Qty: 30 TABLET | Refills: 2 | Status: SHIPPED | OUTPATIENT
Start: 2022-03-29 | End: 2022-06-24 | Stop reason: SDUPTHER

## 2022-03-29 RX ORDER — PANTOPRAZOLE SODIUM 40 MG/1
40 TABLET, DELAYED RELEASE ORAL DAILY
Qty: 30 TABLET | Refills: 2 | Status: SHIPPED | OUTPATIENT
Start: 2022-03-29 | End: 2022-07-25 | Stop reason: SDUPTHER

## 2022-03-29 RX ORDER — CARVEDILOL 12.5 MG/1
12.5 TABLET ORAL 2 TIMES DAILY WITH MEALS
Qty: 60 TABLET | Refills: 2 | Status: SHIPPED | OUTPATIENT
Start: 2022-03-29 | End: 2022-07-25 | Stop reason: SDUPTHER

## 2022-03-29 NOTE — TELEPHONE ENCOUNTER
----- Message from Randi Eric sent at 3/29/2022  8:39 AM CDT -----  RX Refill Request    Who Called: Amaya(granddaughter)    Refill or New Rx: Refill     RX Name and Strength: see below    How is the patient currently taking it? (ex. 1XDay): see below    Is this a 30 day or 90 day RX:    Preferred Pharmacy with phone number:  [facility] - [place], [place] - [address] (Ph: [phone])    Local or Mail Order:Local     Ordering Provider: Michael Das Call Back Number:[phone]        Medications:    carvediloL (COREG) 12.5 MG tablet  Take 1 tablet (12.5 mg total) by mouth 2 (two) times daily with meals.,     apixaban (ELIQUIS) 2.5 mg Tab  Take 1 tablet (2.5 mg total) by mouth 2 (two) times daily.,     pantoprazole (PROTONIX) 40 MG tablet  Take 1 tablet (40 mg total) by mouth once daily    pravastatin (PRAVACHOL) 40 MG tablet  Take 1 tablet (40 mg total) by mouth nightly.,

## 2022-04-06 ENCOUNTER — TELEPHONE (OUTPATIENT)
Dept: FAMILY MEDICINE | Facility: CLINIC | Age: 87
End: 2022-04-06
Payer: MEDICARE

## 2022-04-06 NOTE — TELEPHONE ENCOUNTER
----- Message from Whitney Curry sent at 4/6/2022  8:07 AM CDT -----  .Type:  Patient Returning Call    Who Called:Miriam  Who Left Message for Patient:nurse  Does the patient know what this is regarding?:appointment reschedule  Would the patient rather a call back or a response via [facility]? Call back  Best Call Back Number:.[phone]    Additional Information: pt stated she had a missed call

## 2022-04-06 NOTE — TELEPHONE ENCOUNTER
Pt. Gave verbal to speak to grandchild amaya. Advised Amaya Veliz NP wants pt. To follow up with cardiologist and nephrologist and establish care with a PCP. Amaya verbalized understanding. Advised she would call to schedule with cardiologist and nephrologist. Amaya rescheduled today's appointment with an MD to establish care. Verbalized understanding. Phone call ended.

## 2022-04-12 ENCOUNTER — OFFICE VISIT (OUTPATIENT)
Dept: FAMILY MEDICINE | Facility: CLINIC | Age: 87
End: 2022-04-12
Payer: MEDICARE

## 2022-04-12 VITALS
RESPIRATION RATE: 18 BRPM | WEIGHT: 137.69 LBS | HEIGHT: 59 IN | SYSTOLIC BLOOD PRESSURE: 108 MMHG | BODY MASS INDEX: 27.76 KG/M2 | DIASTOLIC BLOOD PRESSURE: 72 MMHG | TEMPERATURE: 98 F | HEART RATE: 80 BPM

## 2022-04-12 DIAGNOSIS — I87.2 VENOUS INSUFFICIENCY: ICD-10-CM

## 2022-04-12 DIAGNOSIS — F41.9 ANXIETY: Chronic | ICD-10-CM

## 2022-04-12 DIAGNOSIS — I13.0 BENIGN HYPERTENSIVE HEART AND KIDNEY DISEASE WITH CHF, NYHA CLASS 1 AND CKD STAGE 3: ICD-10-CM

## 2022-04-12 DIAGNOSIS — K21.9 GASTROESOPHAGEAL REFLUX DISEASE WITHOUT ESOPHAGITIS: Chronic | ICD-10-CM

## 2022-04-12 DIAGNOSIS — I27.20 PULMONARY HYPERTENSION: ICD-10-CM

## 2022-04-12 DIAGNOSIS — Z79.01 LONG TERM CURRENT USE OF ANTICOAGULANT THERAPY: Chronic | ICD-10-CM

## 2022-04-12 DIAGNOSIS — E78.2 MIXED HYPERLIPIDEMIA: ICD-10-CM

## 2022-04-12 DIAGNOSIS — N18.30 BENIGN HYPERTENSIVE HEART AND KIDNEY DISEASE WITH CHF, NYHA CLASS 1 AND CKD STAGE 3: ICD-10-CM

## 2022-04-12 DIAGNOSIS — Z79.891 LONG TERM CURRENT USE OF OPIATE ANALGESIC: Chronic | ICD-10-CM

## 2022-04-12 DIAGNOSIS — I50.32 CHRONIC DIASTOLIC HEART FAILURE: Primary | ICD-10-CM

## 2022-04-12 DIAGNOSIS — Z79.899 LONG TERM CURRENT USE OF DIURETIC: Chronic | ICD-10-CM

## 2022-04-12 DIAGNOSIS — N25.81 SECONDARY HYPERPARATHYROIDISM: ICD-10-CM

## 2022-04-12 DIAGNOSIS — I48.20 CHRONIC ATRIAL FIBRILLATION: Chronic | ICD-10-CM

## 2022-04-12 PROBLEM — B37.2 CANDIDAL INTERTRIGO: Status: RESOLVED | Noted: 2021-05-16 | Resolved: 2022-04-12

## 2022-04-12 PROCEDURE — 1101F PR PT FALLS ASSESS DOC 0-1 FALLS W/OUT INJ PAST YR: ICD-10-PCS | Mod: CPTII,S$GLB,, | Performed by: STUDENT IN AN ORGANIZED HEALTH CARE EDUCATION/TRAINING PROGRAM

## 2022-04-12 PROCEDURE — 99499 RISK ADDL DX/OHS AUDIT: ICD-10-PCS | Mod: S$GLB,,, | Performed by: STUDENT IN AN ORGANIZED HEALTH CARE EDUCATION/TRAINING PROGRAM

## 2022-04-12 PROCEDURE — 1101F PT FALLS ASSESS-DOCD LE1/YR: CPT | Mod: CPTII,S$GLB,, | Performed by: STUDENT IN AN ORGANIZED HEALTH CARE EDUCATION/TRAINING PROGRAM

## 2022-04-12 PROCEDURE — 3288F FALL RISK ASSESSMENT DOCD: CPT | Mod: CPTII,S$GLB,, | Performed by: STUDENT IN AN ORGANIZED HEALTH CARE EDUCATION/TRAINING PROGRAM

## 2022-04-12 PROCEDURE — 1126F AMNT PAIN NOTED NONE PRSNT: CPT | Mod: CPTII,S$GLB,, | Performed by: STUDENT IN AN ORGANIZED HEALTH CARE EDUCATION/TRAINING PROGRAM

## 2022-04-12 PROCEDURE — 99214 OFFICE O/P EST MOD 30 MIN: CPT | Mod: S$GLB,,, | Performed by: STUDENT IN AN ORGANIZED HEALTH CARE EDUCATION/TRAINING PROGRAM

## 2022-04-12 PROCEDURE — 99999 PR PBB SHADOW E&M-EST. PATIENT-LVL V: CPT | Mod: PBBFAC,,, | Performed by: STUDENT IN AN ORGANIZED HEALTH CARE EDUCATION/TRAINING PROGRAM

## 2022-04-12 PROCEDURE — 3288F PR FALLS RISK ASSESSMENT DOCUMENTED: ICD-10-PCS | Mod: CPTII,S$GLB,, | Performed by: STUDENT IN AN ORGANIZED HEALTH CARE EDUCATION/TRAINING PROGRAM

## 2022-04-12 PROCEDURE — 99214 PR OFFICE/OUTPT VISIT, EST, LEVL IV, 30-39 MIN: ICD-10-PCS | Mod: S$GLB,,, | Performed by: STUDENT IN AN ORGANIZED HEALTH CARE EDUCATION/TRAINING PROGRAM

## 2022-04-12 PROCEDURE — 1159F PR MEDICATION LIST DOCUMENTED IN MEDICAL RECORD: ICD-10-PCS | Mod: CPTII,S$GLB,, | Performed by: STUDENT IN AN ORGANIZED HEALTH CARE EDUCATION/TRAINING PROGRAM

## 2022-04-12 PROCEDURE — 99999 PR PBB SHADOW E&M-EST. PATIENT-LVL V: ICD-10-PCS | Mod: PBBFAC,,, | Performed by: STUDENT IN AN ORGANIZED HEALTH CARE EDUCATION/TRAINING PROGRAM

## 2022-04-12 PROCEDURE — 1159F MED LIST DOCD IN RCRD: CPT | Mod: CPTII,S$GLB,, | Performed by: STUDENT IN AN ORGANIZED HEALTH CARE EDUCATION/TRAINING PROGRAM

## 2022-04-12 PROCEDURE — 1126F PR PAIN SEVERITY QUANTIFIED, NO PAIN PRESENT: ICD-10-PCS | Mod: CPTII,S$GLB,, | Performed by: STUDENT IN AN ORGANIZED HEALTH CARE EDUCATION/TRAINING PROGRAM

## 2022-04-12 PROCEDURE — 99499 UNLISTED E&M SERVICE: CPT | Mod: S$GLB,,, | Performed by: STUDENT IN AN ORGANIZED HEALTH CARE EDUCATION/TRAINING PROGRAM

## 2022-04-12 NOTE — PROGRESS NOTES
Problem List Items Addressed This Visit        Psychiatric    Long term current use of opiate analgesic (Chronic)    Overview     Follows with pain mgmt for back pain, chronically on norco 7.5           Anxiety (Chronic)    Overview     Chronic hx; well controlled with celexa and buspar              Pulmonary    Pulmonary hypertension    Relevant Orders    Ambulatory referral/consult to Outpatient Case Management       Cardiac/Vascular    Chronic atrial fibrillation (Chronic)    Chronic diastolic heart failure - Primary (Chronic)    Overview     - Chronic, stable  - Established with cardiologist, Dr. Levy     Echo 06/2020  Interpretation Summary  Ejection Fraction = 55-60%.  Increased Left Atrial Pressure  The left atrium is severely dilated.  The right atrium is severely dilated.  There is trace mitral regurgitation.  There is moderate to severe tricuspid regurgitation.  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)    Relevant Orders    Hypertension Digital Medicine (HDMP) Enrollment Order (Completed)    Hypertension Digital Medicine (HDMP): Assign Onboarding Questionnaires (Completed)    Lipids Digital Medicine (LDMP) Enrollment Order (Completed)    Lipids Digital Medicine (LDMP): Assign Onboarding Questionnaires (Completed)    Venous insufficiency    Overview     Chronic hx with 2+ edema to upper shins, lasix 20mg BID  Dicussed elevations of LE, please be mindful of  pillows as not avoid tripping and fallinng  -rx compression stockings           Relevant Orders    COMPRESSION STOCKINGS       Hematology    Long term current use of anticoagulant therapy (Chronic)    Overview     A fib chronic hx; chronic anticoagulation with Eliquis 2.5 mg BID   Follows with cards, Dr. Levy               Endocrine    Secondary hyperparathyroidism       GI    Gastroesophageal reflux disease without esophagitis (Chronic)    Overview     -symptoms controlled with daily PPI  -denies alarm symptoms, such as dysphagia, weight loss or N/V  -continue lifestyle modification with avoidance of acidic foods, caffeine, late night eating                  Other    Long term current use of diuretic (Chronic)    Overview     Chronic venous insuffiencey; lasix 20mg BID                    Patient ID: Miriam Oro is a 88 y.o. female.    Chief Complaint:  establish care    Previous PCP: Dr. Oro      Here with granddaughterAmaya   Patient is here to establish care. Has a hx of  has a past medical history of Age-related osteoporosis without current pathological fracture (8/13/2018), Chronic atrial fibrillation (8/13/2018), Chronic diastolic heart failure (8/13/2018), Elevated brain natriuretic peptide (BNP) level (6/19/2020), Elevated uric acid in blood (5/6/2021), GIB (gastrointestinal bleeding) (5/30/2020), Macrocytosis without anemia (9/23/2020), Mixed hyperlipidemia (8/13/2018), Osteopenia of multiple sites (12/9/2019), Prediabetes (12/9/2019), RA (rheumatoid arthritis), Secondary hyperparathyroidism (5/6/2021), Tricuspid valve regurgitation (9/19/2018), Vitamin B12 deficiency (dietary) anemia (12/9/2019), and Vitamin D insufficiency (12/9/2019).   Has cards appt with Dr. Levy end of April   Follows with pain mgmt for back pain, chronically on norco 7.5    Denies fevers, chills, chest pain, SOB, fatigue, abdominal pain, nausea, vomiting, dysuria, hematuria, hematochezia, or melena.           History:   LMP: No LMP recorded. Patient is postmenopausal.   MMG:  No relevant family history has been documented. Last mammo 2014, no longer completing due to advancing age.   PAP: N/A   Colon cancer screening:   Colonoscopy 2020 through [facility]    [facility] Maintenance Topics with due status: Not Due       Topic Last Completion Date    DEXA Scan 11/02/2020    Lipid Panel 02/10/2022        ==============================================  History reviewed.   There are no preventive care reminders to display for this patient.    Past Medical History:  Past Medical History:   Diagnosis Date    Age-related osteoporosis without current pathological fracture 8/13/2018    Chronic atrial fibrillation 8/13/2018    Chronic diastolic heart failure 8/13/2018    Elevated brain natriuretic peptide (BNP) level 6/19/2020    Elevated uric acid in blood 5/6/2021    GIB (gastrointestinal bleeding) 5/30/2020    Macrocytosis without anemia 9/23/2020    Mixed hyperlipidemia 8/13/2018    Osteopenia of multiple sites 12/9/2019    Prediabetes 12/9/2019    RA (rheumatoid arthritis)     Secondary hyperparathyroidism 5/6/2021    Tricuspid valve regurgitation 9/19/2018    Vitamin B12 deficiency (dietary) anemia 12/9/2019    Vitamin D insufficiency 12/9/2019     Past Surgical History:   Procedure Laterality Date    TOTAL ABDOMINAL HYSTERECTOMY W/ BILATERAL SALPINGOOPHORECTOMY       Review of patient's allergies indicates:   Allergen Reactions    Meloxicam Other (See Comments)     Decreases renal function    Nitrofurantoin monohyd/m-cryst Itching     Current Outpatient Medications on File Prior to Visit   Medication Sig Dispense Refill    alendronate (FOSAMAX) 70 MG tablet Take 1 tablet (70 mg total) by mouth every 7 days. 12 tablet 1    apixaban (ELIQUIS) 2.5 mg Tab Take 1 tablet (2.5 mg total) by mouth 2 (two) times daily. 60 tablet 2    busPIRone (BUSPAR) 5 MG Tab Take 1 tablet (5 mg total) by mouth 2 (two) times daily.  60 tablet 2    carvediloL (COREG) 12.5 MG tablet Take 1 tablet (12.5 mg total) by mouth 2 (two) times daily with meals. 60 tablet 2    cholecalciferol, vitamin D3, (VITAMIN D3) 50 mcg (2,000 unit) Cap Take 1 capsule (2,000 Units total) by mouth once daily. 90 capsule 1    citalopram (CELEXA) 10 MG tablet Take 1 tablet (10 mg total) by mouth every evening. 30 tablet 2    cyanocobalamin (VITAMIN B-12) 1000 MCG tablet Take 1 tablet (1,000 mcg total) by mouth once daily. 90 tablet 1    ferrous sulfate (FEOSOL) 325 mg (65 mg iron) Tab tablet Take 1 tablet (325 mg total) by mouth every other day. 45 tablet 1    folic acid (FOLVITE) 1 MG tablet Take 1 tablet (1,000 mcg total) by mouth once daily. 90 tablet 1    furosemide (LASIX) 20 MG tablet Take 2 tablets (40 mg total) by mouth once daily. (Patient taking differently: Take 20 mg by mouth 2 (two) times a day.) 60 tablet 0    gabapentin (NEURONTIN) 100 MG capsule Take 100 mg by mouth 2 (two) times daily.   1    HYDROcodone-acetaminophen (NORCO) 7.5-325 mg per tablet Take 1 tablet by mouth every 6 (six) hours as needed for Pain.      nystatin (MYCOSTATIN) powder Apply topically 4 (four) times daily. Apply 1 application topically 4 (four) times daily. 60 g 0    pantoprazole (PROTONIX) 40 MG tablet Take 1 tablet (40 mg total) by mouth once daily. 30 tablet 2    pravastatin (PRAVACHOL) 40 MG tablet Take 1 tablet (40 mg total) by mouth nightly. 30 tablet 2    [DISCONTINUED] cyclobenzaprine (FLEXERIL) 10 MG tablet Take 10 mg by mouth as needed. at bedtime.   1    [DISCONTINUED] FLUAD QUAD 2021-22,65Y UP,,PF, 60 mcg (15 mcg x 4)/0.5 mL Syrg        No current facility-administered medications on file prior to visit.     Social History     Socioeconomic History    Marital status:    Tobacco Use    Smoking status: Never Smoker    Smokeless tobacco: Never Used   Substance and Sexual Activity    Alcohol use: Not Currently    Drug use: Never    Sexual  activity: Not Currently     Partners: Male     Birth control/protection: Surgical     Family History   Problem Relation Age of Onset    Aneurysm Mother           Review of Systems   12 point review of systems per hpi, otherwise negative         Objective:    Nursing note and vitals reviewed.  Vitals:    04/12/22 0831   BP: 108/72   Pulse: 80   Resp: 18   Temp: 97.9 °F (36.6 °C)     Body mass index is 27.81 kg/m².     Physical Exam   Constitutional: SHE is oriented to person, place, and time. She appears well-developed and well-nourished. No distress.   HENT: WNL  Head: Normocephalic and atraumatic.   Eyes: Pupils are equal, round, and reactive to light. EOM are normal.   Neck: Normal range of motion. Neck supple.   Cardiovascular: Normal rate, regular rhythm, normal heart sounds and intact distal pulses.   No murmur heard.  Pulmonary/Chest: Effort normal and breath sounds normal. No respiratory distress. She has no wheezes.   Musculoskeletal: Normal range of motion. She exhibits +2 edema.   Neurological: She is alert and oriented to person, place, and time. No cranial nerve deficit.   Skin: Skin is warm and dry. Capillary refill takes less than 2 seconds.   Psychiatric: She has a normal mood and affect. Her behavior is normal.           Zoey Jackson MD    We Offer Telehealth & Same Day Appointments!   Book your Telehealth appointment with me through my nurse or   Clinic appointments on [facility]!  [phone]     To Schedule appointments online, go to [facility]: [email or web address]

## 2022-04-25 ENCOUNTER — OFFICE VISIT (OUTPATIENT)
Dept: CARDIOLOGY | Facility: CLINIC | Age: 87
End: 2022-04-25
Payer: MEDICARE

## 2022-04-25 ENCOUNTER — HOSPITAL ENCOUNTER (OUTPATIENT)
Dept: CARDIOLOGY | Facility: HOSPITAL | Age: 87
Discharge: HOME | End: 2022-04-25
Payer: MEDICARE

## 2022-04-25 VITALS
DIASTOLIC BLOOD PRESSURE: 98 MMHG | HEIGHT: 59 IN | OXYGEN SATURATION: 98 % | WEIGHT: 140.31 LBS | BODY MASS INDEX: 28.28 KG/M2 | SYSTOLIC BLOOD PRESSURE: 160 MMHG | HEART RATE: 78 BPM

## 2022-04-25 DIAGNOSIS — E78.2 MIXED HYPERLIPIDEMIA: Chronic | ICD-10-CM

## 2022-04-25 DIAGNOSIS — I48.20 CHRONIC ATRIAL FIBRILLATION: Primary | ICD-10-CM

## 2022-04-25 DIAGNOSIS — I48.20 CHRONIC ATRIAL FIBRILLATION: ICD-10-CM

## 2022-04-25 DIAGNOSIS — R60.0 BILATERAL LOWER EXTREMITY EDEMA: ICD-10-CM

## 2022-04-25 DIAGNOSIS — Z79.01 LONG TERM CURRENT USE OF ANTICOAGULANT THERAPY: Chronic | ICD-10-CM

## 2022-04-25 DIAGNOSIS — I27.20 PULMONARY HYPERTENSION: ICD-10-CM

## 2022-04-25 DIAGNOSIS — I48.20 CHRONIC ATRIAL FIBRILLATION: Primary | Chronic | ICD-10-CM

## 2022-04-25 PROCEDURE — 99999 PR PBB SHADOW E&M-EST. PATIENT-LVL III: CPT | Mod: PBBFAC,,, | Performed by: INTERNAL MEDICINE

## 2022-04-25 PROCEDURE — 99999 PR PBB SHADOW E&M-EST. PATIENT-LVL III: ICD-10-PCS | Mod: PBBFAC,,, | Performed by: INTERNAL MEDICINE

## 2022-04-25 PROCEDURE — 1126F AMNT PAIN NOTED NONE PRSNT: CPT | Mod: CPTII,S$GLB,, | Performed by: INTERNAL MEDICINE

## 2022-04-25 PROCEDURE — 1159F MED LIST DOCD IN RCRD: CPT | Mod: CPTII,S$GLB,, | Performed by: INTERNAL MEDICINE

## 2022-04-25 PROCEDURE — 93005 ELECTROCARDIOGRAM TRACING: CPT | Mod: PO

## 2022-04-25 PROCEDURE — 1126F PR PAIN SEVERITY QUANTIFIED, NO PAIN PRESENT: ICD-10-PCS | Mod: CPTII,S$GLB,, | Performed by: INTERNAL MEDICINE

## 2022-04-25 PROCEDURE — 1101F PT FALLS ASSESS-DOCD LE1/YR: CPT | Mod: CPTII,S$GLB,, | Performed by: INTERNAL MEDICINE

## 2022-04-25 PROCEDURE — 3288F PR FALLS RISK ASSESSMENT DOCUMENTED: ICD-10-PCS | Mod: CPTII,S$GLB,, | Performed by: INTERNAL MEDICINE

## 2022-04-25 PROCEDURE — 1160F RVW MEDS BY RX/DR IN RCRD: CPT | Mod: CPTII,S$GLB,, | Performed by: INTERNAL MEDICINE

## 2022-04-25 PROCEDURE — 1160F PR REVIEW ALL MEDS BY PRESCRIBER/CLIN PHARMACIST DOCUMENTED: ICD-10-PCS | Mod: CPTII,S$GLB,, | Performed by: INTERNAL MEDICINE

## 2022-04-25 PROCEDURE — 1159F PR MEDICATION LIST DOCUMENTED IN MEDICAL RECORD: ICD-10-PCS | Mod: CPTII,S$GLB,, | Performed by: INTERNAL MEDICINE

## 2022-04-25 PROCEDURE — 3288F FALL RISK ASSESSMENT DOCD: CPT | Mod: CPTII,S$GLB,, | Performed by: INTERNAL MEDICINE

## 2022-04-25 PROCEDURE — 1101F PR PT FALLS ASSESS DOC 0-1 FALLS W/OUT INJ PAST YR: ICD-10-PCS | Mod: CPTII,S$GLB,, | Performed by: INTERNAL MEDICINE

## 2022-04-25 PROCEDURE — 93010 ELECTROCARDIOGRAM REPORT: CPT | Mod: ,,, | Performed by: INTERNAL MEDICINE

## 2022-04-25 PROCEDURE — 93010 EKG 12-LEAD: ICD-10-PCS | Mod: ,,, | Performed by: INTERNAL MEDICINE

## 2022-04-25 PROCEDURE — 99214 OFFICE O/P EST MOD 30 MIN: CPT | Mod: S$GLB,,, | Performed by: INTERNAL MEDICINE

## 2022-04-25 PROCEDURE — 99214 PR OFFICE/OUTPT VISIT, EST, LEVL IV, 30-39 MIN: ICD-10-PCS | Mod: S$GLB,,, | Performed by: INTERNAL MEDICINE

## 2022-04-25 RX ORDER — NIFEDIPINE 30 MG/1
30 TABLET, EXTENDED RELEASE ORAL DAILY
COMMUNITY
Start: 2022-03-29 | End: 2022-07-25 | Stop reason: SDUPTHER

## 2022-04-25 RX ORDER — FUROSEMIDE 20 MG/1
20 TABLET ORAL 2 TIMES DAILY
Qty: 180 TABLET | Refills: 3 | Status: SHIPPED | OUTPATIENT
Start: 2022-04-25 | End: 2023-08-01

## 2022-04-25 NOTE — PROGRESS NOTES
Subjective:    Patient ID:  Miriam Oro is a 88 y.o. female who presents for evaluation of Follow-up      HPI88 yo WF with chronic AF with venous insufficiency. BP up today but she states better at home. Feels good and with leg elevation and support stockings edema much better.     Review of Systems   Constitutional: Negative for decreased appetite, fever, malaise/fatigue, weight gain and weight loss.   HENT: Negative for hearing loss and nosebleeds.    Eyes: Negative for visual disturbance.   Cardiovascular: Positive for irregular heartbeat and leg swelling. Negative for chest pain, claudication, cyanosis, dyspnea on exertion, near-syncope, orthopnea, palpitations, paroxysmal nocturnal dyspnea and syncope.   Respiratory: Negative for cough, hemoptysis, shortness of breath, sleep disturbances due to breathing, snoring and wheezing.    Endocrine: Negative for cold intolerance, heat intolerance, polydipsia and polyuria.   Hematologic/Lymphatic: Negative for adenopathy and bleeding problem. Does not bruise/bleed easily.   Skin: Negative for color change, itching, poor wound healing, rash and suspicious lesions.   Musculoskeletal: Negative for arthritis, back pain, falls, joint pain, joint swelling, muscle cramps, muscle weakness and myalgias.   Gastrointestinal: Negative for bloating, abdominal pain, change in bowel habit, constipation, flatus, heartburn, hematemesis, hematochezia, hemorrhoids, jaundice, melena, nausea and vomiting.   Genitourinary: Negative for bladder incontinence, decreased libido, frequency, hematuria, hesitancy and urgency.   Neurological: Negative for brief paralysis, difficulty with concentration, excessive daytime sleepiness, dizziness, focal weakness, headaches, light-headedness, loss of balance, numbness, vertigo and weakness.   Psychiatric/Behavioral: Negative for altered mental status, depression and memory loss. The patient does not have insomnia and is not nervous/anxious.   "  Allergic/Immunologic: Negative for environmental allergies, hives and persistent infections.        Objective:    Physical Exam  Vitals and nursing note reviewed.   Constitutional:       Appearance: She is well-developed.      Comments: BP (!) 160/98   Pulse 78   Ht 4' 11" (1.499 m)   Wt 63.6 kg (140 lb 4.8 oz)   SpO2 98%   BMI 28.34 kg/m²      HENT:      Head: Normocephalic and atraumatic.      Right Ear: External ear normal.      Left Ear: External ear normal.      Nose: Nose normal.   Eyes:      General: Lids are normal. No scleral icterus.        Right eye: No discharge.         Left eye: No discharge.      Conjunctiva/sclera: Conjunctivae normal.      Right eye: No hemorrhage.     Pupils: Pupils are equal, round, and reactive to light.   Neck:      Thyroid: No thyromegaly.      Vascular: No JVD.      Trachea: No tracheal deviation.   Cardiovascular:      Rate and Rhythm: Normal rate. Rhythm irregular.      Pulses: Intact distal pulses.      Heart sounds: Normal heart sounds. No murmur heard.    No friction rub. No gallop.   Pulmonary:      Effort: Pulmonary effort is normal. No respiratory distress.      Breath sounds: Normal breath sounds. No wheezing or rales.   Chest:      Chest wall: No tenderness.   Breasts: Breasts are symmetrical.       Abdominal:      General: Bowel sounds are normal. There is no distension.      Palpations: Abdomen is soft. There is no hepatomegaly or mass.      Tenderness: There is no abdominal tenderness. There is no guarding or rebound.   Musculoskeletal:         General: No tenderness. Normal range of motion.      Cervical back: Normal range of motion and neck supple.      Right lower leg: Edema present.      Left lower leg: Edema present.   Lymphadenopathy:      Cervical: No cervical adenopathy.   Skin:     General: Skin is warm and dry.      Coloration: Skin is not pale.      Findings: No erythema or rash.   Neurological:      Mental Status: She is alert and oriented to " person, place, and time.      Cranial Nerves: No cranial nerve deficit.      Coordination: Coordination normal.      Deep Tendon Reflexes: Reflexes normal.   Psychiatric:         Behavior: Behavior normal.         Thought Content: Thought content normal.         Judgment: Judgment normal.           Assessment:       1. Chronic atrial fibrillation    2. Pulmonary hypertension    3. Long term current use of anticoagulant therapy    4. Bilateral lower extremity edema    5. Mixed hyperlipidemia         Plan:     Cardiac status stable    Continue low salt, leg elevation and compression stockings    No orders of the defined types were placed in this encounter.    Follow up in about 6 months (around 10/25/2022).

## 2022-05-24 ENCOUNTER — OUTPATIENT CASE MANAGEMENT (OUTPATIENT)
Dept: ADMINISTRATIVE | Facility: OTHER | Age: 87
End: 2022-05-24
Payer: MEDICARE

## 2022-05-24 DIAGNOSIS — M85.89 OSTEOPENIA OF MULTIPLE SITES: ICD-10-CM

## 2022-05-24 RX ORDER — ALENDRONATE SODIUM 70 MG/1
70 TABLET ORAL
Qty: 12 TABLET | Refills: 1 | Status: SHIPPED | OUTPATIENT
Start: 2022-05-24 | End: 2022-07-25 | Stop reason: SDUPTHER

## 2022-05-24 NOTE — LETTER
May 25, 2022    Miriam Oro  [address] [place] [address]             [facility] [address] [place] [address] Dear Miriam Oro:    I am writing from the Outpatient Complex Care Management Department at [facility].  I received a referral from Zoey Jackson MD to contact you or your caregiver regarding any needs you may have. I have attempted to contact you or your cargeiver by phone two times unsuccessfully.  Please contact the Outpatient Complex Care Management Department at [phone] if you would like to discuss your needs.      Sincerely,         Aster Downing LMSW

## 2022-05-25 ENCOUNTER — TELEPHONE (OUTPATIENT)
Dept: FAMILY MEDICINE | Facility: CLINIC | Age: 87
End: 2022-05-25
Payer: MEDICARE

## 2022-05-25 NOTE — PROGRESS NOTES
2nd attempt    This LMSW attempted to reach patient/caregiver to provide resource and left msg requesting a return call.  Letter with contact information was sent via Patient Portal  to patient/caregiver.  Referral source notified.

## 2022-05-25 NOTE — TELEPHONE ENCOUNTER
----- Message from Aster Downing LMSW sent at 5/25/2022  1:09 PM CDT -----  This SW received a referral on the above patient. I have attempted to contact the patient or caregiver by phone two times unsuccessfully and mailed a letter with our contact information.    Thank you for the referral,    Aster Downing LMSW

## 2022-05-30 ENCOUNTER — PATIENT MESSAGE (OUTPATIENT)
Dept: FAMILY MEDICINE | Facility: CLINIC | Age: 87
End: 2022-05-30
Payer: MEDICARE

## 2022-06-24 DIAGNOSIS — E78.2 MIXED HYPERLIPIDEMIA: Chronic | ICD-10-CM

## 2022-06-24 RX ORDER — PRAVASTATIN SODIUM 40 MG/1
40 TABLET ORAL NIGHTLY
Qty: 30 TABLET | Refills: 3 | Status: SHIPPED | OUTPATIENT
Start: 2022-06-24 | End: 2022-12-09

## 2022-06-24 NOTE — TELEPHONE ENCOUNTER
----- Message from Nikki Rodriguez sent at 6/24/2022  1:38 PM CDT -----  Contact: Amaya/grand daughter  Type:  RX Refill Request    Who Called: Amaya  Refill or New Rx: refill   RX Name and Strength: Pravastatin (PRAVACHOL) 40 MG  How is the patient currently taking it? (ex. 1XDay): 1xday  Is this a 30 day or 90 day RX: 30  Preferred Pharmacy with phone number:   [facility] [address]  [address] [facility] [address]  Phone: [phone] Fax: [phone]  Local or Mail Order: Local  Ordering Provider:   Would the patient rather a call back or a response via [facility] [employer]? Call back   Best Call Back Number: Please call her at [phone]  Additional Information:     Type:  RX Refill Request    Who Called: Amaya  Refill or New Rx: refill   RX Name and Strength: Pantoprazole (PROTONIX) 40 MG  How is the patient currently taking it? (ex. 1XDay): 1xday  Is this a 30 day or 90 day RX: 30  Preferred Pharmacy with phone number:   [place], [place] - [address] [facility]  [address] [facility] [address]  Phone: [phone] Fax: [phone]  Local or Mail Order: Local  Ordering Provider:   Would the patient rather a call back or a response via [employer]? Call back   Best Call Back Number: Please call her at [phone]  Additional Information:     Type:  RX Refill Request    Who Called: Amaya  Refill or New Rx: refill   RX Name and Strength: CarvediloL (COREG) 12.5 MG  How is the patient currently taking it? (ex. 1XDay): 2xday  Is this a 30 day or 90 day RX: 30  Preferred Pharmacy with phone number:   [facility] [place] - [address]  [address] [facility] [address]  Phone: [phone] Fax: [phone]  Local or Mail Order: Local  Ordering Provider:   Would the patient rather a call back or a response via [facility]? Call back   Best Call Back Number: Please call her at [phone]  Additional  Information:     Type:  RX Refill Request    Who Called: Amaya  Refill or New Rx: refill   RX Name and Strength: Cyanocobalamin (VITAMIN B-12) 1000 MCG  How is the patient currently taking it? (ex. 1XDay): 1xday  Is this a 30 day or 90 day RX: 30  Preferred Pharmacy with phone number:   [place], [place] - [address]   [address] [facility] [address]  Phone: [phone] Fax: [phone]  Local or Mail Order: Local  Ordering Provider:   Would the patient rather a call back or a response via [facility]? Call back   Best Call Back Number: Please call her at [phone]  Additional Information:       Type:  RX Refill Request    Who Called: Amaya  Refill or New Rx: refill   RX Name and Strength: Nifedipine (PROCARDIA-XL) 30 MG (OSM) 24 hr  How is the patient currently taking it? (ex. 1XDay): 1xday  Is this a 30 day or 90 day RX: 30  Preferred Pharmacy with phone number:   [place], [place] - [address] [facility]  [address] [facility] [address]  Phone: [phone] Fax: [phone]  Local or Mail Order: Local  Ordering Provider:   Would the patient rather a call back or a response via [facility]? Call back   Best Call Back Number: Please call her at [phone]  Additional Information:

## 2022-07-25 DIAGNOSIS — F41.9 ANXIETY: ICD-10-CM

## 2022-07-25 DIAGNOSIS — K21.9 GASTROESOPHAGEAL REFLUX DISEASE WITHOUT ESOPHAGITIS: ICD-10-CM

## 2022-07-25 DIAGNOSIS — Z86.39 HISTORY OF NON ANEMIC VITAMIN B12 DEFICIENCY: ICD-10-CM

## 2022-07-25 DIAGNOSIS — M85.89 OSTEOPENIA OF MULTIPLE SITES: ICD-10-CM

## 2022-07-25 DIAGNOSIS — N18.31 CKD STAGE G3A/A1, GFR 45-59 AND ALBUMIN CREATININE RATIO <30 MG/G: ICD-10-CM

## 2022-07-25 DIAGNOSIS — I48.20 CHRONIC ATRIAL FIBRILLATION: ICD-10-CM

## 2022-07-25 DIAGNOSIS — D75.89 MACROCYTOSIS WITHOUT ANEMIA: ICD-10-CM

## 2022-07-25 RX ORDER — BUSPIRONE HYDROCHLORIDE 5 MG/1
5 TABLET ORAL 2 TIMES DAILY
Qty: 60 TABLET | Refills: 2 | Status: SHIPPED | OUTPATIENT
Start: 2022-07-25 | End: 2022-11-05 | Stop reason: SDUPTHER

## 2022-07-25 RX ORDER — LANOLIN ALCOHOL/MO/W.PET/CERES
1000 CREAM (GRAM) TOPICAL DAILY
Qty: 90 TABLET | Refills: 1 | Status: SHIPPED | OUTPATIENT
Start: 2022-07-25 | End: 2024-01-22 | Stop reason: SDUPTHER

## 2022-07-25 RX ORDER — PANTOPRAZOLE SODIUM 40 MG/1
40 TABLET, DELAYED RELEASE ORAL DAILY
Qty: 30 TABLET | Refills: 2 | Status: SHIPPED | OUTPATIENT
Start: 2022-07-25 | End: 2022-11-05 | Stop reason: SDUPTHER

## 2022-07-25 RX ORDER — NIFEDIPINE 30 MG/1
30 TABLET, EXTENDED RELEASE ORAL DAILY
Qty: 30 TABLET | Refills: 3 | Status: SHIPPED | OUTPATIENT
Start: 2022-07-25 | End: 2024-01-22 | Stop reason: SDUPTHER

## 2022-07-25 RX ORDER — ACETAMINOPHEN 500 MG
2000 TABLET ORAL DAILY
Qty: 90 CAPSULE | Refills: 1 | Status: SHIPPED | OUTPATIENT
Start: 2022-07-25 | End: 2024-01-22 | Stop reason: SDUPTHER

## 2022-07-25 RX ORDER — CITALOPRAM 10 MG/1
10 TABLET ORAL NIGHTLY
Qty: 30 TABLET | Refills: 2 | Status: SHIPPED | OUTPATIENT
Start: 2022-07-25 | End: 2022-11-05 | Stop reason: SDUPTHER

## 2022-07-25 RX ORDER — ALENDRONATE SODIUM 70 MG/1
70 TABLET ORAL
Qty: 12 TABLET | Refills: 1 | Status: SHIPPED | OUTPATIENT
Start: 2022-07-25 | End: 2023-06-14

## 2022-07-25 RX ORDER — FERROUS SULFATE 325(65) MG
325 TABLET ORAL EVERY OTHER DAY
Qty: 45 TABLET | Refills: 1 | Status: SHIPPED | OUTPATIENT
Start: 2022-07-25

## 2022-07-25 RX ORDER — CARVEDILOL 12.5 MG/1
12.5 TABLET ORAL 2 TIMES DAILY WITH MEALS
Qty: 60 TABLET | Refills: 2 | Status: SHIPPED | OUTPATIENT
Start: 2022-07-25 | End: 2022-11-05 | Stop reason: SDUPTHER

## 2022-07-25 NOTE — TELEPHONE ENCOUNTER
----- Message from Puneet Aquino sent at 7/25/2022  9:10 AM CDT -----  Contact: Amaya  Amaya ( granddaughter) would like to consult with nurse regarding medications and refills.  Please contact Amaya @ [phone].  Thanks/As

## 2022-09-29 ENCOUNTER — PATIENT OUTREACH (OUTPATIENT)
Dept: ADMINISTRATIVE | Facility: HOSPITAL | Age: 87
End: 2022-09-29
Payer: MEDICARE

## 2022-09-30 ENCOUNTER — OFFICE VISIT (OUTPATIENT)
Dept: FAMILY MEDICINE | Facility: CLINIC | Age: 87
End: 2022-09-30
Payer: MEDICARE

## 2022-09-30 ENCOUNTER — HOSPITAL ENCOUNTER (OUTPATIENT)
Dept: RADIOLOGY | Facility: HOSPITAL | Age: 87
Discharge: HOME | End: 2022-09-30
Attending: STUDENT IN AN ORGANIZED HEALTH CARE EDUCATION/TRAINING PROGRAM
Payer: MEDICARE

## 2022-09-30 VITALS
TEMPERATURE: 98 F | HEIGHT: 59 IN | RESPIRATION RATE: 18 BRPM | SYSTOLIC BLOOD PRESSURE: 112 MMHG | DIASTOLIC BLOOD PRESSURE: 70 MMHG | HEART RATE: 60 BPM | BODY MASS INDEX: 26.13 KG/M2 | WEIGHT: 129.63 LBS

## 2022-09-30 DIAGNOSIS — J18.9 PNEUMONIA DUE TO INFECTIOUS ORGANISM, UNSPECIFIED LATERALITY, UNSPECIFIED PART OF LUNG: Primary | ICD-10-CM

## 2022-09-30 DIAGNOSIS — J18.9 PNEUMONIA DUE TO INFECTIOUS ORGANISM, UNSPECIFIED LATERALITY, UNSPECIFIED PART OF LUNG: ICD-10-CM

## 2022-09-30 DIAGNOSIS — Z23 IMMUNIZATION DUE: ICD-10-CM

## 2022-09-30 PROCEDURE — 3288F FALL RISK ASSESSMENT DOCD: CPT | Mod: CPTII,S$GLB,, | Performed by: STUDENT IN AN ORGANIZED HEALTH CARE EDUCATION/TRAINING PROGRAM

## 2022-09-30 PROCEDURE — 99999 PR PBB SHADOW E&M-EST. PATIENT-LVL V: CPT | Mod: PBBFAC,,, | Performed by: STUDENT IN AN ORGANIZED HEALTH CARE EDUCATION/TRAINING PROGRAM

## 2022-09-30 PROCEDURE — 1159F MED LIST DOCD IN RCRD: CPT | Mod: CPTII,S$GLB,, | Performed by: STUDENT IN AN ORGANIZED HEALTH CARE EDUCATION/TRAINING PROGRAM

## 2022-09-30 PROCEDURE — G0008 ADMIN INFLUENZA VIRUS VAC: HCPCS | Mod: S$GLB,,, | Performed by: STUDENT IN AN ORGANIZED HEALTH CARE EDUCATION/TRAINING PROGRAM

## 2022-09-30 PROCEDURE — G0008 FLU VACCINE - QUADRIVALENT - ADJUVANTED: ICD-10-PCS | Mod: S$GLB,,, | Performed by: STUDENT IN AN ORGANIZED HEALTH CARE EDUCATION/TRAINING PROGRAM

## 2022-09-30 PROCEDURE — 1159F PR MEDICATION LIST DOCUMENTED IN MEDICAL RECORD: ICD-10-PCS | Mod: CPTII,S$GLB,, | Performed by: STUDENT IN AN ORGANIZED HEALTH CARE EDUCATION/TRAINING PROGRAM

## 2022-09-30 PROCEDURE — 99214 OFFICE O/P EST MOD 30 MIN: CPT | Mod: S$GLB,,, | Performed by: STUDENT IN AN ORGANIZED HEALTH CARE EDUCATION/TRAINING PROGRAM

## 2022-09-30 PROCEDURE — 1101F PR PT FALLS ASSESS DOC 0-1 FALLS W/OUT INJ PAST YR: ICD-10-PCS | Mod: CPTII,S$GLB,, | Performed by: STUDENT IN AN ORGANIZED HEALTH CARE EDUCATION/TRAINING PROGRAM

## 2022-09-30 PROCEDURE — 1101F PT FALLS ASSESS-DOCD LE1/YR: CPT | Mod: CPTII,S$GLB,, | Performed by: STUDENT IN AN ORGANIZED HEALTH CARE EDUCATION/TRAINING PROGRAM

## 2022-09-30 PROCEDURE — 99214 PR OFFICE/OUTPT VISIT, EST, LEVL IV, 30-39 MIN: ICD-10-PCS | Mod: S$GLB,,, | Performed by: STUDENT IN AN ORGANIZED HEALTH CARE EDUCATION/TRAINING PROGRAM

## 2022-09-30 PROCEDURE — 1111F PR DISCHARGE MEDS RECONCILED W/ CURRENT OUTPATIENT MED LIST: ICD-10-PCS | Mod: CPTII,S$GLB,, | Performed by: STUDENT IN AN ORGANIZED HEALTH CARE EDUCATION/TRAINING PROGRAM

## 2022-09-30 PROCEDURE — 99999 PR PBB SHADOW E&M-EST. PATIENT-LVL V: ICD-10-PCS | Mod: PBBFAC,,, | Performed by: STUDENT IN AN ORGANIZED HEALTH CARE EDUCATION/TRAINING PROGRAM

## 2022-09-30 PROCEDURE — 71046 X-RAY EXAM CHEST 2 VIEWS: CPT | Mod: 26,,, | Performed by: RADIOLOGY

## 2022-09-30 PROCEDURE — 90694 VACC AIIV4 NO PRSRV 0.5ML IM: CPT | Mod: S$GLB,,, | Performed by: STUDENT IN AN ORGANIZED HEALTH CARE EDUCATION/TRAINING PROGRAM

## 2022-09-30 PROCEDURE — 1111F DSCHRG MED/CURRENT MED MERGE: CPT | Mod: CPTII,S$GLB,, | Performed by: STUDENT IN AN ORGANIZED HEALTH CARE EDUCATION/TRAINING PROGRAM

## 2022-09-30 PROCEDURE — 1126F PR PAIN SEVERITY QUANTIFIED, NO PAIN PRESENT: ICD-10-PCS | Mod: CPTII,S$GLB,, | Performed by: STUDENT IN AN ORGANIZED HEALTH CARE EDUCATION/TRAINING PROGRAM

## 2022-09-30 PROCEDURE — 71046 XR CHEST PA AND LATERAL: ICD-10-PCS | Mod: 26,,, | Performed by: RADIOLOGY

## 2022-09-30 PROCEDURE — 90694 FLU VACCINE - QUADRIVALENT - ADJUVANTED: ICD-10-PCS | Mod: S$GLB,,, | Performed by: STUDENT IN AN ORGANIZED HEALTH CARE EDUCATION/TRAINING PROGRAM

## 2022-09-30 PROCEDURE — 1126F AMNT PAIN NOTED NONE PRSNT: CPT | Mod: CPTII,S$GLB,, | Performed by: STUDENT IN AN ORGANIZED HEALTH CARE EDUCATION/TRAINING PROGRAM

## 2022-09-30 PROCEDURE — 3288F PR FALLS RISK ASSESSMENT DOCUMENTED: ICD-10-PCS | Mod: CPTII,S$GLB,, | Performed by: STUDENT IN AN ORGANIZED HEALTH CARE EDUCATION/TRAINING PROGRAM

## 2022-09-30 PROCEDURE — 71046 X-RAY EXAM CHEST 2 VIEWS: CPT | Mod: TC,PO

## 2022-09-30 RX ORDER — CYCLOBENZAPRINE HCL 10 MG
10 TABLET ORAL NIGHTLY PRN
COMMUNITY
Start: 2022-07-25

## 2022-09-30 RX ORDER — FLUTICASONE PROPIONATE AND SALMETEROL 100; 50 UG/1; UG/1
1 POWDER RESPIRATORY (INHALATION) 2 TIMES DAILY
COMMUNITY
End: 2024-01-22

## 2022-09-30 NOTE — PROGRESS NOTES
"Problem List Items Addressed This Visit    None  Visit Diagnoses       Pneumonia due to infectious organism, unspecified laterality, unspecified part of lung    -  Primary    Relevant Orders    X-Ray Chest PA And Lateral (Completed)    Immunization due        Relevant Orders    Influenza - Quadrivalent (Adjuvanted) (Completed)                Patient ID: Miriam Oro is a 89 y.o. female.    Chief Complaint:  follow up    Has a hx of  has a past medical history of Age-related osteoporosis without current pathological fracture (8/13/2018), Chronic atrial fibrillation (8/13/2018), Chronic diastolic heart failure (8/13/2018), Elevated brain natriuretic peptide (BNP) level (6/19/2020), Elevated uric acid in blood (5/6/2021), GIB (gastrointestinal bleeding) (5/30/2020), Macrocytosis without anemia (9/23/2020), Mixed hyperlipidemia (8/13/2018), Osteopenia of multiple sites (12/9/2019), Prediabetes (12/9/2019), RA (rheumatoid arthritis), Secondary hyperparathyroidism (5/6/2021), Tricuspid valve regurgitation (9/19/2018), Vitamin B12 deficiency (dietary) anemia (12/9/2019), and Vitamin D insufficiency (12/9/2019).       Recently dcd from hospital for acute bronchitis. She has been doing well since discharge. Reports feeling well. No concerns. Taking all meds as prescribed. Denies fevers, chills, chest pain, SOB, fatigue, abdominal pain, nausea, vomiting, dysuria, hematuria, hematochezia, or melena.     Per chart review:  "[facility] 6 Minute Walk Evaluation    Resting Data 1 2 3 4 5 6 Recovery Data   Heart Rate 82 98 101 102 105 107 100 85   Blood Pressure  -- -- -- -- -- -- -- --   Respiratory Rate 18 18 20 20 22 22 20 18   Oxygen Saturation 94 93 92 92 92 90 91 93       Fall at home  - Denies syncope/LOC. Appears to be mechanical fall  -Borderline blood pressure with multiple antihypertensives. Check orthostatics hold antihypertensive medications.  - PT/OT eval and treat    RLL PNA  Recent URI, chronic cough  - Went to walk in " clinic in [place] in July and was treated with Doxycycline and steroids. She reports similar symptoms but reports they were much worse than what chronic lingering symptoms she has today.  -Chest x-ray compared to prior in July with more infiltrate in the right lower lobe with more blurring of the right heart border per my read.  - Started on Rocephin and Azithromycin in ED.   -CT chest for further evaluation    Chronic heart failure with preserved ejection fraction  -Patient follows with outside cardiologist  -Elevated , with questionable edema on chest x-ray  - Last echo from 6/2020 EF 55 to 60% with increased left atrial pressure. Severely dilated left atrium and right atrium. Trace MR, moderate to severe TR RVSP elevated 50-60 mmHg  -We will defer diuresis today follow-up on echo.    Prediabetes  Hyperglycemia:  -Recent hemoglobin A1c from 1/8/2020 20-6.1  -Mild elevation in glucose at 149 POA.  -We will monitor on labs and if necessary start Accu-Cheks and sliding scale.     Hypocalcemia:  -Ca 8.5 on admit with slight correction near borderline low normal  -Continue calcium vitamin D supplement  -Monitor      CKD IIIA  -Cr 1.42 on admit, near baseline 1-1.2  -Avoid nephrotoxic agents   -Continue to monitor with daily labs     Hypertension  -Hold home BP meds due to synopal event and borderline BP. Continue vitals and will adjust as necessary.     Hypokalemia  - K 3.4 POA. Replete now and monitor with further adjustment as necessary.     Normocytic Anemia  - H&H 11.3/32.8  - No indicatin for transfusion at this time. No evidence of bleeding. Monitor.    Atrial fibrillation  Chronic anticoagulation   -Continue home Coreg and eliquis.   -EKG demonstrates atrial fibrillation rate controlled.     Hyponatremia: Mild  -Na 134 on admit will monitor.     GERD  -Continue home protonix.      Overweight:  - Body mass index is 28.28 kg/m². Recommended pt follow up with PCP for diet, exercise and lifestyle changes  "focused on weight loss.    Disposition: Place in observation we will reevaluate with echo there are concerns of mild heart failure exacerbation. CT for further evaluation of lingering cough and right lower lobe infiltrate. Continue antibiotics. Monitor cultures."       Health Maintenance Topics with due status: Not Due       Topic Last Completion Date    DEXA Scan 11/02/2020    Lipid Panel 02/10/2022        ==============================================  History reviewed.   Health Maintenance Due   Topic Date Due    COVID-19 Vaccine (1) Never done       Past Medical History:  Past Medical History:   Diagnosis Date    Age-related osteoporosis without current pathological fracture 8/13/2018    Chronic atrial fibrillation 8/13/2018    Chronic diastolic heart failure 8/13/2018    Elevated brain natriuretic peptide (BNP) level 6/19/2020    Elevated uric acid in blood 5/6/2021    GIB (gastrointestinal bleeding) 5/30/2020    Macrocytosis without anemia 9/23/2020    Mixed hyperlipidemia 8/13/2018    Osteopenia of multiple sites 12/9/2019    Prediabetes 12/9/2019    RA (rheumatoid arthritis)     Secondary hyperparathyroidism 5/6/2021    Tricuspid valve regurgitation 9/19/2018    Vitamin B12 deficiency (dietary) anemia 12/9/2019    Vitamin D insufficiency 12/9/2019     Past Surgical History:   Procedure Laterality Date    TOTAL ABDOMINAL HYSTERECTOMY W/ BILATERAL SALPINGOOPHORECTOMY       Review of patient's allergies indicates:   Allergen Reactions    Meloxicam Other (See Comments)     Decreases renal function    Nitrofurantoin monohyd/m-cryst Itching     Current Outpatient Medications on File Prior to Visit   Medication Sig Dispense Refill    alendronate (FOSAMAX) 70 MG tablet Take 1 tablet (70 mg total) by mouth every 7 days. 12 tablet 1    busPIRone (BUSPAR) 5 MG Tab Take 1 tablet (5 mg total) by mouth 2 (two) times daily. 60 tablet 2    carvediloL (COREG) 12.5 MG tablet Take 1 tablet (12.5 mg total) by mouth 2 (two) " times daily with meals. 60 tablet 2    cholecalciferol, vitamin D3, (VITAMIN D3) 50 mcg (2,000 unit) Cap capsule Take 1 capsule (2,000 Units total) by mouth once daily. 90 capsule 1    citalopram (CELEXA) 10 MG tablet Take 1 tablet (10 mg total) by mouth every evening. 30 tablet 2    cyanocobalamin (VITAMIN B-12) 1000 MCG tablet Take 1 tablet (1,000 mcg total) by mouth once daily. 90 tablet 1    cyclobenzaprine (FLEXERIL) 10 MG tablet Take 10 mg by mouth nightly as needed.      ELIQUIS 2.5 mg Tab TAKE ONE TABLET BY MOUTH TWICE DAILY 60 tablet 2    ferrous sulfate (FEOSOL) 325 mg (65 mg iron) Tab tablet Take 1 tablet (325 mg total) by mouth every other day. 45 tablet 1    fluticasone-salmeterol diskus inhaler 100-50 mcg Inhale 1 puff into the lungs 2 (two) times daily. Controller      folic acid (FOLVITE) 1 MG tablet Take 1 tablet (1,000 mcg total) by mouth once daily. 90 tablet 1    furosemide (LASIX) 20 MG tablet Take 1 tablet (20 mg total) by mouth 2 (two) times a day. 180 tablet 3    gabapentin (NEURONTIN) 100 MG capsule Take 100 mg by mouth 2 (two) times daily.   1    HYDROcodone-acetaminophen (NORCO) 7.5-325 mg per tablet Take 1 tablet by mouth every 6 (six) hours as needed for Pain.      NIFEdipine (PROCARDIA-XL) 30 MG (OSM) 24 hr tablet Take 1 tablet (30 mg total) by mouth once daily. 30 tablet 3    nystatin (MYCOSTATIN) powder Apply topically 4 (four) times daily. Apply 1 application topically 4 (four) times daily. 60 g 0    pantoprazole (PROTONIX) 40 MG tablet Take 1 tablet (40 mg total) by mouth once daily. 30 tablet 2    pravastatin (PRAVACHOL) 40 MG tablet Take 1 tablet (40 mg total) by mouth nightly. 30 tablet 3     No current facility-administered medications on file prior to visit.     Social History     Socioeconomic History    Marital status:    Tobacco Use    Smoking status: Never    Smokeless tobacco: Never   Substance and Sexual Activity    Alcohol use: Not Currently    Drug use: Never     Sexual activity: Not Currently     Partners: Male     Birth control/protection: Surgical     Family History   Problem Relation Age of Onset    Aneurysm Mother           Review of Systems   12 point review of systems per hpi, otherwise negative         Objective:    Nursing note and vitals reviewed.  Vitals:    09/30/22 1325   BP: 112/70   Pulse: 60   Resp: 18   Temp: 97.9 °F (36.6 °C)   Satting 92% on RA  Body mass index is 26.18 kg/m².     Physical Exam   Constitutional: SHE is oriented to person, place, and time. She appears well-developed and well-nourished. No distress.   HENT: WNL  Head: Normocephalic and atraumatic.   Eyes: Pupils are equal, round, and reactive to light. EOM are normal.   Neck: Normal range of motion. Neck supple.   Cardiovascular: Normal rate, regular rhythm, normal heart sounds and intact distal pulses.   No murmur heard.  Pulmonary/Chest: Effort normal and breath sounds normal. No respiratory distress. She has no wheezes.   Musculoskeletal: Normal range of motion. She exhibits +1 edema to bilateral ankles  Neurological: She is alert and oriented to person, place, and time. No cranial nerve deficit.   Skin: Skin is warm and dry. Capillary refill takes less than 2 seconds.   Psychiatric: She has a normal mood and affect. Her behavior is normal.           Zoey Jackson MD    We Offer Telehealth & Same Day Appointments!   Book your Telehealth appointment with me through my nurse or   Clinic appointments on [employer]!  [phone]     To Schedule appointments online, go to [email or web address]: [email or web address]       Transitional Care Note    Family and/or Caretaker present at visit?  Yes.  Diagnostic tests reviewed/disposition: will need repeat CXR   Disease/illness education: PNA  Home health/community services discussion/referrals: Patient does not have home health established from hospital visit.  They do not need home health.  If needed, we will set up home health  for the patient.   Establishment or re-establishment of referral orders for community resources: No other necessary community resources.   Discussion with other health care providers: No discussion with other health care providers necessary.

## 2022-10-31 ENCOUNTER — OFFICE VISIT (OUTPATIENT)
Dept: CARDIOLOGY | Facility: CLINIC | Age: 87
End: 2022-10-31
Payer: MEDICARE

## 2022-10-31 VITALS
OXYGEN SATURATION: 96 % | BODY MASS INDEX: 27.38 KG/M2 | HEART RATE: 78 BPM | HEIGHT: 59 IN | DIASTOLIC BLOOD PRESSURE: 64 MMHG | SYSTOLIC BLOOD PRESSURE: 118 MMHG | WEIGHT: 135.81 LBS

## 2022-10-31 DIAGNOSIS — I87.2 VENOUS INSUFFICIENCY: ICD-10-CM

## 2022-10-31 DIAGNOSIS — N18.30 BENIGN HYPERTENSIVE HEART AND KIDNEY DISEASE WITH CHF, NYHA CLASS 1 AND CKD STAGE 3: Chronic | ICD-10-CM

## 2022-10-31 DIAGNOSIS — I13.0 BENIGN HYPERTENSIVE HEART AND KIDNEY DISEASE WITH CHF, NYHA CLASS 1 AND CKD STAGE 3: Chronic | ICD-10-CM

## 2022-10-31 DIAGNOSIS — I48.20 CHRONIC ATRIAL FIBRILLATION: Primary | Chronic | ICD-10-CM

## 2022-10-31 PROBLEM — I50.32 CHRONIC DIASTOLIC HEART FAILURE: Chronic | Status: RESOLVED | Noted: 2018-08-13 | Resolved: 2022-10-31

## 2022-10-31 PROCEDURE — 99999 PR PBB SHADOW E&M-EST. PATIENT-LVL III: CPT | Mod: PBBFAC,,, | Performed by: INTERNAL MEDICINE

## 2022-10-31 PROCEDURE — 3288F PR FALLS RISK ASSESSMENT DOCUMENTED: ICD-10-PCS | Mod: CPTII,S$GLB,, | Performed by: INTERNAL MEDICINE

## 2022-10-31 PROCEDURE — 1101F PT FALLS ASSESS-DOCD LE1/YR: CPT | Mod: CPTII,S$GLB,, | Performed by: INTERNAL MEDICINE

## 2022-10-31 PROCEDURE — 1160F RVW MEDS BY RX/DR IN RCRD: CPT | Mod: CPTII,S$GLB,, | Performed by: INTERNAL MEDICINE

## 2022-10-31 PROCEDURE — 1126F PR PAIN SEVERITY QUANTIFIED, NO PAIN PRESENT: ICD-10-PCS | Mod: CPTII,S$GLB,, | Performed by: INTERNAL MEDICINE

## 2022-10-31 PROCEDURE — 1101F PR PT FALLS ASSESS DOC 0-1 FALLS W/OUT INJ PAST YR: ICD-10-PCS | Mod: CPTII,S$GLB,, | Performed by: INTERNAL MEDICINE

## 2022-10-31 PROCEDURE — 1126F AMNT PAIN NOTED NONE PRSNT: CPT | Mod: CPTII,S$GLB,, | Performed by: INTERNAL MEDICINE

## 2022-10-31 PROCEDURE — 99999 PR PBB SHADOW E&M-EST. PATIENT-LVL III: ICD-10-PCS | Mod: PBBFAC,,, | Performed by: INTERNAL MEDICINE

## 2022-10-31 PROCEDURE — 1159F MED LIST DOCD IN RCRD: CPT | Mod: CPTII,S$GLB,, | Performed by: INTERNAL MEDICINE

## 2022-10-31 PROCEDURE — 99214 OFFICE O/P EST MOD 30 MIN: CPT | Mod: S$GLB,,, | Performed by: INTERNAL MEDICINE

## 2022-10-31 PROCEDURE — 3288F FALL RISK ASSESSMENT DOCD: CPT | Mod: CPTII,S$GLB,, | Performed by: INTERNAL MEDICINE

## 2022-10-31 PROCEDURE — 1159F PR MEDICATION LIST DOCUMENTED IN MEDICAL RECORD: ICD-10-PCS | Mod: CPTII,S$GLB,, | Performed by: INTERNAL MEDICINE

## 2022-10-31 PROCEDURE — 1160F PR REVIEW ALL MEDS BY PRESCRIBER/CLIN PHARMACIST DOCUMENTED: ICD-10-PCS | Mod: CPTII,S$GLB,, | Performed by: INTERNAL MEDICINE

## 2022-10-31 PROCEDURE — 99214 PR OFFICE/OUTPT VISIT, EST, LEVL IV, 30-39 MIN: ICD-10-PCS | Mod: S$GLB,,, | Performed by: INTERNAL MEDICINE

## 2022-10-31 NOTE — PROGRESS NOTES
"Subjective:    Patient ID:  Miriam Oro is a 89 y.o. female who presents for follow-up of Follow-up      HPI89 yo WF with chronic AF, HTN, HLD and venous insufficiency. Denies chest pain, SOB, and  edema stable. She remains active with no symptoms.    Review of Systems   Cardiovascular:  Positive for irregular heartbeat and leg swelling. Negative for chest pain, claudication, cyanosis, dyspnea on exertion, near-syncope, orthopnea, palpitations, paroxysmal nocturnal dyspnea and syncope.      Objective:    Physical Exam  Vitals and nursing note reviewed.   Constitutional:       Appearance: She is well-developed.      Comments: /64   Pulse 78   Ht 4' 11" (1.499 m)   Wt 61.6 kg (135 lb 12.8 oz)   SpO2 96%   BMI 27.43 kg/m²      HENT:      Head: Normocephalic and atraumatic.      Right Ear: External ear normal.      Left Ear: External ear normal.      Nose: Nose normal.   Eyes:      General: Lids are normal. No scleral icterus.        Right eye: No discharge.         Left eye: No discharge.      Conjunctiva/sclera: Conjunctivae normal.      Right eye: No hemorrhage.     Pupils: Pupils are equal, round, and reactive to light.   Neck:      Thyroid: No thyromegaly.      Vascular: No JVD.      Trachea: No tracheal deviation.   Cardiovascular:      Rate and Rhythm: Normal rate. Rhythm irregularly irregular.      Pulses: Intact distal pulses.      Heart sounds: Normal heart sounds. No murmur heard.    No friction rub. No gallop.   Pulmonary:      Effort: Pulmonary effort is normal. No respiratory distress.      Breath sounds: Normal breath sounds. No wheezing or rales.   Chest:      Chest wall: No tenderness.   Breasts:     Breasts are symmetrical.   Abdominal:      General: Bowel sounds are normal. There is no distension.      Palpations: Abdomen is soft. There is no hepatomegaly or mass.      Tenderness: There is no abdominal tenderness. There is no guarding or rebound.   Musculoskeletal:         General: No " tenderness. Normal range of motion.      Cervical back: Normal range of motion and neck supple.      Right lower leg: Edema present.      Left lower leg: Edema present.   Lymphadenopathy:      Cervical: No cervical adenopathy.   Skin:     General: Skin is warm and dry.      Coloration: Skin is not pale.      Findings: No erythema or rash.   Neurological:      Mental Status: She is alert and oriented to person, place, and time.      Cranial Nerves: No cranial nerve deficit.      Coordination: Coordination normal.      Deep Tendon Reflexes: Reflexes are normal and symmetric. Reflexes normal.   Psychiatric:         Behavior: Behavior normal.         Thought Content: Thought content normal.         Judgment: Judgment normal.         Assessment:       1. Chronic atrial fibrillation    2. 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 Venous insufficiency         Plan:     The current medical regimen is effective;  continue present plan and medications.     Low salt    Risk of stroke from atrial fib has been discussed as well as bleeding risk from alternative anticoagulation regiments as well as risk and benefits of rate control vs cardioversion     No orders of the defined types were placed in this encounter.    Follow up in about 6 months (around 4/30/2023).

## 2022-11-05 DIAGNOSIS — I48.20 CHRONIC ATRIAL FIBRILLATION: ICD-10-CM

## 2022-11-05 DIAGNOSIS — F41.9 ANXIETY: ICD-10-CM

## 2022-11-05 DIAGNOSIS — K21.9 GASTROESOPHAGEAL REFLUX DISEASE WITHOUT ESOPHAGITIS: ICD-10-CM

## 2022-11-05 RX ORDER — CITALOPRAM 10 MG/1
10 TABLET ORAL NIGHTLY
Qty: 30 TABLET | Refills: 2 | Status: SHIPPED | OUTPATIENT
Start: 2022-11-05 | End: 2023-01-28

## 2022-11-05 RX ORDER — CARVEDILOL 12.5 MG/1
12.5 TABLET ORAL 2 TIMES DAILY WITH MEALS
Qty: 60 TABLET | Refills: 2 | Status: SHIPPED | OUTPATIENT
Start: 2022-11-05 | End: 2023-01-28

## 2022-11-05 RX ORDER — PANTOPRAZOLE SODIUM 40 MG/1
40 TABLET, DELAYED RELEASE ORAL DAILY
Qty: 30 TABLET | Refills: 2 | Status: SHIPPED | OUTPATIENT
Start: 2022-11-05 | End: 2023-01-28

## 2022-11-05 RX ORDER — BUSPIRONE HYDROCHLORIDE 5 MG/1
5 TABLET ORAL 2 TIMES DAILY
Qty: 60 TABLET | Refills: 2 | Status: SHIPPED | OUTPATIENT
Start: 2022-11-05 | End: 2023-01-30

## 2022-11-05 NOTE — TELEPHONE ENCOUNTER
----- Message from Rene Hoskins sent at 11/5/2022  8:36 AM CDT -----  Regarding: Med Refill  Pt stated that pharmacy sent over a refill order for her medication and the order hasn't been tended to. Pt would like a call back to discuss getting a refill for medication at [facility] in [place]. Pt wants to be called back at [phone].

## 2022-11-05 NOTE — TELEPHONE ENCOUNTER
No new care gaps identified.  [facility] Embedded Care Gaps. Reference number: [id number]. 11/05/2022   8:45:47 AM KATHYT

## 2022-12-13 ENCOUNTER — OFFICE VISIT (OUTPATIENT)
Dept: FAMILY MEDICINE | Facility: CLINIC | Age: 87
End: 2022-12-13
Payer: MEDICARE

## 2022-12-13 VITALS
DIASTOLIC BLOOD PRESSURE: 62 MMHG | HEIGHT: 59 IN | SYSTOLIC BLOOD PRESSURE: 118 MMHG | OXYGEN SATURATION: 95 % | BODY MASS INDEX: 27.42 KG/M2 | WEIGHT: 136 LBS | HEART RATE: 90 BPM

## 2022-12-13 DIAGNOSIS — R05.9 COUGH, UNSPECIFIED TYPE: ICD-10-CM

## 2022-12-13 DIAGNOSIS — H10.13 ALLERGIC CONJUNCTIVITIS OF BOTH EYES: ICD-10-CM

## 2022-12-13 DIAGNOSIS — J30.2 SEASONAL ALLERGIC RHINITIS, UNSPECIFIED TRIGGER: Primary | ICD-10-CM

## 2022-12-13 LAB
CTP QC/QA: YES
SARS-COV-2 RDRP RESP QL NAA+PROBE: NEGATIVE

## 2022-12-13 PROCEDURE — 1160F RVW MEDS BY RX/DR IN RCRD: CPT | Mod: HCNC,CPTII,S$GLB, | Performed by: NURSE PRACTITIONER

## 2022-12-13 PROCEDURE — 1101F PT FALLS ASSESS-DOCD LE1/YR: CPT | Mod: HCNC,CPTII,S$GLB, | Performed by: NURSE PRACTITIONER

## 2022-12-13 PROCEDURE — 99214 OFFICE O/P EST MOD 30 MIN: CPT | Mod: HCNC,S$GLB,, | Performed by: NURSE PRACTITIONER

## 2022-12-13 PROCEDURE — 1159F PR MEDICATION LIST DOCUMENTED IN MEDICAL RECORD: ICD-10-PCS | Mod: HCNC,CPTII,S$GLB, | Performed by: NURSE PRACTITIONER

## 2022-12-13 PROCEDURE — 3288F FALL RISK ASSESSMENT DOCD: CPT | Mod: HCNC,CPTII,S$GLB, | Performed by: NURSE PRACTITIONER

## 2022-12-13 PROCEDURE — 99214 PR OFFICE/OUTPT VISIT, EST, LEVL IV, 30-39 MIN: ICD-10-PCS | Mod: HCNC,S$GLB,, | Performed by: NURSE PRACTITIONER

## 2022-12-13 PROCEDURE — 1160F PR REVIEW ALL MEDS BY PRESCRIBER/CLIN PHARMACIST DOCUMENTED: ICD-10-PCS | Mod: HCNC,CPTII,S$GLB, | Performed by: NURSE PRACTITIONER

## 2022-12-13 PROCEDURE — 99999 PR PBB SHADOW E&M-EST. PATIENT-LVL IV: CPT | Mod: PBBFAC,HCNC,, | Performed by: NURSE PRACTITIONER

## 2022-12-13 PROCEDURE — 1126F PR PAIN SEVERITY QUANTIFIED, NO PAIN PRESENT: ICD-10-PCS | Mod: HCNC,CPTII,S$GLB, | Performed by: NURSE PRACTITIONER

## 2022-12-13 PROCEDURE — 1159F MED LIST DOCD IN RCRD: CPT | Mod: HCNC,CPTII,S$GLB, | Performed by: NURSE PRACTITIONER

## 2022-12-13 PROCEDURE — 87635: ICD-10-PCS | Mod: QW,HCNC,S$GLB, | Performed by: NURSE PRACTITIONER

## 2022-12-13 PROCEDURE — 1101F PR PT FALLS ASSESS DOC 0-1 FALLS W/OUT INJ PAST YR: ICD-10-PCS | Mod: HCNC,CPTII,S$GLB, | Performed by: NURSE PRACTITIONER

## 2022-12-13 PROCEDURE — 3288F PR FALLS RISK ASSESSMENT DOCUMENTED: ICD-10-PCS | Mod: HCNC,CPTII,S$GLB, | Performed by: NURSE PRACTITIONER

## 2022-12-13 PROCEDURE — 99999 PR PBB SHADOW E&M-EST. PATIENT-LVL IV: ICD-10-PCS | Mod: PBBFAC,HCNC,, | Performed by: NURSE PRACTITIONER

## 2022-12-13 PROCEDURE — 1126F AMNT PAIN NOTED NONE PRSNT: CPT | Mod: HCNC,CPTII,S$GLB, | Performed by: NURSE PRACTITIONER

## 2022-12-13 PROCEDURE — 87635 SARS-COV-2 COVID-19 AMP PRB: CPT | Mod: QW,HCNC,S$GLB, | Performed by: NURSE PRACTITIONER

## 2022-12-13 RX ORDER — CIPROFLOXACIN HYDROCHLORIDE 3 MG/ML
1 SOLUTION/ DROPS OPHTHALMIC
Qty: 10 ML | Refills: 0 | Status: SHIPPED | OUTPATIENT
Start: 2022-12-13

## 2022-12-13 RX ORDER — PROMETHAZINE HYDROCHLORIDE AND DEXTROMETHORPHAN HYDROBROMIDE 6.25; 15 MG/5ML; MG/5ML
5 SYRUP ORAL EVERY 6 HOURS PRN
Qty: 200 ML | Refills: 0 | Status: SHIPPED | OUTPATIENT
Start: 2022-12-13 | End: 2022-12-23

## 2022-12-13 RX ORDER — PREDNISONE 20 MG/1
40 TABLET ORAL DAILY
Qty: 10 TABLET | Refills: 0 | Status: SHIPPED | OUTPATIENT
Start: 2022-12-13 | End: 2022-12-18

## 2022-12-13 NOTE — PROGRESS NOTES
Assessment/Plan:  Problem List Items Addressed This Visit    None  Visit Diagnoses       Seasonal allergic rhinitis, unspecified trigger    -  Primary    Relevant Medications    predniSONE (DELTASONE) 20 MG tablet    Allergic conjunctivitis of both eyes        Relevant Medications    ciprofloxacin HCl (CILOXAN) 0.3 % ophthalmic solution    Cough, unspecified type        Relevant Medications    promethazine-dextromethorphan (PROMETHAZINE-DM) 6.25-15 mg/5 mL Syrp    Other Relevant Orders    POCT COVID-19 Rapid Screening (Completed)          Covid- negative  Start steroids as prescribed. Risk of corticosteroids reviewed (elevated BP/glucose, insomnia, psychosis, bone loss, etc) and patient expressed understanding.   Cipro gtt to eyes bilaterally- suspect allergic conjunctivitis; however will cover bacterial since frequent itching/rubbing  Promethazine DM for cough   Consider OTC antihistamines   Follow up if symptoms worsen or fail to improve.  ER precautions for severe or worsening symptoms.      Keren Rodriguez NP  _____________________________________________________________________________________________________________________________________________________    CC: sinus problem     HPI: Patient is an 89-year-old female who presents in clinic today accompanied by her granddaughter as an established patient here for sinus problem. This is a new problem. The current episode started yesterday. The problem is gradually worsening. There has been no fever. She is experiencing no pain. Associated symptoms include congestion, postnasal drip, runny nose, cough, sneezing, itchy/red/watery eyes. She has had no eye pain or visual disturbances. She has had some crusting of eyelashes this morning. Her granddaughter states that the patient lives with her and she recently dusted the home. She suspects that she is having problems with allergies. Pertinent negatives include no chills, diaphoresis, ear pain, headaches, hoarse  voice, neck pain, shortness of breath, or swollen glands. Past treatments include Mucinex, Tessalon perles, and promethazine DM (requesting refill). The treatment provided some relief. She has had no known sick contacts. She is not vaccinated for covid.     Past Medical History:  Past Medical History:   Diagnosis Date    Age-related osteoporosis without current pathological fracture 8/13/2018    Chronic atrial fibrillation 8/13/2018    Chronic diastolic heart failure 8/13/2018    Elevated brain natriuretic peptide (BNP) level 6/19/2020    Elevated uric acid in blood 5/6/2021    GIB (gastrointestinal bleeding) 5/30/2020    Macrocytosis without anemia 9/23/2020    Mixed hyperlipidemia 8/13/2018    Osteopenia of multiple sites 12/9/2019    Prediabetes 12/9/2019    RA (rheumatoid arthritis)     Secondary hyperparathyroidism 5/6/2021    Tricuspid valve regurgitation 9/19/2018    Vitamin B12 deficiency (dietary) anemia 12/9/2019    Vitamin D insufficiency 12/9/2019     Past Surgical History:   Procedure Laterality Date    TOTAL ABDOMINAL HYSTERECTOMY W/ BILATERAL SALPINGOOPHORECTOMY       Review of patient's allergies indicates:   Allergen Reactions    Meloxicam Other (See Comments)     Decreases renal function    Nitrofurantoin monohyd/m-cryst Itching     Social History     Tobacco Use    Smoking status: Never    Smokeless tobacco: Never   Substance Use Topics    Alcohol use: Not Currently    Drug use: Never     Family History   Problem Relation Age of Onset    Aneurysm Mother      Current Outpatient Medications on File Prior to Visit   Medication Sig Dispense Refill    alendronate (FOSAMAX) 70 MG tablet Take 1 tablet (70 mg total) by mouth every 7 days. 12 tablet 1    busPIRone (BUSPAR) 5 MG Tab Take 1 tablet (5 mg total) by mouth 2 (two) times daily. 60 tablet 2    carvediloL (COREG) 12.5 MG tablet Take 1 tablet (12.5 mg total) by mouth 2 (two) times daily with meals. 60 tablet 2    cholecalciferol, vitamin D3,  (VITAMIN D3) 50 mcg (2,000 unit) Cap capsule Take 1 capsule (2,000 Units total) by mouth once daily. 90 capsule 1    citalopram (CELEXA) 10 MG tablet Take 1 tablet (10 mg total) by mouth every evening. 30 tablet 2    cyanocobalamin (VITAMIN B-12) 1000 MCG tablet Take 1 tablet (1,000 mcg total) by mouth once daily. 90 tablet 1    cyclobenzaprine (FLEXERIL) 10 MG tablet Take 10 mg by mouth nightly as needed.      ELIQUIS 2.5 mg Tab TAKE ONE TABLET BY MOUTH TWICE DAILY 60 tablet 2    ferrous sulfate (FEOSOL) 325 mg (65 mg iron) Tab tablet Take 1 tablet (325 mg total) by mouth every other day. 45 tablet 1    fluticasone-salmeterol diskus inhaler 100-50 mcg Inhale 1 puff into the lungs 2 (two) times daily. Controller      folic acid (FOLVITE) 1 MG tablet Take 1 tablet (1,000 mcg total) by mouth once daily. 90 tablet 1    furosemide (LASIX) 20 MG tablet Take 1 tablet (20 mg total) by mouth 2 (two) times a day. 180 tablet 3    gabapentin (NEURONTIN) 100 MG capsule Take 100 mg by mouth 2 (two) times daily.   1    HYDROcodone-acetaminophen (NORCO) 7.5-325 mg per tablet Take 1 tablet by mouth every 6 (six) hours as needed for Pain.      NIFEdipine (PROCARDIA-XL) 30 MG (OSM) 24 hr tablet Take 1 tablet (30 mg total) by mouth once daily. 30 tablet 3    nystatin (MYCOSTATIN) powder Apply topically 4 (four) times daily. Apply 1 application topically 4 (four) times daily. 60 g 0    pantoprazole (PROTONIX) 40 MG tablet Take 1 tablet (40 mg total) by mouth once daily. 30 tablet 2    pravastatin (PRAVACHOL) 40 MG tablet TAKE ONE TABLET BY MOUTH EVERY NIGHT 90 tablet 0     No current facility-administered medications on file prior to visit.     Review of Systems   Constitutional:  Negative for appetite change, chills, fatigue and fever.   HENT:  Positive for congestion, postnasal drip and sneezing. Negative for rhinorrhea and sore throat.    Eyes:  Positive for discharge (tearing), redness and itching. Negative for visual disturbance.  "  Respiratory:  Positive for cough. Negative for shortness of breath.    Cardiovascular:  Negative for chest pain, palpitations and leg swelling.   Gastrointestinal:  Negative for abdominal pain, diarrhea and vomiting.   Genitourinary:  Negative for difficulty urinating, dysuria and hematuria.   Musculoskeletal:  Negative for arthralgias and myalgias.   Skin:  Negative for rash and wound.   Allergic/Immunologic: Positive for environmental allergies.   Neurological:  Negative for dizziness and headaches.   Psychiatric/Behavioral:  Negative for behavioral problems. The patient is not nervous/anxious.      Vitals:    12/13/22 1100   BP: 118/62   BP Location: Right arm   Pulse: 90   SpO2: 95%   Weight: 61.7 kg (136 lb)   Height: 4' 11" (1.499 m)     Wt Readings from Last 3 Encounters:   12/13/22 61.7 kg (136 lb)   10/31/22 61.6 kg (135 lb 12.8 oz)   09/30/22 58.8 kg (129 lb 10.1 oz)     Physical Exam  Vitals reviewed.   Constitutional:       General: She is not in acute distress.     Appearance: Normal appearance. She is not ill-appearing.      Comments: Granddaughter present   HENT:      Head: Normocephalic and atraumatic.      Right Ear: Tympanic membrane and external ear normal.      Left Ear: Tympanic membrane and external ear normal.      Nose: Congestion and rhinorrhea present.      Mouth/Throat:      Mouth: Mucous membranes are moist.      Pharynx: No posterior oropharyngeal erythema.   Eyes:      Extraocular Movements: Extraocular movements intact.      Conjunctiva/sclera: Conjunctivae normal.      Comments: Sclera erythematous bilaterally    Cardiovascular:      Rate and Rhythm: Normal rate.      Heart sounds: Normal heart sounds.   Pulmonary:      Effort: Pulmonary effort is normal. No respiratory distress.      Breath sounds: Normal breath sounds.   Abdominal:      General: Abdomen is flat. There is no distension.   Musculoskeletal:         General: Normal range of motion.      Cervical back: Normal range of " motion and neck supple.   Lymphadenopathy:      Cervical: No cervical adenopathy.   Skin:     General: Skin is warm and dry.      Coloration: Skin is not pale.      Findings: No rash.   Neurological:      Mental Status: She is alert and oriented to person, place, and time. Mental status is at baseline.   Psychiatric:         Mood and Affect: Mood normal.         Speech: Speech normal.         Behavior: Behavior normal. Behavior is cooperative.     Health Maintenance   Topic Date Due    DEXA Scan  11/02/2022    TETANUS VACCINE  04/12/2023 (Originally 7/18/1951)    Lipid Panel  02/10/2023

## 2022-12-23 ENCOUNTER — TELEPHONE (OUTPATIENT)
Dept: FAMILY MEDICINE | Facility: CLINIC | Age: 87
End: 2022-12-23
Payer: MEDICARE

## 2022-12-23 NOTE — TELEPHONE ENCOUNTER
----- Message from Clemencia Manzo sent at 12/23/2022 10:29 AM CST -----  Contact: [phone]  Patient is having severe heartburn ,and throwing up,she has taken Protonix 40 mg and the chewable TUMs ,Please call back at [phone].Thanks

## 2023-02-07 DIAGNOSIS — Z00.00 ENCOUNTER FOR MEDICARE ANNUAL WELLNESS EXAM: ICD-10-CM

## 2023-02-09 DIAGNOSIS — Z00.00 ENCOUNTER FOR MEDICARE ANNUAL WELLNESS EXAM: ICD-10-CM

## 2023-04-27 ENCOUNTER — TELEPHONE (OUTPATIENT)
Dept: CARDIOLOGY | Facility: CLINIC | Age: 88
End: 2023-04-27
Payer: MEDICARE

## 2023-04-27 NOTE — TELEPHONE ENCOUNTER
Followed up with patients home health nurse Alicia with [facility] . She stated concern for some edema is patients LE. I recommended that we have the patient come in to clinic to be evaluated by Dr. Levy. Contacted the patients granddaughter to schedule appointment.   Rachel verbalized understanding of date, time and location of office visit.
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
## 2023-04-28 DIAGNOSIS — I48.20 CHRONIC ATRIAL FIBRILLATION: Primary | Chronic | ICD-10-CM

## 2023-05-16 ENCOUNTER — OFFICE VISIT (OUTPATIENT)
Dept: CARDIOLOGY | Facility: CLINIC | Age: 88
End: 2023-05-16
Payer: MEDICARE

## 2023-05-16 VITALS
WEIGHT: 138.63 LBS | DIASTOLIC BLOOD PRESSURE: 64 MMHG | HEART RATE: 82 BPM | OXYGEN SATURATION: 98 % | HEIGHT: 59 IN | SYSTOLIC BLOOD PRESSURE: 124 MMHG | BODY MASS INDEX: 27.95 KG/M2

## 2023-05-16 DIAGNOSIS — I36.1 NONRHEUMATIC TRICUSPID VALVE REGURGITATION: Primary | Chronic | ICD-10-CM

## 2023-05-16 DIAGNOSIS — R60.0 EDEMA LEG: ICD-10-CM

## 2023-05-16 DIAGNOSIS — I51.7 BIATRIAL ENLARGEMENT: ICD-10-CM

## 2023-05-16 DIAGNOSIS — I27.20 PULMONARY HYPERTENSION: ICD-10-CM

## 2023-05-16 DIAGNOSIS — I87.2 VENOUS INSUFFICIENCY: ICD-10-CM

## 2023-05-16 DIAGNOSIS — R05.3 CHRONIC COUGH: ICD-10-CM

## 2023-05-16 DIAGNOSIS — I48.20 CHRONIC ATRIAL FIBRILLATION: Chronic | ICD-10-CM

## 2023-05-16 PROCEDURE — 1159F MED LIST DOCD IN RCRD: CPT | Mod: CPTII,,, | Performed by: INTERNAL MEDICINE

## 2023-05-16 PROCEDURE — 99999 PR PBB SHADOW E&M-EST. PATIENT-LVL IV: ICD-10-PCS | Mod: PBBFAC,,, | Performed by: INTERNAL MEDICINE

## 2023-05-16 PROCEDURE — 99214 PR OFFICE/OUTPT VISIT, EST, LEVL IV, 30-39 MIN: ICD-10-PCS | Mod: ,,, | Performed by: INTERNAL MEDICINE

## 2023-05-16 PROCEDURE — 1159F PR MEDICATION LIST DOCUMENTED IN MEDICAL RECORD: ICD-10-PCS | Mod: CPTII,,, | Performed by: INTERNAL MEDICINE

## 2023-05-16 PROCEDURE — 1100F PTFALLS ASSESS-DOCD GE2>/YR: CPT | Mod: CPTII,,, | Performed by: INTERNAL MEDICINE

## 2023-05-16 PROCEDURE — 1126F PR PAIN SEVERITY QUANTIFIED, NO PAIN PRESENT: ICD-10-PCS | Mod: CPTII,,, | Performed by: INTERNAL MEDICINE

## 2023-05-16 PROCEDURE — 1100F PR PT FALLS ASSESS DOC 2+ FALLS/FALL W/INJURY/YR: ICD-10-PCS | Mod: CPTII,,, | Performed by: INTERNAL MEDICINE

## 2023-05-16 PROCEDURE — 3288F PR FALLS RISK ASSESSMENT DOCUMENTED: ICD-10-PCS | Mod: CPTII,,, | Performed by: INTERNAL MEDICINE

## 2023-05-16 PROCEDURE — 1126F AMNT PAIN NOTED NONE PRSNT: CPT | Mod: CPTII,,, | Performed by: INTERNAL MEDICINE

## 2023-05-16 PROCEDURE — 99214 OFFICE O/P EST MOD 30 MIN: CPT | Mod: ,,, | Performed by: INTERNAL MEDICINE

## 2023-05-16 PROCEDURE — 3288F FALL RISK ASSESSMENT DOCD: CPT | Mod: CPTII,,, | Performed by: INTERNAL MEDICINE

## 2023-05-16 PROCEDURE — 99999 PR PBB SHADOW E&M-EST. PATIENT-LVL IV: CPT | Mod: PBBFAC,,, | Performed by: INTERNAL MEDICINE

## 2023-05-16 RX ORDER — METOLAZONE 5 MG/1
TABLET ORAL
Qty: 5 TABLET | Refills: 0 | Status: SHIPPED | OUTPATIENT
Start: 2023-05-16 | End: 2023-06-13 | Stop reason: SDUPTHER

## 2023-05-16 NOTE — PROGRESS NOTES
Subjective:   Patient ID:  Miriam Oro is a 89 y.o. female who presents for follow-up of Shortness of Breath and Leg Swelling      [id number] yo WF with chronic AF, HTN, HLD and venous insufficiency. Recently broke her left hip and had been in rehab and has had worsening edema of legs with development of a cough.     Review of Systems   Constitutional: Negative for decreased appetite, fever, malaise/fatigue, weight gain and weight loss.   HENT:  Negative for hearing loss and nosebleeds.    Eyes:  Negative for visual disturbance.   Cardiovascular:  Positive for dyspnea on exertion, irregular heartbeat and leg swelling. Negative for chest pain, claudication, cyanosis, near-syncope, orthopnea, palpitations, paroxysmal nocturnal dyspnea and syncope.   Respiratory:  Positive for cough. Negative for hemoptysis, shortness of breath, sleep disturbances due to breathing, snoring and wheezing.    Endocrine: Negative for cold intolerance, heat intolerance, polydipsia and polyuria.   Hematologic/Lymphatic: Negative for adenopathy and bleeding problem. Does not bruise/bleed easily.   Skin:  Negative for color change, itching, poor wound healing, rash and suspicious lesions.   Musculoskeletal:  Positive for arthritis and joint pain. Negative for back pain, falls, joint swelling, muscle cramps, muscle weakness and myalgias.   Gastrointestinal:  Negative for bloating, abdominal pain, change in bowel habit, constipation, flatus, heartburn, hematemesis, hematochezia, hemorrhoids, jaundice, melena, nausea and vomiting.   Genitourinary:  Negative for bladder incontinence, decreased libido, frequency, hematuria, hesitancy and urgency.   Neurological:  Negative for brief paralysis, difficulty with concentration, excessive daytime sleepiness, dizziness, focal weakness, headaches, light-headedness, loss of balance, numbness, vertigo and weakness.   Psychiatric/Behavioral:  Negative for altered mental status, depression and memory loss.  "The patient does not have insomnia and is not nervous/anxious.    Allergic/Immunologic: Negative for environmental allergies, hives and persistent infections.     Objective:   Physical Exam  Vitals and nursing note reviewed.   Constitutional:       Appearance: She is well-developed.      Comments: /64   Pulse 82   Ht 4' 11" (1.499 m)   Wt 62.9 kg (138 lb 9.6 oz)   SpO2 98%   BMI 27.99 kg/m²      HENT:      Head: Normocephalic and atraumatic.      Right Ear: External ear normal.      Left Ear: External ear normal.      Nose: Nose normal.   Eyes:      General: Lids are normal. No scleral icterus.        Right eye: No discharge.         Left eye: No discharge.      Conjunctiva/sclera: Conjunctivae normal.      Right eye: No hemorrhage.     Pupils: Pupils are equal, round, and reactive to light.   Neck:      Thyroid: No thyromegaly.      Vascular: No JVD.      Trachea: No tracheal deviation.   Cardiovascular:      Rate and Rhythm: Normal rate. Rhythm irregularly irregular.      Pulses: Intact distal pulses.      Heart sounds: Normal heart sounds. No murmur heard.    No friction rub. No gallop.   Pulmonary:      Effort: Pulmonary effort is normal. No respiratory distress.      Breath sounds: Rales present. No wheezing.   Chest:      Chest wall: No tenderness.   Breasts:     Breasts are symmetrical.   Abdominal:      General: Bowel sounds are normal. There is no distension.      Palpations: Abdomen is soft. There is no hepatomegaly or mass.      Tenderness: There is no abdominal tenderness. There is no guarding or rebound.   Musculoskeletal:         General: No tenderness. Normal range of motion.      Cervical back: Normal range of motion and neck supple.      Right lower leg: Edema present.      Left lower leg: Edema present.   Lymphadenopathy:      Cervical: No cervical adenopathy.   Skin:     General: Skin is warm and dry.      Coloration: Skin is not pale.      Findings: No erythema or rash.   Neurological: "      Mental Status: She is alert and oriented to person, place, and time.      Cranial Nerves: No cranial nerve deficit.      Coordination: Coordination normal.      Deep Tendon Reflexes: Reflexes are normal and symmetric. Reflexes normal.   Psychiatric:         Behavior: Behavior normal.         Thought Content: Thought content normal.         Judgment: Judgment normal.       Assessment:      1. Nonrheumatic tricuspid valve regurgitation    2. Pulmonary hypertension    3. Venous insufficiency    4. Chronic atrial fibrillation    5. Biatrial enlargement    6. Edema leg    7. Chronic cough        Plan:   Add zaroxolyn 5 mg po daily for 5 days. If significant improvement stop after three days   Leg elevation     Orders Placed This Encounter   Procedures    X-Ray Chest PA And Lateral    Basic Metabolic Panel    Echo     Follow up in about 1 month (around 6/16/2023).

## 2023-05-23 ENCOUNTER — HOSPITAL ENCOUNTER (OUTPATIENT)
Dept: RADIOLOGY | Facility: HOSPITAL | Age: 88
Discharge: HOME | End: 2023-05-23
Attending: INTERNAL MEDICINE
Payer: MEDICARE

## 2023-05-23 DIAGNOSIS — R05.3 CHRONIC COUGH: ICD-10-CM

## 2023-05-23 PROCEDURE — 71046 X-RAY EXAM CHEST 2 VIEWS: CPT | Mod: 26,,, | Performed by: STUDENT IN AN ORGANIZED HEALTH CARE EDUCATION/TRAINING PROGRAM

## 2023-05-23 PROCEDURE — 71046 XR CHEST PA AND LATERAL: ICD-10-PCS | Mod: 26,,, | Performed by: STUDENT IN AN ORGANIZED HEALTH CARE EDUCATION/TRAINING PROGRAM

## 2023-05-23 PROCEDURE — 71046 X-RAY EXAM CHEST 2 VIEWS: CPT | Mod: TC,PO

## 2023-05-31 ENCOUNTER — TELEPHONE (OUTPATIENT)
Dept: CARDIOLOGY | Facility: HOSPITAL | Age: 88
End: 2023-05-31
Payer: MEDICARE

## 2023-06-13 ENCOUNTER — OFFICE VISIT (OUTPATIENT)
Dept: CARDIOLOGY | Facility: CLINIC | Age: 88
End: 2023-06-13
Payer: MEDICARE

## 2023-06-13 ENCOUNTER — HOSPITAL ENCOUNTER (OUTPATIENT)
Dept: CARDIOLOGY | Facility: HOSPITAL | Age: 88
Discharge: HOME | End: 2023-06-13
Attending: INTERNAL MEDICINE
Payer: MEDICARE

## 2023-06-13 VITALS
SYSTOLIC BLOOD PRESSURE: 110 MMHG | BODY MASS INDEX: 27.82 KG/M2 | WEIGHT: 145.88 LBS | WEIGHT: 138 LBS | BODY MASS INDEX: 29.41 KG/M2 | DIASTOLIC BLOOD PRESSURE: 59 MMHG | SYSTOLIC BLOOD PRESSURE: 124 MMHG | HEIGHT: 59 IN | HEART RATE: 70 BPM | HEIGHT: 59 IN | DIASTOLIC BLOOD PRESSURE: 64 MMHG

## 2023-06-13 DIAGNOSIS — E78.2 MIXED HYPERLIPIDEMIA: Chronic | ICD-10-CM

## 2023-06-13 DIAGNOSIS — I51.7 BIATRIAL ENLARGEMENT: ICD-10-CM

## 2023-06-13 DIAGNOSIS — I48.20 CHRONIC ATRIAL FIBRILLATION: Chronic | ICD-10-CM

## 2023-06-13 DIAGNOSIS — I27.20 PULMONARY HYPERTENSION: ICD-10-CM

## 2023-06-13 DIAGNOSIS — R60.0 EDEMA LEG: Primary | ICD-10-CM

## 2023-06-13 DIAGNOSIS — I48.20 CHRONIC ATRIAL FIBRILLATION: ICD-10-CM

## 2023-06-13 DIAGNOSIS — M85.89 OSTEOPENIA OF MULTIPLE SITES: ICD-10-CM

## 2023-06-13 DIAGNOSIS — I87.2 VENOUS INSUFFICIENCY: ICD-10-CM

## 2023-06-13 DIAGNOSIS — R60.0 EDEMA LEG: ICD-10-CM

## 2023-06-13 LAB
AORTIC ROOT ANNULUS: 3.3 CM
ASCENDING AORTA: 2.65 CM
AV INDEX (PROSTH): 0.55
AV MEAN GRADIENT: 5 MMHG
AV PEAK GRADIENT: 9 MMHG
AV VALVE AREA: 1.42 CM2
AV VELOCITY RATIO: 0.6
BSA FOR ECHO PROCEDURE: 1.61 M2
CV ECHO LV RWT: 0.68 CM
DOP CALC AO PEAK VEL: 1.48 M/S
DOP CALC AO VTI: 34.9 CM
DOP CALC LVOT AREA: 2.6 CM2
DOP CALC LVOT DIAMETER: 1.82 CM
DOP CALC LVOT PEAK VEL: 0.89 M/S
DOP CALC LVOT STROKE VOLUME: 49.66 CM3
DOP CALC RVOT PEAK VEL: 0.77 M/S
DOP CALC RVOT VTI: 16.9 CM
DOP CALCLVOT PEAK VEL VTI: 19.1 CM
E WAVE DECELERATION TIME: 118.76 MSEC
E/A RATIO: 4.11
E/E' RATIO: 7.42 M/S
ECHO LV POSTERIOR WALL: 1.3 CM (ref 0.6–1.1)
EJECTION FRACTION: 60 %
FRACTIONAL SHORTENING: 33 % (ref 28–44)
INTERVENTRICULAR SEPTUM: 1.35 CM (ref 0.6–1.1)
IVC DIAMETER: 2.13 CM
IVRT: 48.53 MSEC
LA MAJOR: 6.53 CM
LA MINOR: 6.3 CM
LA WIDTH: 4 CM
LEFT ATRIUM SIZE: 4.26 CM
LEFT ATRIUM VOLUME INDEX MOD: 57.3 ML/M2
LEFT ATRIUM VOLUME INDEX: 59.2 ML/M2
LEFT ATRIUM VOLUME MOD: 89.93 CM3
LEFT ATRIUM VOLUME: 92.89 CM3
LEFT INTERNAL DIMENSION IN SYSTOLE: 2.55 CM (ref 2.1–4)
LEFT VENTRICLE DIASTOLIC VOLUME INDEX: 39.8 ML/M2
LEFT VENTRICLE DIASTOLIC VOLUME: 62.48 ML
LEFT VENTRICLE MASS INDEX: 114 G/M2
LEFT VENTRICLE SYSTOLIC VOLUME INDEX: 14.9 ML/M2
LEFT VENTRICLE SYSTOLIC VOLUME: 23.43 ML
LEFT VENTRICULAR INTERNAL DIMENSION IN DIASTOLE: 3.81 CM (ref 3.5–6)
LEFT VENTRICULAR MASS: 178.88 G
LV LATERAL E/E' RATIO: 7.67 M/S
LV SEPTAL E/E' RATIO: 7.19 M/S
LVOT MG: 1.84 MMHG
LVOT MV: 0.64 CM/S
MV PEAK A VEL: 0.28 M/S
MV PEAK E VEL: 1.15 M/S
MV STENOSIS PRESSURE HALF TIME: 34.44 MS
MV VALVE AREA P 1/2 METHOD: 6.39 CM2
PISA TR MAX VEL: 3.34 M/S
PV MEAN GRADIENT: 1.17 MMHG
PV PEAK VELOCITY: 0.88 CM/S
RA MAJOR: 7.04 CM
RA PRESSURE: 8 MMHG
RIGHT VENTRICULAR END-DIASTOLIC DIMENSION: 4.51 CM
STJ: 2.87 CM
TDI LATERAL: 0.15 M/S
TDI SEPTAL: 0.16 M/S
TDI: 0.16 M/S
TR MAX PG: 45 MMHG
TRICUSPID ANNULAR PLANE SYSTOLIC EXCURSION: 1.7 CM
TV REST PULMONARY ARTERY PRESSURE: 53 MMHG

## 2023-06-13 PROCEDURE — 99999 PR PBB SHADOW E&M-EST. PATIENT-LVL IV: CPT | Mod: PBBFAC,,, | Performed by: INTERNAL MEDICINE

## 2023-06-13 PROCEDURE — 1160F PR REVIEW ALL MEDS BY PRESCRIBER/CLIN PHARMACIST DOCUMENTED: ICD-10-PCS | Mod: CPTII,S$GLB,, | Performed by: INTERNAL MEDICINE

## 2023-06-13 PROCEDURE — 1126F PR PAIN SEVERITY QUANTIFIED, NO PAIN PRESENT: ICD-10-PCS | Mod: CPTII,S$GLB,, | Performed by: INTERNAL MEDICINE

## 2023-06-13 PROCEDURE — 3288F FALL RISK ASSESSMENT DOCD: CPT | Mod: CPTII,S$GLB,, | Performed by: INTERNAL MEDICINE

## 2023-06-13 PROCEDURE — 99214 PR OFFICE/OUTPT VISIT, EST, LEVL IV, 30-39 MIN: ICD-10-PCS | Mod: 25,S$GLB,, | Performed by: INTERNAL MEDICINE

## 2023-06-13 PROCEDURE — 93306 TTE W/DOPPLER COMPLETE: CPT | Mod: PO

## 2023-06-13 PROCEDURE — 1126F AMNT PAIN NOTED NONE PRSNT: CPT | Mod: CPTII,S$GLB,, | Performed by: INTERNAL MEDICINE

## 2023-06-13 PROCEDURE — 1100F PTFALLS ASSESS-DOCD GE2>/YR: CPT | Mod: CPTII,S$GLB,, | Performed by: INTERNAL MEDICINE

## 2023-06-13 PROCEDURE — 1159F PR MEDICATION LIST DOCUMENTED IN MEDICAL RECORD: ICD-10-PCS | Mod: CPTII,S$GLB,, | Performed by: INTERNAL MEDICINE

## 2023-06-13 PROCEDURE — 99214 OFFICE O/P EST MOD 30 MIN: CPT | Mod: 25,S$GLB,, | Performed by: INTERNAL MEDICINE

## 2023-06-13 PROCEDURE — 1160F RVW MEDS BY RX/DR IN RCRD: CPT | Mod: CPTII,S$GLB,, | Performed by: INTERNAL MEDICINE

## 2023-06-13 PROCEDURE — 1159F MED LIST DOCD IN RCRD: CPT | Mod: CPTII,S$GLB,, | Performed by: INTERNAL MEDICINE

## 2023-06-13 PROCEDURE — 3288F PR FALLS RISK ASSESSMENT DOCUMENTED: ICD-10-PCS | Mod: CPTII,S$GLB,, | Performed by: INTERNAL MEDICINE

## 2023-06-13 PROCEDURE — 93306 ECHO (CUPID ONLY): ICD-10-PCS | Mod: 26,,, | Performed by: INTERNAL MEDICINE

## 2023-06-13 PROCEDURE — 99999 PR PBB SHADOW E&M-EST. PATIENT-LVL IV: ICD-10-PCS | Mod: PBBFAC,,, | Performed by: INTERNAL MEDICINE

## 2023-06-13 PROCEDURE — 1100F PR PT FALLS ASSESS DOC 2+ FALLS/FALL W/INJURY/YR: ICD-10-PCS | Mod: CPTII,S$GLB,, | Performed by: INTERNAL MEDICINE

## 2023-06-13 PROCEDURE — 93306 TTE W/DOPPLER COMPLETE: CPT | Mod: 26,,, | Performed by: INTERNAL MEDICINE

## 2023-06-13 RX ORDER — METOLAZONE 5 MG/1
TABLET ORAL
Qty: 30 TABLET | Refills: 3 | Status: SHIPPED | OUTPATIENT
Start: 2023-06-13

## 2023-06-13 NOTE — TELEPHONE ENCOUNTER
Refill Routing Note   Medication(s) are not appropriate for processing by [facility] for the following reason(s):      Medication outside of protocol    [facility] action(s):  Route None identified            Appointments  past 12m or future 3m with PCP    Date Provider   Last Visit   9/30/2022 Zoey Jackson MD   Next Visit   Visit date not found Zoey Jackson MD   ED visits in past 90 days: 0        Note composed:2:48 PM 06/13/2023

## 2023-06-13 NOTE — PROGRESS NOTES
Subjective:   Patient ID:  Miriam Oro is a 89 y.o. female who presents for follow-up of Follow-up (1 month)      [id number] yo WF with chronic AF, HTN, HLD and venous insufficiency. Recently broke her left hip and had been in rehab and has had worsening edema of legs. Breathing OK    Summary    The left ventricle is normal in size with concentric hypertrophy and normal systolic function.  Severe left atrial enlargement.  The estimated ejection fraction is 60%.  Indeterminate left ventricular diastolic function.  Mild right ventricular enlargement with normal right ventricular systolic function.  Severe right atrial enlargement.  The aortic valve appears structurally normal. There is normal leaflet mobility  The mitral valve appears structurally normal. There is normal leaflet mobility  Mild mitral regurgitation.  Severe tricuspid regurgitation.  Intermediate central venous pressure (8 mmHg).  The estimated PA systolic pressure is 53 mmHg.  There is pulmonary hypertension.  Atrial fib noted    Review of Systems   Constitutional: Negative for decreased appetite, fever, malaise/fatigue, weight gain and weight loss.   HENT:  Negative for hearing loss and nosebleeds.    Eyes:  Negative for visual disturbance.   Cardiovascular:  Positive for dyspnea on exertion and leg swelling. Negative for chest pain, claudication, cyanosis, irregular heartbeat, near-syncope, orthopnea, palpitations, paroxysmal nocturnal dyspnea and syncope.   Respiratory:  Positive for shortness of breath. Negative for cough, hemoptysis, sleep disturbances due to breathing, snoring and wheezing.    Endocrine: Negative for cold intolerance, heat intolerance, polydipsia and polyuria.   Hematologic/Lymphatic: Negative for adenopathy and bleeding problem. Does not bruise/bleed easily.   Skin:  Negative for color change, itching, poor wound healing, rash and suspicious lesions.   Musculoskeletal:  Negative for arthritis, back pain, falls, joint pain, joint  "swelling, muscle cramps, muscle weakness and myalgias.   Gastrointestinal:  Negative for bloating, abdominal pain, change in bowel habit, constipation, flatus, heartburn, hematemesis, hematochezia, hemorrhoids, jaundice, melena, nausea and vomiting.   Genitourinary:  Negative for bladder incontinence, decreased libido, frequency, hematuria, hesitancy and urgency.   Neurological:  Negative for brief paralysis, difficulty with concentration, excessive daytime sleepiness, dizziness, focal weakness, headaches, light-headedness, loss of balance, numbness, vertigo and weakness.   Psychiatric/Behavioral:  Negative for altered mental status, depression and memory loss. The patient does not have insomnia and is not nervous/anxious.    Allergic/Immunologic: Negative for environmental allergies, hives and persistent infections.     Objective:   Physical Exam  Vitals and nursing note reviewed.   Constitutional:       Appearance: She is well-developed.      Comments: BP (!) 110/59 (BP Location: Right arm, Patient Position: Sitting, BP Method: Large (Manual))   Ht 4' 11" (1.499 m)   Wt 66.2 kg (145 lb 14.4 oz)   BMI 29.47 kg/m²      HENT:      Head: Normocephalic and atraumatic.      Right Ear: External ear normal.      Left Ear: External ear normal.      Nose: Nose normal.   Eyes:      General: Lids are normal. No scleral icterus.        Right eye: No discharge.         Left eye: No discharge.      Conjunctiva/sclera: Conjunctivae normal.      Right eye: No hemorrhage.     Pupils: Pupils are equal, round, and reactive to light.   Neck:      Thyroid: No thyromegaly.      Vascular: No JVD.      Trachea: No tracheal deviation.   Cardiovascular:      Rate and Rhythm: Normal rate. Rhythm irregular.      Pulses: Intact distal pulses.      Heart sounds: Normal heart sounds. No murmur heard.    No friction rub. No gallop.   Pulmonary:      Effort: Pulmonary effort is normal. No respiratory distress.      Breath sounds: Normal breath " sounds. No wheezing or rales.   Chest:      Chest wall: No tenderness.   Breasts:     Breasts are symmetrical.   Abdominal:      General: Bowel sounds are normal. There is no distension.      Palpations: Abdomen is soft. There is no hepatomegaly or mass.      Tenderness: There is no abdominal tenderness. There is no guarding or rebound.   Musculoskeletal:         General: No tenderness. Normal range of motion.      Cervical back: Normal range of motion and neck supple.      Right lower leg: Edema present.      Left lower leg: Edema present.      Comments: +3   Lymphadenopathy:      Cervical: No cervical adenopathy.   Skin:     General: Skin is warm and dry.      Coloration: Skin is not pale.      Findings: No erythema or rash.   Neurological:      Mental Status: She is alert and oriented to person, place, and time.      Cranial Nerves: No cranial nerve deficit.      Coordination: Coordination normal.      Deep Tendon Reflexes: Reflexes are normal and symmetric. Reflexes normal.   Psychiatric:         Behavior: Behavior normal.         Thought Content: Thought content normal.         Judgment: Judgment normal.       Assessment:      1. Edema leg    2. Pulmonary hypertension    3. Mixed hyperlipidemia    4. Chronic atrial fibrillation    5. Biatrial enlargement    6. Venous insufficiency        Plan:   Will use metolazone for 5 days which she responded to before than prn   Low salt   Leg elevation     Orders Placed This Encounter   Procedures    BASIC METABOLIC PANEL     Follow up in about 1 month (around 7/13/2023).

## 2023-06-14 RX ORDER — ALENDRONATE SODIUM 70 MG/1
TABLET ORAL
Qty: 12 TABLET | Refills: 1 | Status: SHIPPED | OUTPATIENT
Start: 2023-06-14 | End: 2024-01-22 | Stop reason: SDUPTHER

## 2023-07-06 DIAGNOSIS — F41.9 ANXIETY: ICD-10-CM

## 2023-07-06 DIAGNOSIS — I48.20 CHRONIC ATRIAL FIBRILLATION: ICD-10-CM

## 2023-07-06 RX ORDER — BUSPIRONE HYDROCHLORIDE 5 MG/1
TABLET ORAL
Qty: 60 TABLET | Refills: 2 | Status: SHIPPED | OUTPATIENT
Start: 2023-07-06 | End: 2023-09-27

## 2023-07-06 RX ORDER — APIXABAN 2.5 MG/1
TABLET, FILM COATED ORAL
Qty: 60 TABLET | Refills: 2 | Status: SHIPPED | OUTPATIENT
Start: 2023-07-06 | End: 2023-11-30

## 2023-07-06 NOTE — TELEPHONE ENCOUNTER
Refill Routing Note   Medication(s) are not appropriate for processing by [facility] for the following reason(s):      Medication outside of protocol    [facility] action(s):  Route Care Due:  None identified          Appointments  past 12m or future 3m with PCP    Date Provider   Last Visit   9/30/2022 Zoey Jackson MD   Next Visit   Visit date not found Zoey Jackson MD   ED visits in past 90 days: 0        Note composed:9:20 AM 07/06/2023

## 2023-07-31 DIAGNOSIS — R60.0 BILATERAL LOWER EXTREMITY EDEMA: ICD-10-CM

## 2023-08-01 RX ORDER — FUROSEMIDE 20 MG/1
20 TABLET ORAL 2 TIMES DAILY
Qty: 180 TABLET | Refills: 3 | Status: SHIPPED | OUTPATIENT
Start: 2023-08-01

## 2023-11-30 DIAGNOSIS — I48.20 CHRONIC ATRIAL FIBRILLATION: ICD-10-CM

## 2023-11-30 RX ORDER — APIXABAN 2.5 MG/1
TABLET, FILM COATED ORAL
Qty: 60 TABLET | Refills: 1 | Status: SHIPPED | OUTPATIENT
Start: 2023-11-30

## 2023-11-30 NOTE — TELEPHONE ENCOUNTER
Refill Routing Note   Medication(s) are not appropriate for processing by [facility] for the following reason(s):        Outside of protocol    [facility] action(s):  Route               Appointments  past 12m or future 3m with PCP    Date Provider   Last Visit   9/30/2022 Zoey Jackson MD   Next Visit   Visit date not found Zoey Jackson MD   ED visits in past 90 days: 0        Note composed:11:54 AM 11/30/2023

## 2024-01-16 ENCOUNTER — PATIENT OUTREACH (OUTPATIENT)
Dept: ADMINISTRATIVE | Facility: HOSPITAL | Age: 89
End: 2024-01-16
Payer: MEDICARE

## 2024-01-16 NOTE — PROGRESS NOTES
Patient not seen by PCP in 12 months or longer Report: Called Pt to schedule labs & PCP visit. Pt did not answer left voicemail with callback number.

## 2024-01-17 ENCOUNTER — TELEPHONE (OUTPATIENT)
Dept: FAMILY MEDICINE | Facility: CLINIC | Age: 89
End: 2024-01-17
Payer: MEDICARE

## 2024-01-17 NOTE — TELEPHONE ENCOUNTER
----- Message from Annette Still sent at 1/17/2024 11:31 AM CST -----  Contact: Amaya/granddaughter  Amaya is needing a call back in regards to refilling several of the patients medications. Please give her a call back at [phone]

## 2024-01-17 NOTE — TELEPHONE ENCOUNTER
Spoke with Amaya pt's daughter to schedule appt next week for medication refills. Appt made with Gloria Phan NP 1/22/24 at 2:40 PM.

## 2024-01-22 ENCOUNTER — OFFICE VISIT (OUTPATIENT)
Dept: FAMILY MEDICINE | Facility: CLINIC | Age: 89
End: 2024-01-22
Payer: MEDICARE

## 2024-01-22 VITALS
SYSTOLIC BLOOD PRESSURE: 138 MMHG | WEIGHT: 136.69 LBS | RESPIRATION RATE: 17 BRPM | DIASTOLIC BLOOD PRESSURE: 85 MMHG | HEART RATE: 72 BPM | BODY MASS INDEX: 27.56 KG/M2 | HEIGHT: 59 IN

## 2024-01-22 DIAGNOSIS — I10 PRIMARY HYPERTENSION: ICD-10-CM

## 2024-01-22 DIAGNOSIS — R73.03 PREDIABETES: ICD-10-CM

## 2024-01-22 DIAGNOSIS — Z78.0 ASYMPTOMATIC AGE-RELATED POSTMENOPAUSAL STATE: ICD-10-CM

## 2024-01-22 DIAGNOSIS — E78.2 MIXED HYPERLIPIDEMIA: Chronic | ICD-10-CM

## 2024-01-22 DIAGNOSIS — I48.20 CHRONIC ATRIAL FIBRILLATION: Chronic | ICD-10-CM

## 2024-01-22 DIAGNOSIS — I50.32 CHRONIC DIASTOLIC HEART FAILURE: Chronic | ICD-10-CM

## 2024-01-22 DIAGNOSIS — M81.0 AGE-RELATED OSTEOPOROSIS WITHOUT CURRENT PATHOLOGICAL FRACTURE: ICD-10-CM

## 2024-01-22 DIAGNOSIS — Z86.39 HISTORY OF NON ANEMIC VITAMIN B12 DEFICIENCY: ICD-10-CM

## 2024-01-22 DIAGNOSIS — D75.89 MACROCYTOSIS WITHOUT ANEMIA: ICD-10-CM

## 2024-01-22 DIAGNOSIS — M54.50 CHRONIC BILATERAL LOW BACK PAIN WITHOUT SCIATICA: ICD-10-CM

## 2024-01-22 DIAGNOSIS — K21.9 GASTROESOPHAGEAL REFLUX DISEASE WITHOUT ESOPHAGITIS: Chronic | ICD-10-CM

## 2024-01-22 DIAGNOSIS — G89.29 CHRONIC BILATERAL LOW BACK PAIN WITHOUT SCIATICA: ICD-10-CM

## 2024-01-22 DIAGNOSIS — N18.31 CKD STAGE G3A/A1, GFR 45-59 AND ALBUMIN CREATININE RATIO <30 MG/G: Chronic | ICD-10-CM

## 2024-01-22 DIAGNOSIS — F41.9 ANXIETY: Primary | Chronic | ICD-10-CM

## 2024-01-22 PROCEDURE — 99214 OFFICE O/P EST MOD 30 MIN: CPT | Mod: HCNC,S$GLB,, | Performed by: PHYSICIAN ASSISTANT

## 2024-01-22 PROCEDURE — 1160F RVW MEDS BY RX/DR IN RCRD: CPT | Mod: HCNC,CPTII,S$GLB, | Performed by: PHYSICIAN ASSISTANT

## 2024-01-22 PROCEDURE — 99999 PR PBB SHADOW E&M-EST. PATIENT-LVL V: CPT | Mod: PBBFAC,HCNC,, | Performed by: PHYSICIAN ASSISTANT

## 2024-01-22 PROCEDURE — 3288F FALL RISK ASSESSMENT DOCD: CPT | Mod: HCNC,CPTII,S$GLB, | Performed by: PHYSICIAN ASSISTANT

## 2024-01-22 PROCEDURE — 1101F PT FALLS ASSESS-DOCD LE1/YR: CPT | Mod: HCNC,CPTII,S$GLB, | Performed by: PHYSICIAN ASSISTANT

## 2024-01-22 PROCEDURE — 1159F MED LIST DOCD IN RCRD: CPT | Mod: HCNC,CPTII,S$GLB, | Performed by: PHYSICIAN ASSISTANT

## 2024-01-22 RX ORDER — BUSPIRONE HYDROCHLORIDE 5 MG/1
5 TABLET ORAL 2 TIMES DAILY
Qty: 180 TABLET | Refills: 3 | Status: SHIPPED | OUTPATIENT
Start: 2024-01-22 | End: 2025-01-16

## 2024-01-22 RX ORDER — CITALOPRAM 10 MG/1
10 TABLET ORAL NIGHTLY
Qty: 90 TABLET | Refills: 3 | Status: SHIPPED | OUTPATIENT
Start: 2024-01-22

## 2024-01-22 RX ORDER — LANOLIN ALCOHOL/MO/W.PET/CERES
1000 CREAM (GRAM) TOPICAL DAILY
Qty: 90 TABLET | Refills: 3 | Status: SHIPPED | OUTPATIENT
Start: 2024-01-22

## 2024-01-22 RX ORDER — PRAVASTATIN SODIUM 40 MG/1
40 TABLET ORAL NIGHTLY
Qty: 90 TABLET | Refills: 3 | Status: SHIPPED | OUTPATIENT
Start: 2024-01-22

## 2024-01-22 RX ORDER — ALENDRONATE SODIUM 70 MG/1
70 TABLET ORAL
Qty: 12 TABLET | Refills: 3 | Status: SHIPPED | OUTPATIENT
Start: 2024-01-22

## 2024-01-22 RX ORDER — NIFEDIPINE 30 MG/1
30 TABLET, EXTENDED RELEASE ORAL DAILY
Qty: 90 TABLET | Refills: 3 | Status: SHIPPED | OUTPATIENT
Start: 2024-01-22

## 2024-01-22 RX ORDER — CARVEDILOL 12.5 MG/1
12.5 TABLET ORAL 2 TIMES DAILY WITH MEALS
Qty: 180 TABLET | Refills: 3 | Status: SHIPPED | OUTPATIENT
Start: 2024-01-22

## 2024-01-22 RX ORDER — PANTOPRAZOLE SODIUM 40 MG/1
40 TABLET, DELAYED RELEASE ORAL DAILY
Qty: 90 TABLET | Refills: 3 | Status: SHIPPED | OUTPATIENT
Start: 2024-01-22

## 2024-01-22 RX ORDER — ACETAMINOPHEN 500 MG
2000 TABLET ORAL DAILY
Qty: 90 CAPSULE | Refills: 3 | Status: SHIPPED | OUTPATIENT
Start: 2024-01-22

## 2024-01-22 NOTE — PROGRESS NOTES
Assessment/Plan:    Problem List Items Addressed This Visit          Psychiatric    Anxiety - Primary (Chronic)    Overview     Chronic hx; well controlled with celexa and buspar         Relevant Medications    busPIRone (BUSPAR) 5 MG Tab    citalopram (CELEXA) 10 MG tablet       Cardiac/Vascular    Chronic atrial fibrillation (Chronic)    Overview     -followed by cardiology  -remains on Eliquis for anticoagulation and Coreg for rate control  -currently asymptomatic         Relevant Medications    carvediloL (COREG) 12.5 MG tablet    Chronic diastolic heart failure (Chronic)    Overview     -chronic, stable  -appears compensated on exam  -established with cardiologist, Dr. Levy     Echo 06/2023  Summary  The left ventricle is normal in size with concentric hypertrophy and normal systolic function.  Severe left atrial enlargement.  The estimated ejection fraction is 60%.  Indeterminate left ventricular diastolic function.  Mild right ventricular enlargement with normal right ventricular systolic function.  Severe right atrial enlargement.  The aortic valve appears structurally normal. There is normal leaflet mobility  The mitral valve appears structurally normal. There is normal leaflet mobility  Mild mitral regurgitation.  Severe tricuspid regurgitation.  Intermediate central venous pressure (8 mmHg).  The estimated PA systolic pressure is 53 mmHg.  There is pulmonary hypertension.  Atrial fib noted         Mixed hyperlipidemia (Chronic)    Overview     Hyperlipidemia Medications               pravastatin (PRAVACHOL) 40 MG tablet TAKE ONE TABLET BY MOUTH EVERY NIGHT        -chronic condition. Currently stable.    -reports compliance with hyperlipidemia treatment as prescribed  -denies any known adverse effects of medications  -most recent labs listed below:  Lab Results   Component Value Date    CHOL 138 02/10/2022     Lab Results   Component Value Date    HDL 66 02/10/2022     Lab Results   Component Value Date     LDLCALC 56.0 (L) 02/10/2022     Lab Results   Component Value Date    TRIG 80 02/10/2022     Lab Results   Component Value Date    ALT 12 04/01/2023    AST 19 04/01/2023    ALKPHOS 89 04/01/2023    BILITOT 0.5 04/01/2023            Relevant Medications    pravastatin (PRAVACHOL) 40 MG tablet    Other Relevant Orders    Lipid Panel    Primary hypertension    Overview     Hypertension Medications               furosemide (LASIX) 20 MG tablet TAKE ONE TABLET BY MOUTH TWICE DAILY    metOLazone (ZAROXOLYN) 5 MG tablet One tablet po daily for five days.    carvediloL (COREG) 12.5 MG tablet Take 1 tablet (12.5 mg total) by mouth 2 (two) times daily with meals.    NIFEdipine (PROCARDIA-XL) 30 MG (OSM) 24 hr tablet Take 1 tablet (30 mg total) by mouth once daily.        -at goal today  -continue lifestyle modification with low sodium diet and exercise   -discussed hypertension disease course and importance of treating high blood pressure  -patient understood and advised of risk of untreated blood pressure. ER precautions were given   for symptoms of hypertensive urgency and emergency.           Relevant Medications    carvediloL (COREG) 12.5 MG tablet    NIFEdipine (PROCARDIA-XL) 30 MG (OSM) 24 hr tablet    Other Relevant Orders    CBC Auto Differential    Comprehensive Metabolic Panel       Renal/    CKD stage G3a/A1, GFR 45-59 and albumin creatinine ratio <30 mg/g (Chronic)    Overview     -chronic, stable  -established with nephrologist, Dr. Juanita LAGUNAS  Lab Results   Component Value Date     05/23/2023    K 3.6 05/23/2023    CL 90 (L) 05/23/2023    CO2 31 (H) 05/23/2023    BUN 27 (H) 05/23/2023    CREATININE 1.6 (H) 05/23/2023    CALCIUM 9.9 05/23/2023    ANIONGAP 16 05/23/2023    ESTGFRAFRICA 46.6 (A) 02/10/2022    EGFRNONAA 40.4 (A) 02/10/2022          Relevant Orders    CBC Auto Differential    Comprehensive Metabolic Panel       Oncology    Macrocytosis without anemia    Relevant Medications     cyanocobalamin (VITAMIN B-12) 1000 MCG tablet       Endocrine    History of non anemic vitamin B12 deficiency (Chronic)    Relevant Medications    cyanocobalamin (VITAMIN B-12) 1000 MCG tablet    Age-related osteoporosis without current pathological fracture    Overview     -DEXA 11/2020 showed osteoporosis  -hx of L hip fx in 2/2023 (nonsurgical management)  -remains on Fosamax weekly  -also on Ca/Vit.D  -due for repeat DEXA         Relevant Medications    alendronate (FOSAMAX) 70 MG tablet    cholecalciferol, vitamin D3, (VITAMIN D3) 50 mcg (2,000 unit) Cap capsule    Other Relevant Orders    Vitamin D    Prediabetes    Overview     Lab Results   Component Value Date    HGBA1C 6.1 01/08/2022   -managed with diet         Relevant Orders    Hemoglobin A1C       GI    Gastroesophageal reflux disease without esophagitis (Chronic)    Overview     -symptoms controlled with daily PPI  -denies alarm symptoms, such as dysphagia, weight loss or N/V  -continue lifestyle modification with avoidance of acidic foods, caffeine, late night eating         Relevant Medications    pantoprazole (PROTONIX) 40 MG tablet       Orthopedic    Chronic bilateral low back pain without sciatica    Overview     -followed by ortho and pain management  -remains on chronic opiates and Gabapentin          Other Visit Diagnoses       Asymptomatic age-related postmenopausal state        Relevant Orders    DXA Bone Density Axial Skeleton 1 or more sites            Follow up in about 6 months (around 7/22/2024), or if symptoms worsen or fail to improve, for with PCP.    Gloria Phan PA-C  _____________________________________________________________________________________________________________________________________________________    CC: follow up for chronic conditions     HPI: Patient is in clinic today as an established patient here for follow up for chronic conditions.    Patient had L hip fracture in 2/2023 with nonsurgical management  per ortho. She was discharged to [facility] where she completed several weeks of PT/OT. Denies falls since that time. She has continued to follow up with specialists including cardiology, ortho and pain management.     Remaining chronic conditions have been reviewed and remain stable. Further detail as stated above.     No new complaints today.     Past Medical History:   Diagnosis Date    Age-related osteoporosis without current pathological fracture 8/13/2018    Chronic atrial fibrillation 8/13/2018    Chronic diastolic heart failure 8/13/2018    Elevated brain natriuretic peptide (BNP) level 6/19/2020    Elevated uric acid in blood 5/6/2021    GIB (gastrointestinal bleeding) 5/30/2020    Macrocytosis without anemia 9/23/2020    Mixed hyperlipidemia 8/13/2018    Osteopenia of multiple sites 12/9/2019    Prediabetes 12/9/2019    RA (rheumatoid arthritis)     Secondary hyperparathyroidism 5/6/2021    Tricuspid valve regurgitation 9/19/2018    Vitamin B12 deficiency (dietary) anemia 12/9/2019    Vitamin D insufficiency 12/9/2019     Past Surgical History:   Procedure Laterality Date    TOTAL ABDOMINAL HYSTERECTOMY W/ BILATERAL SALPINGOOPHORECTOMY       Review of patient's allergies indicates:   Allergen Reactions    Meloxicam Other (See Comments)     Decreases renal function    Nitrofurantoin monohyd/m-cryst Itching     Social History     Tobacco Use    Smoking status: Never    Smokeless tobacco: Never   Substance Use Topics    Alcohol use: Not Currently    Drug use: Never     Family History   Problem Relation Age of Onset    Aneurysm Mother      Current Outpatient Medications on File Prior to Visit   Medication Sig Dispense Refill    ciprofloxacin HCl (CILOXAN) 0.3 % ophthalmic solution Place 1 drop into both eyes every 2 (two) hours. 10 mL 0    cyclobenzaprine (FLEXERIL) 10 MG tablet Take 10 mg by mouth nightly as needed.      ELIQUIS 2.5 mg Tab TAKE ONE TABLET BY MOUTH TWICE DAILY 60 tablet 1    ferrous sulfate  (FEOSOL) 325 mg (65 mg iron) Tab tablet Take 1 tablet (325 mg total) by mouth every other day. 45 tablet 1    folic acid (FOLVITE) 1 MG tablet Take 1 tablet (1,000 mcg total) by mouth once daily. 90 tablet 1    furosemide (LASIX) 20 MG tablet TAKE ONE TABLET BY MOUTH TWICE DAILY 180 tablet 3    gabapentin (NEURONTIN) 100 MG capsule Take 100 mg by mouth 2 (two) times daily.   1    HYDROcodone-acetaminophen (NORCO) 7.5-325 mg per tablet Take 1 tablet by mouth every 6 (six) hours as needed for Pain.      metOLazone (ZAROXOLYN) 5 MG tablet One tablet po daily for five days. 30 tablet 3    nystatin (MYCOSTATIN) powder Apply topically 4 (four) times daily. Apply 1 application topically 4 (four) times daily. 60 g 0    [DISCONTINUED] alendronate (FOSAMAX) 70 MG tablet TAKE ONE TABLET BY MOUTH EVERY 7 DAYS 12 tablet 1    [DISCONTINUED] busPIRone (BUSPAR) 5 MG Tab TAKE 1 TABLET BY MOUTH TWICE DAILY 60 tablet 2    [DISCONTINUED] carvediloL (COREG) 12.5 MG tablet Take 1 tablet (12.5 mg total) by mouth 2 (two) times daily with meals. Due for annual with PCP, Labs 180 tablet 0    [DISCONTINUED] cholecalciferol, vitamin D3, (VITAMIN D3) 50 mcg (2,000 unit) Cap capsule Take 1 capsule (2,000 Units total) by mouth once daily. 90 capsule 1    [DISCONTINUED] citalopram (CELEXA) 10 MG tablet Take 1 tablet (10 mg total) by mouth every evening. Due for annual with PCP, Labs 90 tablet 0    [DISCONTINUED] cyanocobalamin (VITAMIN B-12) 1000 MCG tablet Take 1 tablet (1,000 mcg total) by mouth once daily. 90 tablet 1    [DISCONTINUED] fluticasone-salmeterol diskus inhaler 100-50 mcg Inhale 1 puff into the lungs 2 (two) times daily. Controller      [DISCONTINUED] NIFEdipine (PROCARDIA-XL) 30 MG (OSM) 24 hr tablet Take 1 tablet (30 mg total) by mouth once daily. 30 tablet 3    [DISCONTINUED] pantoprazole (PROTONIX) 40 MG tablet Take 1 tablet (40 mg total) by mouth once daily. Due for annual with PCP, Labs 90 tablet 0    [DISCONTINUED]  "pravastatin (PRAVACHOL) 40 MG tablet TAKE ONE TABLET BY MOUTH EVERY NIGHT 90 tablet 0     No current facility-administered medications on file prior to visit.       Review of Systems   Constitutional:  Negative for chills, diaphoresis, fatigue and fever.   HENT:  Negative for congestion, ear pain, postnasal drip, sinus pain and sore throat.    Eyes:  Negative for pain and redness.   Respiratory:  Negative for cough, chest tightness and shortness of breath.    Cardiovascular:  Negative for chest pain and leg swelling.   Gastrointestinal:  Negative for abdominal pain, constipation, diarrhea, nausea and vomiting.   Genitourinary:  Negative for dysuria and hematuria.   Musculoskeletal:  Negative for arthralgias and joint swelling.   Skin:  Negative for rash.   Neurological:  Negative for dizziness, syncope and headaches.   Psychiatric/Behavioral:  Negative for dysphoric mood. The patient is not nervous/anxious.        Vitals:    01/22/24 1328   BP: 138/85   BP Location: Right arm   Patient Position: Sitting   Pulse: 72   Resp: 17   Weight: 62 kg (136 lb 11.2 oz)   Height: 4' 11" (1.499 m)       Wt Readings from Last 3 Encounters:   01/22/24 62 kg (136 lb 11.2 oz)   06/13/23 62.6 kg (138 lb)   06/13/23 66.2 kg (145 lb 14.4 oz)       Physical Exam  Constitutional:       General: She is not in acute distress.     Appearance: Normal appearance. She is well-developed.   HENT:      Head: Normocephalic and atraumatic.   Eyes:      Conjunctiva/sclera: Conjunctivae normal.   Cardiovascular:      Rate and Rhythm: Normal rate and regular rhythm.      Pulses: Normal pulses.      Heart sounds: Normal heart sounds. No murmur heard.  Pulmonary:      Effort: Pulmonary effort is normal. No respiratory distress.      Breath sounds: Normal breath sounds.   Abdominal:      General: Bowel sounds are normal. There is no distension.      Palpations: Abdomen is soft.      Tenderness: There is no abdominal tenderness.   Musculoskeletal:         " General: Normal range of motion.      Cervical back: Normal range of motion and neck supple.   Skin:     General: Skin is warm and dry.      Findings: No rash.   Neurological:      General: No focal deficit present.      Mental Status: She is alert and oriented to person, place, and time.   Psychiatric:         Mood and Affect: Mood normal.         Behavior: Behavior normal.         Health Maintenance   Topic Date Due    TETANUS VACCINE  Never done    Shingles Vaccine (1 of 2) Never done    DEXA Scan  11/02/2022    Lipid Panel  02/10/2023

## 2024-01-22 NOTE — PATIENT INSTRUCTIONS
Louis Pineda,     If you are due for any health screening(s) below please notify me so we can arrange them to be ordered and scheduled. Most healthy patients at your age complete them, but you are free to accept or refuse.     If you can't do it, I'll definitely understand. If you can, I'd certainly appreciate it!    All of your core healthy metrics are met.

## 2024-02-12 ENCOUNTER — PATIENT OUTREACH (OUTPATIENT)
Dept: ADMINISTRATIVE | Facility: HOSPITAL | Age: 89
End: 2024-02-12
Payer: MEDICARE

## 2024-02-12 NOTE — PROGRESS NOTES
Not seen in 12 months: tried calling patient, no answer. Left voicemail. Patient did see SELENA Arzate in Jan, 2024.

## 2024-12-05 DIAGNOSIS — I48.20 CHRONIC ATRIAL FIBRILLATION: ICD-10-CM

## 2024-12-05 NOTE — TELEPHONE ENCOUNTER
Refill Routing Note   Medication(s) are not appropriate for processing by [facility] for the following reason(s):        Outside of protocol    [facility] action(s):  Route               Appointments  past 12m or future 3m with PCP    Date Provider   Last Visit   9/30/2022 Zoey Jackson MD   Next Visit   Visit date not found Zoey Jackson MD   ED visits in past 90 days: 0        Note composed:4:51 PM 12/05/2024

## 2024-12-06 RX ORDER — APIXABAN 2.5 MG/1
2.5 TABLET, FILM COATED ORAL 2 TIMES DAILY
Qty: 60 TABLET | Refills: 0 | Status: SHIPPED | OUTPATIENT
Start: 2024-12-06

## 2024-12-09 ENCOUNTER — PATIENT MESSAGE (OUTPATIENT)
Dept: ADMINISTRATIVE | Facility: CLINIC | Age: 89
End: 2024-12-09
Payer: MEDICARE

## 2025-01-13 DIAGNOSIS — D75.89 MACROCYTOSIS WITHOUT ANEMIA: ICD-10-CM

## 2025-01-13 DIAGNOSIS — R60.0 BILATERAL LOWER EXTREMITY EDEMA: ICD-10-CM

## 2025-01-13 DIAGNOSIS — E78.2 MIXED HYPERLIPIDEMIA: Chronic | ICD-10-CM

## 2025-01-13 DIAGNOSIS — K21.9 GASTROESOPHAGEAL REFLUX DISEASE WITHOUT ESOPHAGITIS: Chronic | ICD-10-CM

## 2025-01-13 DIAGNOSIS — M81.0 AGE-RELATED OSTEOPOROSIS WITHOUT CURRENT PATHOLOGICAL FRACTURE: ICD-10-CM

## 2025-01-13 DIAGNOSIS — F41.9 ANXIETY: Chronic | ICD-10-CM

## 2025-01-13 DIAGNOSIS — H10.13 ALLERGIC CONJUNCTIVITIS OF BOTH EYES: ICD-10-CM

## 2025-01-13 DIAGNOSIS — N18.31 CKD STAGE G3A/A1, GFR 45-59 AND ALBUMIN CREATININE RATIO <30 MG/G: ICD-10-CM

## 2025-01-13 DIAGNOSIS — I10 PRIMARY HYPERTENSION: ICD-10-CM

## 2025-01-13 DIAGNOSIS — B37.2 CANDIDAL INTERTRIGO: ICD-10-CM

## 2025-01-13 DIAGNOSIS — Z86.39 HISTORY OF NON ANEMIC VITAMIN B12 DEFICIENCY: ICD-10-CM

## 2025-01-13 DIAGNOSIS — I48.20 CHRONIC ATRIAL FIBRILLATION: Chronic | ICD-10-CM

## 2025-01-13 RX ORDER — LANOLIN ALCOHOL/MO/W.PET/CERES
1000 CREAM (GRAM) TOPICAL DAILY
Qty: 90 TABLET | Refills: 3 | OUTPATIENT
Start: 2025-01-13

## 2025-01-13 RX ORDER — PRAVASTATIN SODIUM 40 MG/1
40 TABLET ORAL NIGHTLY
Qty: 90 TABLET | Refills: 3 | OUTPATIENT
Start: 2025-01-13

## 2025-01-13 RX ORDER — ACETAMINOPHEN 500 MG
2000 TABLET ORAL DAILY
Qty: 90 CAPSULE | Refills: 3 | OUTPATIENT
Start: 2025-01-13

## 2025-01-13 RX ORDER — CITALOPRAM 10 MG/1
10 TABLET ORAL NIGHTLY
Qty: 90 TABLET | Refills: 3 | OUTPATIENT
Start: 2025-01-13

## 2025-01-13 RX ORDER — PANTOPRAZOLE SODIUM 40 MG/1
40 TABLET, DELAYED RELEASE ORAL DAILY
Qty: 90 TABLET | Refills: 3 | OUTPATIENT
Start: 2025-01-13

## 2025-01-13 RX ORDER — METOLAZONE 5 MG/1
TABLET ORAL
Qty: 30 TABLET | Refills: 3 | OUTPATIENT
Start: 2025-01-13

## 2025-01-13 RX ORDER — NIFEDIPINE 30 MG/1
30 TABLET, EXTENDED RELEASE ORAL DAILY
Qty: 90 TABLET | Refills: 3 | OUTPATIENT
Start: 2025-01-13

## 2025-01-13 RX ORDER — BUSPIRONE HYDROCHLORIDE 5 MG/1
5 TABLET ORAL 2 TIMES DAILY
Qty: 180 TABLET | Refills: 3 | OUTPATIENT
Start: 2025-01-13 | End: 2026-01-08

## 2025-01-13 RX ORDER — FERROUS SULFATE 325(65) MG
325 TABLET ORAL EVERY OTHER DAY
Qty: 45 TABLET | Refills: 1 | OUTPATIENT
Start: 2025-01-13

## 2025-01-13 RX ORDER — CARVEDILOL 12.5 MG/1
12.5 TABLET ORAL 2 TIMES DAILY WITH MEALS
Qty: 180 TABLET | Refills: 3 | OUTPATIENT
Start: 2025-01-13

## 2025-01-13 RX ORDER — FOLIC ACID 1 MG/1
1000 TABLET ORAL DAILY
Qty: 90 TABLET | Refills: 1 | OUTPATIENT
Start: 2025-01-13

## 2025-01-13 RX ORDER — CYCLOBENZAPRINE HCL 10 MG
10 TABLET ORAL NIGHTLY PRN
OUTPATIENT
Start: 2025-01-13

## 2025-01-13 RX ORDER — ALENDRONATE SODIUM 70 MG/1
70 TABLET ORAL
Qty: 12 TABLET | Refills: 3 | OUTPATIENT
Start: 2025-01-13

## 2025-01-13 RX ORDER — FUROSEMIDE 20 MG/1
20 TABLET ORAL 2 TIMES DAILY
Qty: 180 TABLET | Refills: 3 | OUTPATIENT
Start: 2025-01-13

## 2025-01-13 RX ORDER — CIPROFLOXACIN HYDROCHLORIDE 3 MG/ML
1 SOLUTION/ DROPS OPHTHALMIC
Qty: 10 ML | Refills: 0 | OUTPATIENT
Start: 2025-01-13

## 2025-01-13 RX ORDER — NYSTATIN 100000 [USP'U]/G
POWDER TOPICAL 4 TIMES DAILY
Qty: 60 G | Refills: 0 | OUTPATIENT
Start: 2025-01-13

## 2025-01-13 NOTE — TELEPHONE ENCOUNTER
Pt's grand daughter ag called stating pt is needing all medications refilled at this time. Advised pt is due for visit. Appt scheduled with you for 02/2025. Please advise if enough is able to be sent in until visit. Scripts pended and routed.

## 2025-01-13 NOTE — TELEPHONE ENCOUNTER
Pt overdue for appt with me. Please help pt sched appt with me. I will send enough medication to get to appt.  Thanks,

## 2025-01-13 NOTE — TELEPHONE ENCOUNTER
No care due was identified.  [facility] Embedded Care Due Messages. Reference number: [id number].   1/13/2025 3:22:08 PM CST

## 2025-01-17 ENCOUNTER — TELEPHONE (OUTPATIENT)
Dept: FAMILY MEDICINE | Facility: CLINIC | Age: OVER 89
End: 2025-01-17
Payer: MEDICARE

## 2025-01-17 DIAGNOSIS — K21.9 GASTROESOPHAGEAL REFLUX DISEASE WITHOUT ESOPHAGITIS: Chronic | ICD-10-CM

## 2025-01-17 DIAGNOSIS — M81.0 AGE-RELATED OSTEOPOROSIS WITHOUT CURRENT PATHOLOGICAL FRACTURE: ICD-10-CM

## 2025-01-17 DIAGNOSIS — F41.9 ANXIETY: Chronic | ICD-10-CM

## 2025-01-17 DIAGNOSIS — E78.2 MIXED HYPERLIPIDEMIA: Chronic | ICD-10-CM

## 2025-01-17 DIAGNOSIS — R60.0 BILATERAL LOWER EXTREMITY EDEMA: ICD-10-CM

## 2025-01-17 DIAGNOSIS — I10 PRIMARY HYPERTENSION: ICD-10-CM

## 2025-01-17 DIAGNOSIS — I48.20 CHRONIC ATRIAL FIBRILLATION: ICD-10-CM

## 2025-01-17 RX ORDER — BUSPIRONE HYDROCHLORIDE 5 MG/1
5 TABLET ORAL 2 TIMES DAILY
Qty: 180 TABLET | Refills: 3 | Status: SHIPPED | OUTPATIENT
Start: 2025-01-17 | End: 2026-01-12

## 2025-01-17 RX ORDER — FUROSEMIDE 20 MG/1
20 TABLET ORAL 2 TIMES DAILY
Qty: 180 TABLET | Refills: 3 | Status: SHIPPED | OUTPATIENT
Start: 2025-01-17

## 2025-01-17 RX ORDER — NIFEDIPINE 30 MG/1
30 TABLET, EXTENDED RELEASE ORAL DAILY
Qty: 90 TABLET | Refills: 3 | Status: SHIPPED | OUTPATIENT
Start: 2025-01-17

## 2025-01-17 RX ORDER — CITALOPRAM 10 MG/1
10 TABLET ORAL NIGHTLY
Qty: 90 TABLET | Refills: 3 | Status: SHIPPED | OUTPATIENT
Start: 2025-01-17

## 2025-01-17 RX ORDER — PANTOPRAZOLE SODIUM 40 MG/1
40 TABLET, DELAYED RELEASE ORAL DAILY
Qty: 90 TABLET | Refills: 3 | Status: SHIPPED | OUTPATIENT
Start: 2025-01-17

## 2025-01-17 RX ORDER — PRAVASTATIN SODIUM 40 MG/1
40 TABLET ORAL NIGHTLY
Qty: 90 TABLET | Refills: 3 | Status: SHIPPED | OUTPATIENT
Start: 2025-01-17

## 2025-01-17 RX ORDER — ALENDRONATE SODIUM 70 MG/1
70 TABLET ORAL
Qty: 12 TABLET | Refills: 3 | Status: SHIPPED | OUTPATIENT
Start: 2025-01-17

## 2025-01-17 NOTE — TELEPHONE ENCOUNTER
Attempted to call and leave VM that medications have been sent. Unable to leave message at this time.

## 2025-01-17 NOTE — TELEPHONE ENCOUNTER
No orders of the defined types were placed in this encounter.      Medications Ordered This Encounter   Medications    alendronate (FOSAMAX) 70 MG tablet     Sig: Take 1 tablet (70 mg total) by mouth every 7 days.     Dispense:  12 tablet     Refill:  3    apixaban (ELIQUIS) 2.5 mg Tab     Sig: Take 1 tablet (2.5 mg total) by mouth 2 (two) times daily.     Dispense:  180 tablet     Refill:  3    busPIRone (BUSPAR) 5 MG Tab     Sig: Take 1 tablet (5 mg total) by mouth 2 (two) times daily.     Dispense:  180 tablet     Refill:  3    citalopram (CELEXA) 10 MG tablet     Sig: Take 1 tablet (10 mg total) by mouth every evening.     Dispense:  90 tablet     Refill:  3    furosemide (LASIX) 20 MG tablet     Sig: Take 1 tablet (20 mg total) by mouth 2 (two) times daily.     Dispense:  180 tablet     Refill:  3    NIFEdipine (PROCARDIA-XL) 30 MG (OSM) 24 hr tablet     Sig: Take 1 tablet (30 mg total) by mouth once daily.     Dispense:  90 tablet     Refill:  3     .    pantoprazole (PROTONIX) 40 MG tablet     Sig: Take 1 tablet (40 mg total) by mouth once daily. Due for annual with PCP, Labs     Dispense:  90 tablet     Refill:  3    pravastatin (PRAVACHOL) 40 MG tablet     Sig: Take 1 tablet (40 mg total) by mouth every evening.     Dispense:  90 tablet     Refill:  3

## 2025-01-17 NOTE — TELEPHONE ENCOUNTER
----- Message from Shreya sent at 1/17/2025  8:28 AM CST -----  Contact: Granddaughter, Amaya, [phone]  Calling to inquire about the refill request. Please call her,. Thanks.

## 2025-01-30 DIAGNOSIS — Z00.00 ENCOUNTER FOR MEDICARE ANNUAL WELLNESS EXAM: ICD-10-CM
